# Patient Record
Sex: MALE | Race: WHITE | NOT HISPANIC OR LATINO | Employment: FULL TIME | URBAN - METROPOLITAN AREA
[De-identification: names, ages, dates, MRNs, and addresses within clinical notes are randomized per-mention and may not be internally consistent; named-entity substitution may affect disease eponyms.]

---

## 2019-10-02 ENCOUNTER — OFFICE VISIT (OUTPATIENT)
Dept: FAMILY MEDICINE | Facility: CLINIC | Age: 59
End: 2019-10-02
Attending: FAMILY MEDICINE
Payer: COMMERCIAL

## 2019-10-02 VITALS
HEART RATE: 65 BPM | SYSTOLIC BLOOD PRESSURE: 100 MMHG | HEIGHT: 70 IN | WEIGHT: 209 LBS | DIASTOLIC BLOOD PRESSURE: 70 MMHG | BODY MASS INDEX: 29.92 KG/M2 | OXYGEN SATURATION: 95 %

## 2019-10-02 DIAGNOSIS — N20.0 KIDNEY STONES: ICD-10-CM

## 2019-10-02 DIAGNOSIS — Z00.00 LABORATORY EXAM ORDERED AS PART OF ROUTINE GENERAL MEDICAL EXAMINATION: ICD-10-CM

## 2019-10-02 DIAGNOSIS — Z11.59 NEED FOR HEPATITIS C SCREENING TEST: ICD-10-CM

## 2019-10-02 DIAGNOSIS — K57.30 DIVERTICULOSIS OF LARGE INTESTINE WITHOUT HEMORRHAGE: ICD-10-CM

## 2019-10-02 DIAGNOSIS — E78.00 HYPERCHOLESTEROLEMIA: ICD-10-CM

## 2019-10-02 DIAGNOSIS — Z80.42 FAMILY HISTORY OF PROSTATE CANCER IN FATHER: ICD-10-CM

## 2019-10-02 DIAGNOSIS — Z00.00 ROUTINE GENERAL MEDICAL EXAMINATION AT A HEALTH CARE FACILITY: Primary | ICD-10-CM

## 2019-10-02 PROCEDURE — 99386 PR PREVENTIVE VISIT,NEW,40-64: ICD-10-PCS | Mod: S$GLB,,, | Performed by: FAMILY MEDICINE

## 2019-10-02 PROCEDURE — 99386 PREV VISIT NEW AGE 40-64: CPT | Mod: S$GLB,,, | Performed by: FAMILY MEDICINE

## 2019-10-02 PROCEDURE — 99999 PR PBB SHADOW E&M-NEW PATIENT-LVL III: CPT | Mod: PBBFAC,,, | Performed by: FAMILY MEDICINE

## 2019-10-02 PROCEDURE — 99999 PR PBB SHADOW E&M-NEW PATIENT-LVL III: ICD-10-PCS | Mod: PBBFAC,,, | Performed by: FAMILY MEDICINE

## 2019-10-02 NOTE — PROGRESS NOTES
Subjective:       Patient ID: Guillermo Gee is a 58 y.o. male.    Chief Complaint: Establish Care    HPI    The patient presents today for first visit with me. He is requesting a routine periodic health examination.    Patient Active Problem List   Diagnosis    Diverticulosis    Family history of prostate cancer in father    Kidney stones    Hypercholesterolemia       Past Surgical History:   Procedure Laterality Date    INGUINAL HERNIA REPAIR Left     SHOULDER ARTHROSCOPY W/ ROTATOR CUFF REPAIR Bilateral 2011, 2012    UMBILICAL HERNIA REPAIR           Current Outpatient Medications:     varicella-zoster gE-AS01B, PF, (SHINGRIX, PF,) 50 mcg/0.5 mL injection, Inject 0.5 mLs into the muscle once. for 1 dose, Disp: 0.5 mL, Rfl: 0    Review of patient's allergies indicates:   Allergen Reactions    Codeine Itching       Family History   Problem Relation Age of Onset    No Known Problems Mother     Prostate cancer Father     No Known Problems Sister     No Known Problems Brother     Colon cancer Paternal Uncle     No Known Problems Brother        Social History     Socioeconomic History    Marital status:      Spouse name: Not on file    Number of children: Not on file    Years of education: Not on file    Highest education level: Not on file   Occupational History    Occupation:      Comment: [employer]   Social Needs    Financial resource strain: Not hard at all    Food insecurity:     Worry: Never true     Inability: Never true    Transportation needs:     Medical: No     Non-medical: No   Tobacco Use    Smoking status: Current Some Day Smoker     Types: Cigarettes     Start date: 12/27/1981    Smokeless tobacco: Never Used    Tobacco comment: occ cig use: ~ 3/day   Substance and Sexual Activity    Alcohol use: Yes     Alcohol/week: 6.0 - 8.0 standard drinks     Types: 3 - 4 Glasses of wine, 3 - 4 Standard drinks or equivalent per week     Frequency: 2-3 times a week      Drinks per session: 1 or 2     Binge frequency: Less than monthly    Drug use: Never    Sexual activity: Yes     Partners: Female   Lifestyle    Physical activity:     Days per week: 0 days     Minutes per session: Not on file    Stress: Rather much   Relationships    Social connections:     Talks on phone: More than three times a week     Gets together: Once a week     Attends [employer] service: More than 4 times per year     Active member of club or organization: Yes     Attends meetings of clubs or organizations: More than 4 times per year     Relationship status:    Other Topics Concern    Not on file   Social History Narrative    He is not satisfied with weight.    Rates diet as poor.    He does not drink at least 1/2 gallon water daily.    He drinks 3-4 coffee/tea/caffeine-containing soft drinks daily.    Total sleep time at night is 6 hours.    He works 50-60 hours per week.    He does wear seat belts.    Hobbies include singing, fishing.       Patient Care Team:  Polo Osman Jr., MD as PCP - General (Family Medicine)  Francis Kaur MD as Consulting Physician (Gastroenterology)  Shine Garcia MD as Consulting Physician (Orthopedic Surgery)    Review of Systems   Constitutional: Negative for fatigue and unexpected weight change.   HENT: Negative for ear discharge, ear pain, hearing loss, tinnitus and voice change.    Eyes: Negative for pain.   Respiratory: Negative for cough and shortness of breath.    Cardiovascular: Negative for chest pain, palpitations and leg swelling.   Gastrointestinal: Negative for abdominal pain, blood in stool, constipation, diarrhea, nausea and vomiting.   Genitourinary: Negative for decreased urine volume, difficulty urinating, dysuria, enuresis, frequency, hematuria and urgency.   Musculoskeletal: Negative for arthralgias, back pain and myalgias.   Skin: Negative for rash.   Neurological: Positive for headaches (occ). Negative for dizziness, weakness and  "light-headedness.   Hematological: Does not bruise/bleed easily.   Psychiatric/Behavioral: Negative for dysphoric mood and sleep disturbance. The patient is not nervous/anxious.          Objective:      /70 (BP Location: Left arm, Patient Position: Sitting, BP Method: Large (Manual))   Pulse 65   Ht 5' 10" (1.778 m)   Wt 94.8 kg (209 lb)   SpO2 95%   BMI 29.99 kg/m²     Physical Exam   Constitutional: He is oriented to person, place, and time. He appears well-developed and well-nourished. He is cooperative.   HENT:   Head: Normocephalic and atraumatic.   Right Ear: External ear normal.   Left Ear: External ear normal.   Nose: Nose normal.   Mouth/Throat: Oropharynx is clear and moist and mucous membranes are normal. No oropharyngeal exudate.   Eyes: Conjunctivae are normal. No scleral icterus.   Neck: Neck supple. No JVD present. Carotid bruit is not present. No thyromegaly present.   Cardiovascular: Normal rate, regular rhythm, normal heart sounds and normal pulses. Exam reveals no gallop and no friction rub.   No murmur heard.  Pulmonary/Chest: Effort normal and breath sounds normal. He has no wheezes. He has no rhonchi. He has no rales.   Abdominal: Soft. Bowel sounds are normal. He exhibits no distension and no mass. There is no splenomegaly or hepatomegaly. There is no tenderness.   Musculoskeletal: Normal range of motion. He exhibits no edema or tenderness.   Lymphadenopathy:     He has no cervical adenopathy.     He has no axillary adenopathy.   Neurological: He is alert and oriented to person, place, and time. He has normal strength and normal reflexes. No cranial nerve deficit or sensory deficit. Coordination normal.   Skin: Skin is warm and dry.   Psychiatric: He has a normal mood and affect.   Vitals reviewed.        Assessment:       1. Routine general medical examination at a health care facility    2. Hypercholesterolemia    3. Diverticulosis of large intestine without hemorrhage    4. " "Family history of prostate cancer in father    5. Kidney stones    6. Need for hepatitis C screening test    7. Laboratory exam ordered as part of routine general medical examination          Plan:       Reviewed recent labs from 4/2019.  Labs (see Orders).    Discussed with patient the importance of lifestyle modifications, including well-balanced diet and moderate exercise regimen, in reducing risk for cardiovascular/cerebrovascular disease and diabetes.    Discussed routine examinations and screenings.   Declines flu vaccine and pneumococcal vaccine.   Agree to Shingrix.   Will obtain date of most recent TdaP.    Orders Placed This Encounter    Lipoprotein Analysis, by NMR    Comprehensive metabolic panel    TSH    PSA, Screening    CBC auto differential    Hepatitis C antibody    varicella-zoster gE-AS01B, PF, (SHINGRIX, PF,) 50 mcg/0.5 mL injection     We will call the patient with results & make further recommendations at that time.             "This note will not be shared with the patient."  "

## 2019-10-02 NOTE — PATIENT INSTRUCTIONS
Guillermo,     We are always striving for excellence. Should you receive a patient experience survey electronically or by mail, we would appreciate if you would take a few moments to give us your feedback. These surveys let us know our strengths as well as areas of opportunity for improvement to better serve you.    Thank you for your time,  Simi Gee LPN    Test results will be communicated to you via : [facility], Telephone or Letter.   If you have not received test results in one week, please contact the clinic at   [phone].

## 2019-10-04 ENCOUNTER — LAB VISIT (OUTPATIENT)
Dept: LAB | Facility: HOSPITAL | Age: 59
End: 2019-10-04
Attending: FAMILY MEDICINE
Payer: COMMERCIAL

## 2019-10-04 DIAGNOSIS — Z80.42 FAMILY HISTORY OF PROSTATE CANCER IN FATHER: ICD-10-CM

## 2019-10-04 DIAGNOSIS — Z11.59 NEED FOR HEPATITIS C SCREENING TEST: ICD-10-CM

## 2019-10-04 DIAGNOSIS — Z00.00 LABORATORY EXAM ORDERED AS PART OF ROUTINE GENERAL MEDICAL EXAMINATION: ICD-10-CM

## 2019-10-04 DIAGNOSIS — E78.00 HYPERCHOLESTEROLEMIA: ICD-10-CM

## 2019-10-04 LAB
ALBUMIN SERPL BCP-MCNC: 3.8 G/DL (ref 3.5–5.2)
ALP SERPL-CCNC: 49 U/L (ref 55–135)
ALT SERPL W/O P-5'-P-CCNC: 18 U/L (ref 10–44)
ANION GAP SERPL CALC-SCNC: 11 MMOL/L (ref 8–16)
AST SERPL-CCNC: 21 U/L (ref 10–40)
BASOPHILS # BLD AUTO: 0.02 K/UL (ref 0–0.2)
BASOPHILS NFR BLD: 0.3 % (ref 0–1.9)
BILIRUB SERPL-MCNC: 0.3 MG/DL (ref 0.1–1)
BUN SERPL-MCNC: 12 MG/DL (ref 6–20)
CALCIUM SERPL-MCNC: 9.1 MG/DL (ref 8.7–10.5)
CHLORIDE SERPL-SCNC: 108 MMOL/L (ref 95–110)
CO2 SERPL-SCNC: 22 MMOL/L (ref 23–29)
COMPLEXED PSA SERPL-MCNC: 0.95 NG/ML (ref 0–4)
CREAT SERPL-MCNC: 1 MG/DL (ref 0.5–1.4)
DIFFERENTIAL METHOD: NORMAL
EOSINOPHIL # BLD AUTO: 0.1 K/UL (ref 0–0.5)
EOSINOPHIL NFR BLD: 2.4 % (ref 0–8)
ERYTHROCYTE [DISTWIDTH] IN BLOOD BY AUTOMATED COUNT: 12.7 % (ref 11.5–14.5)
EST. GFR  (AFRICAN AMERICAN): >60 ML/MIN/1.73 M^2
EST. GFR  (NON AFRICAN AMERICAN): >60 ML/MIN/1.73 M^2
GLUCOSE SERPL-MCNC: 104 MG/DL (ref 70–110)
HCT VFR BLD AUTO: 46.3 % (ref 40–54)
HCV AB SERPL QL IA: NEGATIVE
HGB BLD-MCNC: 15.1 G/DL (ref 14–18)
IMM GRANULOCYTES # BLD AUTO: 0.01 K/UL (ref 0–0.04)
IMM GRANULOCYTES NFR BLD AUTO: 0.2 % (ref 0–0.5)
LYMPHOCYTES # BLD AUTO: 2.2 K/UL (ref 1–4.8)
LYMPHOCYTES NFR BLD: 38.9 % (ref 18–48)
MCH RBC QN AUTO: 29.5 PG (ref 27–31)
MCHC RBC AUTO-ENTMCNC: 32.6 G/DL (ref 32–36)
MCV RBC AUTO: 91 FL (ref 82–98)
MONOCYTES # BLD AUTO: 0.6 K/UL (ref 0.3–1)
MONOCYTES NFR BLD: 10.9 % (ref 4–15)
NEUTROPHILS # BLD AUTO: 2.7 K/UL (ref 1.8–7.7)
NEUTROPHILS NFR BLD: 47.3 % (ref 38–73)
NRBC BLD-RTO: 0 /100 WBC
PLATELET # BLD AUTO: 231 K/UL (ref 150–350)
PMV BLD AUTO: 10.6 FL (ref 9.2–12.9)
POTASSIUM SERPL-SCNC: 3.8 MMOL/L (ref 3.5–5.1)
PROT SERPL-MCNC: 6.8 G/DL (ref 6–8.4)
RBC # BLD AUTO: 5.11 M/UL (ref 4.6–6.2)
SODIUM SERPL-SCNC: 141 MMOL/L (ref 136–145)
TSH SERPL DL<=0.005 MIU/L-ACNC: 2.78 UIU/ML (ref 0.4–4)
WBC # BLD AUTO: 5.76 K/UL (ref 3.9–12.7)

## 2019-10-04 PROCEDURE — 83704 LIPOPROTEIN BLD QUAN PART: CPT

## 2019-10-04 PROCEDURE — 85025 COMPLETE CBC W/AUTO DIFF WBC: CPT

## 2019-10-04 PROCEDURE — 36415 COLL VENOUS BLD VENIPUNCTURE: CPT | Mod: PO

## 2019-10-04 PROCEDURE — 80053 COMPREHEN METABOLIC PANEL: CPT

## 2019-10-04 PROCEDURE — 84443 ASSAY THYROID STIM HORMONE: CPT

## 2019-10-04 PROCEDURE — 86803 HEPATITIS C AB TEST: CPT

## 2019-10-04 PROCEDURE — 84153 ASSAY OF PSA TOTAL: CPT

## 2019-10-07 LAB
CHOLEST SERPL-MCNC: 250 MG/DL
HDL SERPL QN: 8.5 NM
HDL SERPL-SCNC: 28.4 UMOL/L
HDLC SERPL-MCNC: 38 MG/DL (ref 40–59)
HLD.LARGE SERPL-SCNC: <2.8 UMOL/L
LDL SERPL QN: 20.4 NM
LDL SERPL-SCNC: 2345 NMOL/L
LDL SMALL SERPL-SCNC: >1085 NMOL/L
LDLC SERPL CALC-MCNC: 181 MG/DL
PATHOLOGY STUDY: ABNORMAL
TRIGL SERPL-MCNC: 153 MG/DL (ref 30–149)
VLDL LARGE SERPL-SCNC: 2.6 NMOL/L
VLDL SERPL QN: 41.2 NM

## 2019-10-08 ENCOUNTER — TELEPHONE (OUTPATIENT)
Dept: FAMILY MEDICINE | Facility: CLINIC | Age: 59
End: 2019-10-08

## 2019-10-08 DIAGNOSIS — Z91.89 FRAMINGHAM CARDIAC RISK 10-20% IN NEXT 10 YEARS: ICD-10-CM

## 2019-10-08 NOTE — TELEPHONE ENCOUNTER
Discussed lab results and Dr. Osman's recommendations with the patient. Patient states he will have to call back to schedule his follow up visit.       Patient asked that I email his lab results to his [employer] account.     Labs have been emailed.

## 2019-10-08 NOTE — TELEPHONE ENCOUNTER
----- Message from Aide Grider sent at 10/8/2019 10:35 AM CDT -----  Contact: IRWIN SPAIN [[id number]]  Name of Who is Calling: IRWIN SPAIN [[id number]]    What is the request in detail:Patient is requesting test results....Please contact to further discuss and advise      Can the clinic reply by [facility]: No   [facility]: [phone]     July 14, 2019      [address]  [address] [place] [address]        Dear ,    We are writing to inform you of your test results.    Blood sugars continue to be stable.  Celiac testing is negative.  Potassium remains slightly low and you can increase this with dietary intake.  This can be done with spinach, beans, yogurt, avocados, bananas and potatoes.  Kidney function is slightly worse than it was prior.  I would recommend increasing water intake and avoiding nonsteroidal anti-inflammatory medications including ibuprofen, Aleve, naproxen, high-dose aspirin and Advil.  Please continue with current treatment plan and work on quitting smoking.      Resulted Orders   Lipid Panel   Result Value Ref Range    Cholesterol 132 <200 mg/dL    Triglycerides 194 (H) <150 mg/dL      Comment:      Borderline high:  150-199 mg/dl  High:             200-499 mg/dl  Very high:       >499 mg/dl      HDL Cholesterol 42 23 - 92 mg/dL    LDL Cholesterol Calculated 51 <100 mg/dL      Comment:      Desirable:       <100 mg/dl    Non HDL Cholesterol 90 <130 mg/dL   Hemoglobin A1c   Result Value Ref Range    Hemoglobin A1C 7.0 (H) 4.0 - 6.0 %   TSH   Result Value Ref Range    Thyrotropin 2.69 0.34 - 5.60 IU/mL   Albumin Random Urine Quantitative with Creat Ratio   Result Value Ref Range    Creatinine Urine 161 mg/dL    Albumin Urine mg/L 28 mg/L    Albumin Urine mg/g Cr 17.45 0 - 25 mg/g Cr   Hepatitis C antibody   Result Value Ref Range    Hepatitis C Antibody Nonreactive NR^Nonreactive      Comment:      Assay performance characteristics have not been established for newborns,   infants, and children     IgA   Result Value Ref Range     70 - 380 mg/dL   Tissue transglutaminase Ab IgA and IgG   Result Value Ref Range    Tissue Transglutaminase Antibody IgA 1 <7 U/mL      Comment:      Negative  The tTG-IgA assay has limited utility for patients with decreased levels of   IgA. Screening for celiac disease should  include IgA testing to rule out   selective IgA deficiency and to guide selection and interpretation of   serological testing. tTG-IgG testing may be positive in celiac disease   patients with IgA deficiency.      Tissue Transglutaminase Pamella IgG <1 <7 U/mL      Comment:      Negative   Comprehensive Metabolic Panel   Result Value Ref Range    Sodium 140 134 - 144 mmol/L    Potassium 3.3 (L) 3.5 - 5.1 mmol/L    Chloride 103 98 - 107 mmol/L    Carbon Dioxide 28 21 - 31 mmol/L    Anion Gap 9 3 - 14 mmol/L    Glucose 101 70 - 105 mg/dL    Urea Nitrogen 21 7 - 25 mg/dL    Creatinine 1.15 0.60 - 1.20 mg/dL    GFR Estimate 47 (L) >60 mL/min/[1.73_m2]    GFR Estimate If Black 57 (L) >60 mL/min/[1.73_m2]    Calcium 9.6 8.6 - 10.3 mg/dL    Bilirubin Total 0.3 0.3 - 1.0 mg/dL    Albumin 4.0 3.5 - 5.7 g/dL    Protein Total 7.0 6.4 - 8.9 g/dL    Alkaline Phosphatase 47 34 - 104 U/L    ALT 13 7 - 52 U/L    AST 14 13 - 39 U/L       If you have any questions or concerns, please call the clinic at the number listed above.       Sincerely,        Ana Perdomo, DO

## 2019-10-08 NOTE — TELEPHONE ENCOUNTER
Please inform patient of the following:  Diabetes, prostate, hepatitis C and thyroid screenings are negative.  Liver and kidney function are normal. Blood count reveals no anemia.  Advanced lipoprotein analysis reveals very high number of LDL (bad) cholesterol particles, low HDL (good) cholesterol, and unfavorable LDL particle size.    Presently at moderate risk for heart disease, with chance of heart attack in next 10 years approximately 1 in 8.  Recommend patient return soon to discuss treatment options, including lifestyle changes and medication, as well as possible further evaluation.

## 2019-10-19 ENCOUNTER — PATIENT MESSAGE (OUTPATIENT)
Dept: FAMILY MEDICINE | Facility: CLINIC | Age: 59
End: 2019-10-19

## 2019-11-03 NOTE — PROGRESS NOTES
Subjective:       Patient ID: Guillermo Gee is a 58 y.o. male.    Chief Complaint: Hyperlipidemia (discuss test results)    HPI    Patient Active Problem List   Diagnosis    Diverticulosis    Family history of prostate cancer in father    Kidney stones    Hypercholesterolemia    [place] cardiac risk 10-20% in next 10 years     No current outpatient medications on file.    The following portions of the patient's history were reviewed and updated as appropriate: allergies, past family history, past medical history, past social history and past surgical history.    Review of Systems    Other than history of present illness, noncontributory.      Component Date Value Ref Range    Lipids, HDL Cholesterol 10/04/2019 38* 40 - 59 mg/dL    Lipids, Triglycerides 10/04/2019 153* 30 - 149 mg/dL    Lipids, Total Cholesterol 10/04/2019 250* <=199 mg/dL    Lipids, LDL Cholesterol 10/04/2019 181* <=129 mg/dL    LDL Particle Number by NMR 10/04/2019 2345* <=1135 nmol/L    HDL Particle Number 10/04/2019 28.4* >=33.0 umol/L    Small LDL Particle Number 10/04/2019 >1085* <=634 nmol/L    Large HDL Particle Number 10/04/2019 <2.8* >=4.2 umol/L    Large VLDL Particle Number 10/04/2019 2.6  <=2.7 nmol/L    VLDL Size 10/04/2019 41.2  <=46.7 nm    LDL Particle Size 10/04/2019 20.4* >=20.7 nm    HDL Size 10/04/2019 8.5* >=8.9 nm    EER LipoProfile by NMR 10/04/2019 See Note      Sodium 10/04/2019 141  136 - 145 mmol/L    Potassium 10/04/2019 3.8  3.5 - 5.1 mmol/L    Chloride 10/04/2019 108  95 - 110 mmol/L    CO2 10/04/2019 22* 23 - 29 mmol/L    Glucose 10/04/2019 104  70 - 110 mg/dL    BUN, Bld 10/04/2019 12  6 - 20 mg/dL    Creatinine 10/04/2019 1.0  0.5 - 1.4 mg/dL    Calcium 10/04/2019 9.1  8.7 - 10.5 mg/dL    Total Protein 10/04/2019 6.8  6.0 - 8.4 g/dL    Albumin 10/04/2019 3.8  3.5 - 5.2 g/dL    Total Bilirubin 10/04/2019 0.3  0.1 - 1.0 mg/dL    Alkaline Phosphatase 10/04/2019 49* 55 - 135 U/L    AST  "10/04/2019 21  10 - 40 U/L    ALT 10/04/2019 18  10 - 44 U/L    Anion Gap 10/04/2019 11  8 - 16 mmol/L    eGFR if African American 10/04/2019 >60.0  >60 mL/min/1.73 m^2    eGFR if non African American 10/04/2019 >60.0  >60 mL/min/1.73 m^2    TSH 10/04/2019 2.777  0.400 - 4.000 uIU/mL    PSA, SCREEN 10/04/2019 0.95  0.00 - 4.00 ng/mL    WBC 10/04/2019 5.76  3.90 - 12.70 K/uL    RBC 10/04/2019 5.11  4.60 - 6.20 M/uL    Hemoglobin 10/04/2019 15.1  14.0 - 18.0 g/dL    Hematocrit 10/04/2019 46.3  40.0 - 54.0 %    Mean Corpuscular Volume 10/04/2019 91  82 - 98 fL    Mean Corpuscular Hemoglobin 10/04/2019 29.5  27.0 - 31.0 pg    Mean Corpuscular Hgb Conc 10/04/2019 32.6  32.0 - 36.0 g/dL    RDW 10/04/2019 12.7  11.5 - 14.5 %    Platelets 10/04/2019 231  150 - 350 K/uL    MPV 10/04/2019 10.6  9.2 - 12.9 fL    Immature Granulocytes 10/04/2019 0.2  0.0 - 0.5 %    Gran # (ANC) 10/04/2019 2.7  1.8 - 7.7 K/uL    Immature Grans (Abs) 10/04/2019 0.01  0.00 - 0.04 K/uL    Lymph # 10/04/2019 2.2  1.0 - 4.8 K/uL    Mono # 10/04/2019 0.6  0.3 - 1.0 K/uL    Eos # 10/04/2019 0.1  0.0 - 0.5 K/uL    Baso # 10/04/2019 0.02  0.00 - 0.20 K/uL    nRBC 10/04/2019 0  0 /100 WBC    Gran% 10/04/2019 47.3  38.0 - 73.0 %    Lymph% 10/04/2019 38.9  18.0 - 48.0 %    Mono% 10/04/2019 10.9  4.0 - 15.0 %    Eosinophil% 10/04/2019 2.4  0.0 - 8.0 %    Basophil% 10/04/2019 0.3  0.0 - 1.9 %    Differential Method 10/04/2019 Automated     Hepatitis C Ab 10/04/2019 Negative  Negative       Objective:      /73 (BP Location: Left arm, Patient Position: Sitting, BP Method: Large (Automatic))   Pulse 61   Resp 16   Ht 5' 10" (1.778 m)   Wt 95.1 kg (209 lb 11.2 oz)   BMI 30.09 kg/m²     Physical Exam    The entirety of today's visit was devoted to counseling.  The visit lasted 20 minutes.      Assessment:       1. Hypercholesterolemia    2. [place] cardiac risk 10-20% in next 10 years        Plan:       Discuss " "patient's results, and his ASCVD risk.    I recommended:  1.  ASA 81mg daily.  2.  A moderate exercise regimen (such as a walking program).  3.  A low carb/high-protein diet (such as the Whole Life Challenge).    4.  A cholesterol lowering medication (such as atorvastatin).  5.  A coronary calcium score.    He agrees to all except the statin.  Repeat lipid profile in 3 months.        "This note will not be shared with the patient."  "

## 2019-11-04 ENCOUNTER — OFFICE VISIT (OUTPATIENT)
Dept: FAMILY MEDICINE | Facility: CLINIC | Age: 59
End: 2019-11-04
Attending: FAMILY MEDICINE
Payer: COMMERCIAL

## 2019-11-04 VITALS
SYSTOLIC BLOOD PRESSURE: 113 MMHG | HEART RATE: 61 BPM | BODY MASS INDEX: 30.02 KG/M2 | DIASTOLIC BLOOD PRESSURE: 73 MMHG | HEIGHT: 70 IN | WEIGHT: 209.69 LBS | RESPIRATION RATE: 16 BRPM

## 2019-11-04 DIAGNOSIS — Z91.89 AT RISK FOR CORONARY ARTERY DISEASE: ICD-10-CM

## 2019-11-04 DIAGNOSIS — E78.00 HYPERCHOLESTEROLEMIA: Primary | ICD-10-CM

## 2019-11-04 DIAGNOSIS — Z91.89 FRAMINGHAM CARDIAC RISK 10-20% IN NEXT 10 YEARS: ICD-10-CM

## 2019-11-04 PROCEDURE — 99999 PR PBB SHADOW E&M-EST. PATIENT-LVL III: CPT | Mod: PBBFAC,,, | Performed by: FAMILY MEDICINE

## 2019-11-04 PROCEDURE — 99213 PR OFFICE/OUTPT VISIT, EST, LEVL III, 20-29 MIN: ICD-10-PCS | Mod: S$GLB,,, | Performed by: FAMILY MEDICINE

## 2019-11-04 PROCEDURE — 3008F BODY MASS INDEX DOCD: CPT | Mod: CPTII,S$GLB,, | Performed by: FAMILY MEDICINE

## 2019-11-04 PROCEDURE — 3008F PR BODY MASS INDEX (BMI) DOCUMENTED: ICD-10-PCS | Mod: CPTII,S$GLB,, | Performed by: FAMILY MEDICINE

## 2019-11-04 PROCEDURE — 99999 PR PBB SHADOW E&M-EST. PATIENT-LVL III: ICD-10-PCS | Mod: PBBFAC,,, | Performed by: FAMILY MEDICINE

## 2019-11-04 PROCEDURE — 99213 OFFICE O/P EST LOW 20 MIN: CPT | Mod: S$GLB,,, | Performed by: FAMILY MEDICINE

## 2019-11-04 RX ORDER — ASPIRIN 81 MG/1
81 TABLET ORAL DAILY
Refills: 0 | Status: ON HOLD | COMMUNITY
Start: 2019-11-04 | End: 2020-01-10 | Stop reason: HOSPADM

## 2019-11-04 NOTE — PATIENT INSTRUCTIONS
Guillermo,     We are always striving for excellence. Should you receive a patient experience survey electronically or by mail, we would appreciate if you would take a few moments to give us your feedback. These surveys let us know our strengths as well as areas of opportunity for improvement to better serve you.    Thank you for your time,  Dayana Mock LPN    Your test results will be communicated to you via : [facility], Telephone or Letter.   If you have not received your test results in one week, please contact the clinic at [phone].

## 2019-11-12 ENCOUNTER — PATIENT MESSAGE (OUTPATIENT)
Dept: FAMILY MEDICINE | Facility: CLINIC | Age: 59
End: 2019-11-12

## 2019-11-14 ENCOUNTER — PATIENT MESSAGE (OUTPATIENT)
Dept: FAMILY MEDICINE | Facility: CLINIC | Age: 59
End: 2019-11-14

## 2019-11-21 ENCOUNTER — PATIENT MESSAGE (OUTPATIENT)
Dept: FAMILY MEDICINE | Facility: CLINIC | Age: 59
End: 2019-11-21

## 2019-11-21 ENCOUNTER — HOSPITAL ENCOUNTER (OUTPATIENT)
Dept: RADIOLOGY | Facility: HOSPITAL | Age: 59
Discharge: HOME | End: 2019-11-21
Attending: FAMILY MEDICINE
Payer: COMMERCIAL

## 2019-11-21 DIAGNOSIS — Z91.89 AT RISK FOR CORONARY ARTERY DISEASE: ICD-10-CM

## 2019-11-21 PROCEDURE — 75571 CT HRT W/O DYE W/CA TEST: CPT | Mod: 26,,, | Performed by: RADIOLOGY

## 2019-11-21 PROCEDURE — 75571 CT CALCIUM SCORING CARDIAC: ICD-10-PCS | Mod: 26,,, | Performed by: RADIOLOGY

## 2019-11-21 PROCEDURE — 75571 CT HRT W/O DYE W/CA TEST: CPT | Mod: TC

## 2019-12-03 ENCOUNTER — PATIENT MESSAGE (OUTPATIENT)
Dept: FAMILY MEDICINE | Facility: CLINIC | Age: 59
End: 2019-12-03

## 2019-12-03 DIAGNOSIS — I71.20 THORACIC AORTIC ANEURYSM WITHOUT RUPTURE: ICD-10-CM

## 2019-12-03 DIAGNOSIS — R89.4 SEROLOGIC ABNORMALITY: Primary | ICD-10-CM

## 2019-12-04 ENCOUNTER — PATIENT MESSAGE (OUTPATIENT)
Dept: FAMILY MEDICINE | Facility: CLINIC | Age: 59
End: 2019-12-04

## 2019-12-04 DIAGNOSIS — I71.20 THORACIC AORTIC ANEURYSM WITHOUT RUPTURE: Primary | ICD-10-CM

## 2019-12-05 ENCOUNTER — OFFICE VISIT (OUTPATIENT)
Dept: CARDIOTHORACIC SURGERY | Facility: CLINIC | Age: 59
End: 2019-12-05
Payer: COMMERCIAL

## 2019-12-05 ENCOUNTER — PATIENT OUTREACH (OUTPATIENT)
Dept: ADMINISTRATIVE | Facility: OTHER | Age: 59
End: 2019-12-05

## 2019-12-05 ENCOUNTER — HOSPITAL ENCOUNTER (OUTPATIENT)
Dept: CARDIOLOGY | Facility: CLINIC | Age: 59
Discharge: HOME | End: 2019-12-05
Attending: THORACIC SURGERY (CARDIOTHORACIC VASCULAR SURGERY)
Payer: COMMERCIAL

## 2019-12-05 VITALS
WEIGHT: 205 LBS | HEIGHT: 70 IN | BODY MASS INDEX: 29.35 KG/M2 | SYSTOLIC BLOOD PRESSURE: 124 MMHG | HEART RATE: 60 BPM | DIASTOLIC BLOOD PRESSURE: 80 MMHG

## 2019-12-05 VITALS
WEIGHT: 205.38 LBS | OXYGEN SATURATION: 98 % | SYSTOLIC BLOOD PRESSURE: 124 MMHG | HEART RATE: 71 BPM | HEIGHT: 70 IN | BODY MASS INDEX: 29.4 KG/M2 | DIASTOLIC BLOOD PRESSURE: 80 MMHG | TEMPERATURE: 98 F

## 2019-12-05 DIAGNOSIS — I71.21 ANEURYSM OF ASCENDING AORTA: Primary | ICD-10-CM

## 2019-12-05 DIAGNOSIS — I71.21 ANEURYSM OF ASCENDING AORTA: ICD-10-CM

## 2019-12-05 LAB
ASCENDING AORTA: 5.5 CM
AV INDEX (PROSTH): 0.46
AV MEAN GRADIENT: 5 MMHG
AV PEAK GRADIENT: 10 MMHG
AV VALVE AREA: 2.42 CM2
AV VELOCITY RATIO: 0.49
BSA FOR ECHO PROCEDURE: 2.14 M2
CV ECHO LV RWT: 0.31 CM
DOP CALC AO PEAK VEL: 1.61 M/S
DOP CALC AO VTI: 34.28 CM
DOP CALC LVOT AREA: 5.2 CM2
DOP CALC LVOT DIAMETER: 2.58 CM
DOP CALC LVOT PEAK VEL: 0.79 M/S
DOP CALC LVOT STROKE VOLUME: 82.87 CM3
DOP CALCLVOT PEAK VEL VTI: 15.86 CM
E WAVE DECELERATION TIME: 146.92 MSEC
E/A RATIO: 1.28
E/E' RATIO: 8.11 M/S
ECHO LV POSTERIOR WALL: 0.8 CM (ref 0.6–1.1)
FRACTIONAL SHORTENING: 29 % (ref 28–44)
INTERVENTRICULAR SEPTUM: 0.98 CM (ref 0.6–1.1)
LA MAJOR: 4.89 CM
LA MINOR: 4.88 CM
LA WIDTH: 4 CM
LEFT ATRIUM SIZE: 3.99 CM
LEFT ATRIUM VOLUME INDEX: 31.4 ML/M2
LEFT ATRIUM VOLUME: 66.27 CM3
LEFT INTERNAL DIMENSION IN SYSTOLE: 3.68 CM (ref 2.1–4)
LEFT VENTRICLE DIASTOLIC VOLUME INDEX: 61.31 ML/M2
LEFT VENTRICLE DIASTOLIC VOLUME: 129.33 ML
LEFT VENTRICLE MASS INDEX: 79 G/M2
LEFT VENTRICLE SYSTOLIC VOLUME INDEX: 27.2 ML/M2
LEFT VENTRICLE SYSTOLIC VOLUME: 57.37 ML
LEFT VENTRICULAR INTERNAL DIMENSION IN DIASTOLE: 5.2 CM (ref 3.5–6)
LEFT VENTRICULAR MASS: 166.55 G
LV LATERAL E/E' RATIO: 7.3 M/S
LV SEPTAL E/E' RATIO: 9.13 M/S
MV PEAK A VEL: 0.57 M/S
MV PEAK E VEL: 0.73 M/S
PISA TR MAX VEL: 2.23 M/S
PULM VEIN S/D RATIO: 1.42
PV PEAK D VEL: 0.43 M/S
PV PEAK S VEL: 0.61 M/S
RA MAJOR: 4.96 CM
RA PRESSURE: 3 MMHG
RA WIDTH: 3.22 CM
RIGHT VENTRICULAR END-DIASTOLIC DIMENSION: 3.33 CM
SINUS: 3.86 CM
STJ: 4.32 CM
TDI LATERAL: 0.1 M/S
TDI SEPTAL: 0.08 M/S
TDI: 0.09 M/S
TR MAX PG: 20 MMHG
TRICUSPID ANNULAR PLANE SYSTOLIC EXCURSION: 1.91 CM
TV REST PULMONARY ARTERY PRESSURE: 23 MMHG

## 2019-12-05 PROCEDURE — 99999 PR PBB SHADOW E&M-EST. PATIENT-LVL III: ICD-10-PCS | Mod: PBBFAC,,, | Performed by: THORACIC SURGERY (CARDIOTHORACIC VASCULAR SURGERY)

## 2019-12-05 PROCEDURE — 99999 PR PBB SHADOW E&M-EST. PATIENT-LVL III: CPT | Mod: PBBFAC,,, | Performed by: THORACIC SURGERY (CARDIOTHORACIC VASCULAR SURGERY)

## 2019-12-05 PROCEDURE — 93306 ECHO (CUPID ONLY): ICD-10-PCS | Mod: S$GLB,,, | Performed by: INTERNAL MEDICINE

## 2019-12-05 PROCEDURE — 99204 OFFICE O/P NEW MOD 45 MIN: CPT | Mod: S$GLB,,, | Performed by: THORACIC SURGERY (CARDIOTHORACIC VASCULAR SURGERY)

## 2019-12-05 PROCEDURE — 93306 TTE W/DOPPLER COMPLETE: CPT | Mod: S$GLB,,, | Performed by: INTERNAL MEDICINE

## 2019-12-05 PROCEDURE — 99204 PR OFFICE/OUTPT VISIT, NEW, LEVL IV, 45-59 MIN: ICD-10-PCS | Mod: S$GLB,,, | Performed by: THORACIC SURGERY (CARDIOTHORACIC VASCULAR SURGERY)

## 2019-12-05 NOTE — LETTER
December 5, 2019      Polo Osman Jr., MD  [address]  [address]  [facility] [address]           [facility] Cardi[facility] S[facility]  [address]  [facility] [address]  Phone: [phone]          Patient: Guillermo Gee   MR Number: [id number]   YOB: 1960   Date of Visit: 12/5/2019       Dear Dr. Polo Osman Jr.:    Thank you for referring Guillermo Gee to me for evaluation. Attached you will find relevant portions of my assessment and plan of care.    If you have questions, please do not hesitate to call me. I look forward to following Guillermo Gee along with you.    Sincerely,    Allen Baez MD    Enclosure  CC:  No Recipients    If you would like to receive this communication electronically, please contact [email or web address] or [phone] to request more information on [email or web address] Link access.    For providers and/or their staff who would like to refer a patient to [facility], please contact us through our one-stop-shop provider referral line, [facility], at [phone].    If you feel you have received this communication in error or would no longer like to receive these types of communications, please e-mail [email or web address]

## 2019-12-05 NOTE — PROGRESS NOTES
Subjective:      Patient ID: Guillermo eGe is a 58 y.o. male.    Chief Complaint: Consult      HPI:  Guillermo Gee is a 58 y.o. male who presents with HLD and TAA incidental finding on CT of chest for calcium scoring evaluation. Pt was referred for TAA surgical evaluation. Pt has no complaints.       Family and social history reviewed    Review of patient's allergies indicates:   Allergen Reactions    Codeine Itching     Past Medical History:   Diagnosis Date    Diverticulosis     Family history of prostate cancer in father     Kidney stones      Past Surgical History:   Procedure Laterality Date    INGUINAL HERNIA REPAIR Left     SHOULDER ARTHROSCOPY W/ ROTATOR CUFF REPAIR Bilateral 2011, 2012    UMBILICAL HERNIA REPAIR       Family History     Problem Relation (Age of Onset)    Colon cancer Paternal Uncle    No Known Problems Mother, Sister, Brother, Brother    Prostate cancer Father        Social History     Socioeconomic History    Marital status:      Spouse name: Not on file    Number of children: Not on file    Years of education: Not on file    Highest education level: Not on file   Occupational History    Occupation:      Comment: [employer]   Social [employer]    Financial resource strain: Not hard at all    Food insecurity:     Worry: Never true     Inability: Never true    Transportation needs:     Medical: No     Non-medical: No   Tobacco Use    Smoking status: Current Some Day Smoker     Types: Cigarettes     Start date: 12/27/1981    Smokeless tobacco: Never Used    Tobacco comment: occ cig use: ~ 3/day   Substance and Sexual Activity    Alcohol use: Yes     Alcohol/week: 6.0 - 8.0 standard drinks     Types: 3 - 4 Glasses of wine, 3 - 4 Standard drinks or equivalent per week     Frequency: 2-3 times a week     Drinks per session: 1 or 2     Binge frequency: Less than monthly    Drug use: Never    Sexual activity: Yes     Partners: Female   Lifestyle    Physical  activity:     Days per week: 0 days     Minutes per session: Not on file    Stress: Rather much   Relationships    Social connections:     Talks on phone: More than three times a week     Gets together: Once a week     Attends [employer] service: More than 4 times per year     Active member of club or organization: Yes     Attends meetings of clubs or organizations: More than 4 times per year     Relationship status:    Other Topics Concern    Not on file   Social History Narrative    He is not satisfied with weight.    Rates diet as poor.    He does not drink at least 1/2 gallon water daily.    He drinks 3-4 coffee/tea/caffeine-containing soft drinks daily.    Total sleep time at night is 6 hours.    He works 50-60 hours per week.    He does wear seat belts.    Hobbies include singing, fishing.       Current medications Reviewed    Review of Systems   Constitutional: Negative for activity change and fatigue.   Respiratory: Negative for shortness of breath.    Cardiovascular: Negative for chest pain, palpitations and leg swelling.   Gastrointestinal: Negative for abdominal pain, diarrhea and vomiting.   Endocrine: Negative for polydipsia, polyphagia and polyuria.   Genitourinary: Negative for dysuria.   Musculoskeletal: Negative for gait problem.   Skin: Negative for rash.   Allergic/Immunologic: Negative for immunocompromised state.   Neurological: Negative for dizziness, syncope and weakness.   Hematological: Does not bruise/bleed easily.   Psychiatric/Behavioral: Negative for behavioral problems.     Objective:   Physical Exam   Constitutional: He is oriented to person, place, and time. He appears well-developed and well-nourished.   HENT:   Head: Normocephalic and atraumatic.   Eyes: EOM are normal.   Neck: Normal range of motion.   Cardiovascular: Normal rate, regular rhythm and normal heart sounds.   Pulmonary/Chest: Effort normal and breath sounds normal.   Abdominal: Soft.   Musculoskeletal: Normal  range of motion.   Neurological: He is alert and oriented to person, place, and time.   Skin: Skin is warm, dry and intact. Capillary refill takes less than 2 seconds.   Psychiatric: He has a normal mood and affect.       Diagnostic Results:   CT chest:   There is fusiform dilatation of the ascending aorta that we measure at 5.4 cm    All diagnotics and labs reviewed.  Assessment:   1. TAA  Plan:   Pt will need ECHO and C to eval for further intervention     CTS Attending Note:    I have personally taken the history and examined this patient and agree with the KELLEY's note as stated above. Very pleasant 58-year-old gentleman who as part of a routine physical had a CT calcium score done.  This study demonstrated an ascending aortic aneurysm that is roughly 5.4-5.5 cm in diameter.  Based on the size of his aneurysm, I recommended surgical correction.  We discussed the rationale for recommending surgery over observation.  We discussed aortic complications, including rupture and dissection. We discussed the surgical approach, i.e. median sternotomy.  We discussed the need for preoperative echocardiography as well as coronary angiography.  He agreed that he would like to have this addressed.  We will make arrangements for him to be seen for a diagnostic angiogram.  He is going to get an echo today.  We did not schedule a date, but I suspect that we will address this for him shortly after the 1st of the year.

## 2019-12-06 ENCOUNTER — PATIENT MESSAGE (OUTPATIENT)
Dept: FAMILY MEDICINE | Facility: CLINIC | Age: 59
End: 2019-12-06

## 2019-12-06 ENCOUNTER — LAB VISIT (OUTPATIENT)
Dept: LAB | Facility: HOSPITAL | Age: 59
End: 2019-12-06
Attending: FAMILY MEDICINE
Payer: COMMERCIAL

## 2019-12-06 ENCOUNTER — PATIENT MESSAGE (OUTPATIENT)
Dept: CARDIOTHORACIC SURGERY | Facility: CLINIC | Age: 59
End: 2019-12-06

## 2019-12-06 DIAGNOSIS — R89.4 SEROLOGIC ABNORMALITY: ICD-10-CM

## 2019-12-06 PROCEDURE — 36415 COLL VENOUS BLD VENIPUNCTURE: CPT | Mod: PO

## 2019-12-06 PROCEDURE — 86592 SYPHILIS TEST NON-TREP QUAL: CPT

## 2019-12-07 LAB — RPR SER QL: NORMAL

## 2019-12-08 ENCOUNTER — PATIENT MESSAGE (OUTPATIENT)
Dept: FAMILY MEDICINE | Facility: CLINIC | Age: 59
End: 2019-12-08

## 2019-12-09 ENCOUNTER — PATIENT MESSAGE (OUTPATIENT)
Dept: FAMILY MEDICINE | Facility: CLINIC | Age: 59
End: 2019-12-09

## 2019-12-10 ENCOUNTER — TELEPHONE (OUTPATIENT)
Dept: PREADMISSION TESTING | Facility: HOSPITAL | Age: 59
End: 2019-12-10

## 2019-12-10 DIAGNOSIS — I71.21 ANEURYSM OF ASCENDING AORTA: Primary | ICD-10-CM

## 2019-12-10 NOTE — TELEPHONE ENCOUNTER
1/6 Appt. Enter per your request. Did not contact patient. This patient has no surgery tab this could lead to cancel appt.

## 2019-12-10 NOTE — TELEPHONE ENCOUNTER
----- Message from Josh Burnham RN sent at 12/10/2019  1:26 PM CST -----  Anesthesia Pre op Appt Request - [facility]     Ascending Aorta   1/6/2020  Mon    Requested Date:   01/02/2020      AM  or PM:  After 10 am    I will work around your appt to fill in the time with other pre-op appts.    Thanks........ Marin   [id number]

## 2019-12-11 ENCOUNTER — TELEPHONE (OUTPATIENT)
Dept: CARDIOTHORACIC SURGERY | Facility: CLINIC | Age: 59
End: 2019-12-11

## 2019-12-11 ENCOUNTER — PATIENT OUTREACH (OUTPATIENT)
Dept: ADMINISTRATIVE | Facility: OTHER | Age: 59
End: 2019-12-11

## 2019-12-11 ENCOUNTER — PATIENT MESSAGE (OUTPATIENT)
Dept: FAMILY MEDICINE | Facility: CLINIC | Age: 59
End: 2019-12-11

## 2019-12-11 ENCOUNTER — PATIENT MESSAGE (OUTPATIENT)
Dept: CARDIOTHORACIC SURGERY | Facility: CLINIC | Age: 59
End: 2019-12-11

## 2019-12-11 DIAGNOSIS — I71.20 THORACIC AORTIC ANEURYSM WITHOUT RUPTURE: Primary | ICD-10-CM

## 2019-12-11 NOTE — TELEPHONE ENCOUNTER
Spoke with pt in regards his request to reschedule his angiogram. Informed pt I sent message over to Dr. Day's staff , Mr Tito Gee stated he spoke with someone in Dr. Kyle's office already and will keep his 12/19 angiogram.

## 2019-12-12 ENCOUNTER — INITIAL CONSULT (OUTPATIENT)
Dept: CARDIOLOGY | Facility: CLINIC | Age: 59
End: 2019-12-12
Payer: COMMERCIAL

## 2019-12-12 ENCOUNTER — DOCUMENTATION ONLY (OUTPATIENT)
Dept: CARDIOLOGY | Facility: CLINIC | Age: 59
End: 2019-12-12

## 2019-12-12 ENCOUNTER — TELEPHONE (OUTPATIENT)
Dept: FAMILY MEDICINE | Facility: CLINIC | Age: 59
End: 2019-12-12

## 2019-12-12 ENCOUNTER — PATIENT MESSAGE (OUTPATIENT)
Dept: CARDIOTHORACIC SURGERY | Facility: CLINIC | Age: 59
End: 2019-12-12

## 2019-12-12 ENCOUNTER — LAB VISIT (OUTPATIENT)
Dept: LAB | Facility: HOSPITAL | Age: 59
End: 2019-12-12
Attending: INTERNAL MEDICINE
Payer: COMMERCIAL

## 2019-12-12 VITALS
DIASTOLIC BLOOD PRESSURE: 80 MMHG | OXYGEN SATURATION: 98 % | HEIGHT: 70 IN | HEART RATE: 71 BPM | BODY MASS INDEX: 29.8 KG/M2 | WEIGHT: 208.13 LBS | SYSTOLIC BLOOD PRESSURE: 110 MMHG

## 2019-12-12 DIAGNOSIS — I71.21 ANEURYSM OF ASCENDING AORTA: Primary | ICD-10-CM

## 2019-12-12 DIAGNOSIS — I71.21 ANEURYSM OF ASCENDING AORTA: ICD-10-CM

## 2019-12-12 DIAGNOSIS — E78.00 HYPERCHOLESTEROLEMIA: ICD-10-CM

## 2019-12-12 DIAGNOSIS — Q23.1 BICUSPID AORTIC VALVE: ICD-10-CM

## 2019-12-12 LAB
ANION GAP SERPL CALC-SCNC: 7 MMOL/L (ref 8–16)
BASOPHILS # BLD AUTO: 0.01 K/UL (ref 0–0.2)
BASOPHILS NFR BLD: 0.1 % (ref 0–1.9)
BUN SERPL-MCNC: 11 MG/DL (ref 6–20)
CALCIUM SERPL-MCNC: 9.5 MG/DL (ref 8.7–10.5)
CHLORIDE SERPL-SCNC: 103 MMOL/L (ref 95–110)
CO2 SERPL-SCNC: 30 MMOL/L (ref 23–29)
CREAT SERPL-MCNC: 1.1 MG/DL (ref 0.5–1.4)
DIFFERENTIAL METHOD: NORMAL
EOSINOPHIL # BLD AUTO: 0.1 K/UL (ref 0–0.5)
EOSINOPHIL NFR BLD: 1.3 % (ref 0–8)
ERYTHROCYTE [DISTWIDTH] IN BLOOD BY AUTOMATED COUNT: 13.1 % (ref 11.5–14.5)
EST. GFR  (AFRICAN AMERICAN): >60 ML/MIN/1.73 M^2
EST. GFR  (NON AFRICAN AMERICAN): >60 ML/MIN/1.73 M^2
GLUCOSE SERPL-MCNC: 104 MG/DL (ref 70–110)
HCT VFR BLD AUTO: 46.8 % (ref 40–54)
HGB BLD-MCNC: 15.2 G/DL (ref 14–18)
IMM GRANULOCYTES # BLD AUTO: 0.02 K/UL (ref 0–0.04)
IMM GRANULOCYTES NFR BLD AUTO: 0.3 % (ref 0–0.5)
LYMPHOCYTES # BLD AUTO: 2 K/UL (ref 1–4.8)
LYMPHOCYTES NFR BLD: 25.8 % (ref 18–48)
MCH RBC QN AUTO: 29.5 PG (ref 27–31)
MCHC RBC AUTO-ENTMCNC: 32.5 G/DL (ref 32–36)
MCV RBC AUTO: 91 FL (ref 82–98)
MONOCYTES # BLD AUTO: 0.9 K/UL (ref 0.3–1)
MONOCYTES NFR BLD: 11.4 % (ref 4–15)
NEUTROPHILS # BLD AUTO: 4.9 K/UL (ref 1.8–7.7)
NEUTROPHILS NFR BLD: 61.1 % (ref 38–73)
NRBC BLD-RTO: 0 /100 WBC
PLATELET # BLD AUTO: 217 K/UL (ref 150–350)
PMV BLD AUTO: 10 FL (ref 9.2–12.9)
POTASSIUM SERPL-SCNC: 4.6 MMOL/L (ref 3.5–5.1)
RBC # BLD AUTO: 5.15 M/UL (ref 4.6–6.2)
SODIUM SERPL-SCNC: 140 MMOL/L (ref 136–145)
WBC # BLD AUTO: 7.92 K/UL (ref 3.9–12.7)

## 2019-12-12 PROCEDURE — 99203 PR OFFICE/OUTPT VISIT, NEW, LEVL III, 30-44 MIN: ICD-10-PCS | Mod: S$GLB,,, | Performed by: INTERNAL MEDICINE

## 2019-12-12 PROCEDURE — 36415 COLL VENOUS BLD VENIPUNCTURE: CPT

## 2019-12-12 PROCEDURE — 80048 BASIC METABOLIC PNL TOTAL CA: CPT

## 2019-12-12 PROCEDURE — 3008F PR BODY MASS INDEX (BMI) DOCUMENTED: ICD-10-PCS | Mod: CPTII,S$GLB,, | Performed by: INTERNAL MEDICINE

## 2019-12-12 PROCEDURE — 99203 OFFICE O/P NEW LOW 30 MIN: CPT | Mod: S$GLB,,, | Performed by: INTERNAL MEDICINE

## 2019-12-12 PROCEDURE — 99999 PR PBB SHADOW E&M-EST. PATIENT-LVL III: CPT | Mod: PBBFAC,,, | Performed by: INTERNAL MEDICINE

## 2019-12-12 PROCEDURE — 85025 COMPLETE CBC W/AUTO DIFF WBC: CPT

## 2019-12-12 PROCEDURE — 3008F BODY MASS INDEX DOCD: CPT | Mod: CPTII,S$GLB,, | Performed by: INTERNAL MEDICINE

## 2019-12-12 PROCEDURE — 99999 PR PBB SHADOW E&M-EST. PATIENT-LVL III: ICD-10-PCS | Mod: PBBFAC,,, | Performed by: INTERNAL MEDICINE

## 2019-12-12 RX ORDER — AMOXICILLIN 500 MG
2 CAPSULE ORAL 2 TIMES DAILY
COMMUNITY

## 2019-12-12 RX ORDER — MULTIVIT WITH MINERALS/HERBS
1 TABLET ORAL DAILY
COMMUNITY

## 2019-12-12 NOTE — TELEPHONE ENCOUNTER
----- Message from Radha Jacinto sent at 12/12/2019 10:04 AM CST -----  Contact: Pt  Please call pt and explain to why he has to have a consult and labs today with Dr. Kyle. Pt is having stints placed next week.

## 2019-12-12 NOTE — LETTER
December 15, 2019      Allen Baez MD  [address]  [facility] [address]           [facility]  [address]  [facility] [address]  Phone: [phone]          Patient: Guillermo Gee   MR Number: [id number]   YOB: 1960   Date of Visit: 12/12/2019       Dear Dr. Allen Baez:    Thank you for referring Guillermo Gee to me for evaluation. Attached you will find relevant portions of my assessment and plan of care.    If you have questions, please do not hesitate to call me. I look forward to following Guillermo Gee along with you.    Sincerely,    John Kyle MD    Enclosure  CC:  No Recipients    If you would like to receive this communication electronically, please contact [email or web address] or [phone] to request more information on Open[employer] Link access.    For providers and/or their staff who would like to refer a patient to [facility], please contact us through our one-stop-shop provider referral line, [facility], at [phone].    If you feel you have received this communication in error or would no longer like to receive these types of communications, please e-mail [email or web address]

## 2019-12-12 NOTE — PROGRESS NOTES
OUTPATIENT CATHETERIZATION INSTRUCTIONS    You have been scheduled for a procedure in the catheterization lab on Thursday, December 19, 2019.     Please report to the Cardiology Waiting Area on the Third floor of the hospital and check in at 11 AM.   You will then be taken to the SSCU (Short Stay Cardiac Unit) and prepared for your procedure. Please be aware that this is not the time of your procedure but the time you are to arrive. The procedures are scheduled on an hourly basis; however, emergency cases take precedence over all other cases.       You may not have anything to eat or drink after midnight the night before your test. You may take your regular morning medications with water. If there are any medications that you should not take you will be instructed to hold them that morning. If you are diabetic and on Metformin (Glucophage) do not take it the day before, the day of, and for 2 days after your procedure.      The procedure will take 1-2 hours to perform. After the procedure, you will return to SSCU on the third floor of the hospital. You will need to lie still (or keep your arm still) for the next 4 to 6 hours to minimize bleeding from the puncture site. Your family may remain in the room with you during this time.       You may be able to be discharged home that same afternoon if there is someone to drive you home and there were no complications. If you have one of the balloon, stent, or device procedures you may spend the night in the hospital. Your doctor will determine, based on your progress, the date and time of your discharge. The results of your procedure will be discussed with you before you are discharged. Any further testing or procedures will be scheduled for you either before you leave or you will be called with these appointments.       If you should have any questions, concerns, or need to change the date of your procedure, please call  HARSH Abdi @ [phone]    Special  Instructions:  Drink plenty of water the day before and after your procedure.     THE ABOVE INSTRUCTIONS WERE GIVEN TO THE PATIENT VERBALLY AND THEY VERBALIZED UNDERSTANDING.  THEY DO NOT REQUIRE ANY SPECIAL NEEDS AND DO NOT HAVE ANY LEARNING BARRIERS.          Directions for Reporting to Cardiology Waiting Area in the Hospital  If you park in the Parking Garage:  Take elevators to the1st floor of the parking garage.  Continue past the gift shop, coffee shop, and piano.  Take a right and go to the gold elevators. (Elevator B)  Take the elevator to the 3rd floor.  Follow the arrow on the sign on the wall that says Cath Lab Registration/EP Lab Registration.  Follow the long hallway all the way around until you come to a big open area.  This is the registration area.  Check in at Reception Desk.    OR    If family is dropping you off:  Have them drop you off at the front of the Hospital under the green overhang.  Enter through the doors and take a right.  Take the E elevators to the 3rd floor Cardiology Waiting Area.  Check in at the Reception Desk in the waiting room.

## 2019-12-14 NOTE — PROGRESS NOTES
Cardiology Clinic Note  Reason for Visit: [facility]   Referring MD: Dr. Baez     HPI:     Guillermo Gee is a 58 y.o. M with TAA, HLD who presents for [facility] prior to TAA repair. TAA was discovered incidentally as part of prevention screening for CAD with Cardiac CT, which noted a ascending aortic aneurysm of 5.4 cm. Further TTE also noted a bileaflet aortic valve.     He reports his brothers have been diagnosed with bicuspid aortic valve incidentally when murmur was heard aspart of sports physical exam, but the patient reports that he has not had a murmur in the past. He walks up to 3 miles a day and denies any chest pain or shortness of breath with exertion. He also denies palpitations, orthopnea, leg swelling or PND symptoms.     ROS:    Constitution: Negative for fever or chills. Negative for weight loss or gain.   HENT: Negative for sore throat or headaches. Negative for rhinorrhea.  Eyes: Negative for blurred or double vision.   Cardiovascular: See above  Pulmonary: Negative for SOB. Negative for cough.   Gastrointestinal: Negative for abdominal pain. Negative for nausea/ vomiting. Negative for diarrhea.   : Negative for dysuria.   Neurological: Negative for focal weakness or sensory changes.  PMH:     Past Medical History:   Diagnosis Date    Diverticulosis     Family history of prostate cancer in father     Kidney stones      Past Surgical History:   Procedure Laterality Date    INGUINAL HERNIA REPAIR Left     SHOULDER ARTHROSCOPY W/ ROTATOR CUFF REPAIR Bilateral 2011, 2012    UMBILICAL HERNIA REPAIR       Allergies:     Review of patient's allergies indicates:   Allergen Reactions    Codeine Itching     Medications:     Current Outpatient Medications on File Prior to Visit   Medication Sig Dispense Refill    aspirin (ECOTRIN) 81 MG EC tablet Take 1 tablet (81 mg total) by mouth once daily.  0    b complex vitamins tablet Take 1 tablet by mouth once daily.      BIOTIN ORAL Take 1 tablet by mouth once  "daily.      omega-3 fatty acids/fish oil (FISH OIL-OMEGA-3 FATTY ACIDS) 300-1,000 mg capsule Take 2 capsules by mouth once daily.      biotin/calcium carbonate (BIOTIN 100+10 ORAL) Take by mouth.       No current facility-administered medications on file prior to visit.      Social History:     Social History     Tobacco Use    Smoking status: Current Some Day Smoker     Types: Cigarettes     Start date: 12/27/1981    Smokeless tobacco: Never Used    Tobacco comment: occ cig use: ~ 3/day   Substance Use Topics    Alcohol use: Yes     Alcohol/week: 6.0 - 8.0 standard drinks     Types: 3 - 4 Glasses of wine, 3 - 4 Standard drinks or equivalent per week     Frequency: 2-3 times a week     Drinks per session: 1 or 2     Binge frequency: Less than monthly     Family History:     Family History   Problem Relation Age of Onset    No Known Problems Mother     Prostate cancer Father     No Known Problems Sister     No Known Problems Brother     Colon cancer Paternal Uncle     No Known Problems Brother      Physical Exam:     Vitals:    12/12/19 1505 12/12/19 1506   BP: 112/81 110/80   BP Location: Left arm Right arm   Patient Position: Sitting Sitting   BP Method: Large (Automatic) Large (Automatic)   Pulse: 69 71   SpO2:  98%   Weight:  94.4 kg (208 lb 1.8 oz)   Height:  5' 10" (1.778 m)         Constitutional: No acute distress, conversant  HEENT: Sclera anicteric, extraocular motions intact, Oropharynx clear  Neck: No JVD, no carotid bruits  Cardiovascular: regular rate and rhythm, no murmur, rubs or gallops, normal S1/S2 (loud P2)   Pulmonary: Clear to auscultation bilaterally  Abdominal: Abdomen soft, nontender, nondistended, positive bowel sounds  Extremities: No lower extremity edema,   Pulses: 2+ BL Radial, 2+ BL carotid, 2+ BL DP, 2+ BL PT  Skin: No ecchymosis, erythema, or ulcers  Psych: Alert and oriented x 3, appropriate affect  Neuro: CNII-XII intact, no focal deficits    Labs:     Lab Results "   Component Value Date     12/12/2019    K 4.6 12/12/2019     12/12/2019    CO2 30 (H) 12/12/2019    BUN 11 12/12/2019    CREATININE 1.1 12/12/2019    ANIONGAP 7 (L) 12/12/2019     Lab Results   Component Value Date    AST 21 10/04/2019    ALT 18 10/04/2019    ALKPHOS 49 (L) 10/04/2019    BILITOT 0.3 10/04/2019    ALBUMIN 3.8 10/04/2019     Lab Results   Component Value Date    CALCIUM 9.5 12/12/2019     No results found for: BNP, BNPTRIAGEBLO Lab Results   Component Value Date    WBC 7.92 12/12/2019    HGB 15.2 12/12/2019    HCT 46.8 12/12/2019     12/12/2019    GRAN 4.9 12/12/2019    GRAN 61.1 12/12/2019     No results found for: PTT, INR  No results found for: CHOL, HDL, LDLCALC, TRIG  No results found for: HGBA1C  Lab Results   Component Value Date    TSH 2.777 10/04/2019          Imaging:   TTE 12/5/19:   · Normal left ventricular systolic function. The estimated ejection fraction is 55%  · Normal LV diastolic function.  · No wall motion abnormalities.  · Normal right ventricular systolic function.  · The aortic valve is bileaflet ( see text)  · Mild tricuspid regurgitation.  · The estimated PA systolic pressure is 23 mm Hg  · Normal central venous pressure (3 mm Hg).  · The sinuses of Valsalva is mildly dilated.  · The aortic root is mildly dilated.  · The ascending aorta is severely dilated ( aneurysmal 5.5cm)       Assessment:     1. Aneurysm of ascending aorta     2. Hypercholesterolemia     3. Bicuspid aortic valve       Plan:     Guillermo Gee is a 58 y.o. M with TAA, HLD who was referred here by Dr. Baez for C prior to TAA repair.    1. Aneurysm of ascending aorta   - scheduled for surgical repair by Dr. Baez in Jan 2019  - [facility] pre-op for evaluation of CAD  -   Anesthesia/Surgery risks, benefits and alternative options discussed and understood by patient/family.   - TTE 12/5/19: EF 55%, bileaflet aortic valve, no AS or AI  - LHC +/- PCI, patient is a ROMANA candidate  -  Anti-platelet Therapy: plavix 75mg / ASA 81mg   - Access: Right radial   - Catheters: 6Fr sheath, Yuan   - Creatinine/CrCl: 1.0 on 10/4/19  - Allergies: Codeine  - Pre-Hydration: 0.9 NS 3 cc/kg/hr x 1 hour  - Pre-Op Med: 50mg Benadryl   - All patient's questions were answered.  -The risks, benefits and alternatives of the procedure were explained to the patient's family and surrogate decision maker.   -The risks of coronary angiography include but are not limited to: bleeding, infection, heart rhythm abnormalities, allergic reactions, kidney injury and potential need for dialysis, stroke and death.   - Should stenting be indicated, the patient has agreed to dual anti-platelet therapy for 1-consecutive year with a drug-eluting stent and a minimum of 1-month with the use of a bare metal stent  - Additionally, pt is aware that non-compliance is likely to result in stent clotting with heart attack, heart failure, and/or death  -The risks of moderate sedation include hypotension, respiratory depression, arrhythmias, bronchospasm, and death.   - Informed consent was obtained and the  patient is agreeable to proceed with the procedure.    2. Bicuspid aortic valve  - incidental finding on TTE. No murmur. No symptoms or limitations with exertion at this time.     3. Hyperlipidemia  - per last lipids, ASCVD 8.4%. Coronary calcium score: 247 suggestive of moderate atherosclerosis. Pt will benefit from statin therapy and  has encouraged this in the past, but patient reports he has made lifestyle change to diet and exercise and would lik to try that first.   - if CAD present, will start statin.     Patient seen and plan of care discussed with Dr. Kyle    Signed:  Lelia Amos MD  Cardiology Fellow PGY 4  Beeper:  [phone]    I have personally taken the history and examined this patient and agree with the resident's note as stated above.  All of the patient's questions were answered.

## 2019-12-14 NOTE — H&P (VIEW-ONLY)
Cardiology Clinic Note  Reason for Visit: [facility]   Referring MD: Dr. Baez     HPI:     Guillermo Gee is a 58 y.o. M with TAA, HLD who presents for [facility] prior to TAA repair. TAA was discovered incidentally as part of prevention screening for CAD with Cardiac CT, which noted a ascending aortic aneurysm of 5.4 cm. Further TTE also noted a bileaflet aortic valve.     He reports his brothers have been diagnosed with bicuspid aortic valve incidentally when murmur was heard aspart of sports physical exam, but the patient reports that he has not had a murmur in the past. He walks up to 3 miles a day and denies any chest pain or shortness of breath with exertion. He also denies palpitations, orthopnea, leg swelling or PND symptoms.     ROS:    Constitution: Negative for fever or chills. Negative for weight loss or gain.   HENT: Negative for sore throat or headaches. Negative for rhinorrhea.  Eyes: Negative for blurred or double vision.   Cardiovascular: See above  Pulmonary: Negative for SOB. Negative for cough.   Gastrointestinal: Negative for abdominal pain. Negative for nausea/ vomiting. Negative for diarrhea.   : Negative for dysuria.   Neurological: Negative for focal weakness or sensory changes.  PMH:     Past Medical History:   Diagnosis Date    Diverticulosis     Family history of prostate cancer in father     Kidney stones      Past Surgical History:   Procedure Laterality Date    INGUINAL HERNIA REPAIR Left     SHOULDER ARTHROSCOPY W/ ROTATOR CUFF REPAIR Bilateral 2011, 2012    UMBILICAL HERNIA REPAIR       Allergies:     Review of patient's allergies indicates:   Allergen Reactions    Codeine Itching     Medications:     Current Outpatient Medications on File Prior to Visit   Medication Sig Dispense Refill    aspirin (ECOTRIN) 81 MG EC tablet Take 1 tablet (81 mg total) by mouth once daily.  0    b complex vitamins tablet Take 1 tablet by mouth once daily.      BIOTIN ORAL Take 1 tablet by mouth once  "daily.      omega-3 fatty acids/fish oil (FISH OIL-OMEGA-3 FATTY ACIDS) 300-1,000 mg capsule Take 2 capsules by mouth once daily.      biotin/calcium carbonate (BIOTIN 100+10 ORAL) Take by mouth.       No current facility-administered medications on file prior to visit.      Social History:     Social History     Tobacco Use    Smoking status: Current Some Day Smoker     Types: Cigarettes     Start date: 12/27/1981    Smokeless tobacco: Never Used    Tobacco comment: occ cig use: ~ 3/day   Substance Use Topics    Alcohol use: Yes     Alcohol/week: 6.0 - 8.0 standard drinks     Types: 3 - 4 Glasses of wine, 3 - 4 Standard drinks or equivalent per week     Frequency: 2-3 times a week     Drinks per session: 1 or 2     Binge frequency: Less than monthly     Family History:     Family History   Problem Relation Age of Onset    No Known Problems Mother     Prostate cancer Father     No Known Problems Sister     No Known Problems Brother     Colon cancer Paternal Uncle     No Known Problems Brother      Physical Exam:     Vitals:    12/12/19 1505 12/12/19 1506   BP: 112/81 110/80   BP Location: Left arm Right arm   Patient Position: Sitting Sitting   BP Method: Large (Automatic) Large (Automatic)   Pulse: 69 71   SpO2:  98%   Weight:  94.4 kg (208 lb 1.8 oz)   Height:  5' 10" (1.778 m)         Constitutional: No acute distress, conversant  HEENT: Sclera anicteric, extraocular motions intact, Oropharynx clear  Neck: No JVD, no carotid bruits  Cardiovascular: regular rate and rhythm, no murmur, rubs or gallops, normal S1/S2 (loud P2)   Pulmonary: Clear to auscultation bilaterally  Abdominal: Abdomen soft, nontender, nondistended, positive bowel sounds  Extremities: No lower extremity edema,   Pulses: 2+ BL Radial, 2+ BL carotid, 2+ BL DP, 2+ BL PT  Skin: No ecchymosis, erythema, or ulcers  Psych: Alert and oriented x 3, appropriate affect  Neuro: CNII-XII intact, no focal deficits    Labs:     Lab Results "   Component Value Date     12/12/2019    K 4.6 12/12/2019     12/12/2019    CO2 30 (H) 12/12/2019    BUN 11 12/12/2019    CREATININE 1.1 12/12/2019    ANIONGAP 7 (L) 12/12/2019     Lab Results   Component Value Date    AST 21 10/04/2019    ALT 18 10/04/2019    ALKPHOS 49 (L) 10/04/2019    BILITOT 0.3 10/04/2019    ALBUMIN 3.8 10/04/2019     Lab Results   Component Value Date    CALCIUM 9.5 12/12/2019     No results found for: BNP, BNPTRIAGEBLO Lab Results   Component Value Date    WBC 7.92 12/12/2019    HGB 15.2 12/12/2019    HCT 46.8 12/12/2019     12/12/2019    GRAN 4.9 12/12/2019    GRAN 61.1 12/12/2019     No results found for: PTT, INR  No results found for: CHOL, HDL, LDLCALC, TRIG  No results found for: HGBA1C  Lab Results   Component Value Date    TSH 2.777 10/04/2019          Imaging:   TTE 12/5/19:   · Normal left ventricular systolic function. The estimated ejection fraction is 55%  · Normal LV diastolic function.  · No wall motion abnormalities.  · Normal right ventricular systolic function.  · The aortic valve is bileaflet ( see text)  · Mild tricuspid regurgitation.  · The estimated PA systolic pressure is 23 mm Hg  · Normal central venous pressure (3 mm Hg).  · The sinuses of Valsalva is mildly dilated.  · The aortic root is mildly dilated.  · The ascending aorta is severely dilated ( aneurysmal 5.5cm)       Assessment:     1. Aneurysm of ascending aorta     2. Hypercholesterolemia     3. Bicuspid aortic valve       Plan:     Guillermo Gee is a 58 y.o. M with TAA, HLD who was referred here by Dr. Baez for C prior to TAA repair.    1. Aneurysm of ascending aorta   - scheduled for surgical repair by Dr. Baez in Jan 2019  - [facility] pre-op for evaluation of CAD  -   Anesthesia/Surgery risks, benefits and alternative options discussed and understood by patient/family.   - TTE 12/5/19: EF 55%, bileaflet aortic valve, no AS or AI  - LHC +/- PCI, patient is a ROMANA candidate  -  Anti-platelet Therapy: plavix 75mg / ASA 81mg   - Access: Right radial   - Catheters: 6Fr sheath, Yuan   - Creatinine/CrCl: 1.0 on 10/4/19  - Allergies: Codeine  - Pre-Hydration: 0.9 NS 3 cc/kg/hr x 1 hour  - Pre-Op Med: 50mg Benadryl   - All patient's questions were answered.  -The risks, benefits and alternatives of the procedure were explained to the patient's family and surrogate decision maker.   -The risks of coronary angiography include but are not limited to: bleeding, infection, heart rhythm abnormalities, allergic reactions, kidney injury and potential need for dialysis, stroke and death.   - Should stenting be indicated, the patient has agreed to dual anti-platelet therapy for 1-consecutive year with a drug-eluting stent and a minimum of 1-month with the use of a bare metal stent  - Additionally, pt is aware that non-compliance is likely to result in stent clotting with heart attack, heart failure, and/or death  -The risks of moderate sedation include hypotension, respiratory depression, arrhythmias, bronchospasm, and death.   - Informed consent was obtained and the  patient is agreeable to proceed with the procedure.    2. Bicuspid aortic valve  - incidental finding on TTE. No murmur. No symptoms or limitations with exertion at this time.     3. Hyperlipidemia  - per last lipids, ASCVD 8.4%. Coronary calcium score: 247 suggestive of moderate atherosclerosis. Pt will benefit from statin therapy and  has encouraged this in the past, but patient reports he has made lifestyle change to diet and exercise and would lik to try that first.   - if CAD present, will start statin.     Patient seen and plan of care discussed with Dr. Kyle    Signed:  Lelia Amos MD  Cardiology Fellow PGY 4  Beeper:  [phone]    I have personally taken the history and examined this patient and agree with the resident's note as stated above.  All of the patient's questions were answered.

## 2019-12-17 ENCOUNTER — PATIENT MESSAGE (OUTPATIENT)
Dept: CARDIOTHORACIC SURGERY | Facility: CLINIC | Age: 59
End: 2019-12-17

## 2019-12-18 ENCOUNTER — PATIENT MESSAGE (OUTPATIENT)
Dept: MEDSURG UNIT | Facility: HOSPITAL | Age: 59
End: 2019-12-18

## 2019-12-18 ENCOUNTER — PATIENT MESSAGE (OUTPATIENT)
Dept: CARDIOTHORACIC SURGERY | Facility: CLINIC | Age: 59
End: 2019-12-18

## 2019-12-18 ENCOUNTER — PATIENT MESSAGE (OUTPATIENT)
Dept: FAMILY MEDICINE | Facility: CLINIC | Age: 59
End: 2019-12-18

## 2019-12-19 ENCOUNTER — HOSPITAL ENCOUNTER (OUTPATIENT)
Facility: HOSPITAL | Age: 59
Discharge: HOME | End: 2019-12-19
Attending: INTERNAL MEDICINE | Admitting: INTERNAL MEDICINE
Payer: COMMERCIAL

## 2019-12-19 VITALS
HEART RATE: 67 BPM | WEIGHT: 198 LBS | RESPIRATION RATE: 18 BRPM | SYSTOLIC BLOOD PRESSURE: 121 MMHG | HEIGHT: 70 IN | DIASTOLIC BLOOD PRESSURE: 81 MMHG | BODY MASS INDEX: 28.35 KG/M2 | TEMPERATURE: 97 F | OXYGEN SATURATION: 98 %

## 2019-12-19 DIAGNOSIS — Q23.1 BICUSPID AORTIC VALVE: ICD-10-CM

## 2019-12-19 DIAGNOSIS — I71.20 THORACIC AORTIC ANEURYSM WITHOUT RUPTURE: Primary | ICD-10-CM

## 2019-12-19 DIAGNOSIS — E78.00 HYPERCHOLESTEROLEMIA: ICD-10-CM

## 2019-12-19 DIAGNOSIS — I25.10 CORONARY ARTERY DISEASE INVOLVING NATIVE CORONARY ARTERY OF NATIVE HEART WITHOUT ANGINA PECTORIS: ICD-10-CM

## 2019-12-19 PROBLEM — Q23.81 BICUSPID AORTIC VALVE: Status: ACTIVE | Noted: 2019-12-19

## 2019-12-19 LAB
ABO + RH BLD: NORMAL
ALBUMIN SERPL BCP-MCNC: 4.1 G/DL (ref 3.5–5.2)
ALP SERPL-CCNC: 56 U/L (ref 55–135)
ALT SERPL W/O P-5'-P-CCNC: 15 U/L (ref 10–44)
ANION GAP SERPL CALC-SCNC: 7 MMOL/L (ref 8–16)
AST SERPL-CCNC: 21 U/L (ref 10–40)
BASOPHILS # BLD AUTO: 0.03 K/UL (ref 0–0.2)
BASOPHILS NFR BLD: 0.6 % (ref 0–1.9)
BILIRUB SERPL-MCNC: 0.5 MG/DL (ref 0.1–1)
BLD GP AB SCN CELLS X3 SERPL QL: NORMAL
BUN SERPL-MCNC: 13 MG/DL (ref 6–20)
CALCIUM SERPL-MCNC: 9.1 MG/DL (ref 8.7–10.5)
CHLORIDE SERPL-SCNC: 104 MMOL/L (ref 95–110)
CO2 SERPL-SCNC: 27 MMOL/L (ref 23–29)
CREAT SERPL-MCNC: 1 MG/DL (ref 0.5–1.4)
DIFFERENTIAL METHOD: NORMAL
EOSINOPHIL # BLD AUTO: 0.1 K/UL (ref 0–0.5)
EOSINOPHIL NFR BLD: 1.8 % (ref 0–8)
ERYTHROCYTE [DISTWIDTH] IN BLOOD BY AUTOMATED COUNT: 12.7 % (ref 11.5–14.5)
EST. GFR  (AFRICAN AMERICAN): >60 ML/MIN/1.73 M^2
EST. GFR  (NON AFRICAN AMERICAN): >60 ML/MIN/1.73 M^2
GLUCOSE SERPL-MCNC: 98 MG/DL (ref 70–110)
HCT VFR BLD AUTO: 44.9 % (ref 40–54)
HGB BLD-MCNC: 15.5 G/DL (ref 14–18)
IMM GRANULOCYTES # BLD AUTO: 0.01 K/UL (ref 0–0.04)
IMM GRANULOCYTES NFR BLD AUTO: 0.2 % (ref 0–0.5)
LYMPHOCYTES # BLD AUTO: 1.8 K/UL (ref 1–4.8)
LYMPHOCYTES NFR BLD: 35.5 % (ref 18–48)
MCH RBC QN AUTO: 30.3 PG (ref 27–31)
MCHC RBC AUTO-ENTMCNC: 34.5 G/DL (ref 32–36)
MCV RBC AUTO: 88 FL (ref 82–98)
MONOCYTES # BLD AUTO: 0.6 K/UL (ref 0.3–1)
MONOCYTES NFR BLD: 11.6 % (ref 4–15)
NEUTROPHILS # BLD AUTO: 2.5 K/UL (ref 1.8–7.7)
NEUTROPHILS NFR BLD: 50.3 % (ref 38–73)
NRBC BLD-RTO: 0 /100 WBC
PLATELET # BLD AUTO: 240 K/UL (ref 150–350)
PMV BLD AUTO: 9.9 FL (ref 9.2–12.9)
POTASSIUM SERPL-SCNC: 4 MMOL/L (ref 3.5–5.1)
PROT SERPL-MCNC: 7.4 G/DL (ref 6–8.4)
RBC # BLD AUTO: 5.11 M/UL (ref 4.6–6.2)
SODIUM SERPL-SCNC: 138 MMOL/L (ref 136–145)
WBC # BLD AUTO: 5.01 K/UL (ref 3.9–12.7)

## 2019-12-19 PROCEDURE — 85025 COMPLETE CBC W/AUTO DIFF WBC: CPT

## 2019-12-19 PROCEDURE — 99024 POSTOP FOLLOW-UP VISIT: CPT | Mod: ,,, | Performed by: PHYSICIAN ASSISTANT

## 2019-12-19 PROCEDURE — 86901 BLOOD TYPING SEROLOGIC RH(D): CPT

## 2019-12-19 PROCEDURE — 63600175 PHARM REV CODE 636 W HCPCS: Performed by: INTERNAL MEDICINE

## 2019-12-19 PROCEDURE — 93454 PR CATH PLACE/CORONARY ANGIO, IMG SUPER/INTERP: ICD-10-PCS | Mod: 26,,, | Performed by: INTERNAL MEDICINE

## 2019-12-19 PROCEDURE — 25000003 PHARM REV CODE 250: Performed by: INTERNAL MEDICINE

## 2019-12-19 PROCEDURE — 93454 CORONARY ARTERY ANGIO S&I: CPT | Performed by: INTERNAL MEDICINE

## 2019-12-19 PROCEDURE — 80053 COMPREHEN METABOLIC PANEL: CPT

## 2019-12-19 PROCEDURE — 93454 CORONARY ARTERY ANGIO S&I: CPT | Mod: 26,,, | Performed by: INTERNAL MEDICINE

## 2019-12-19 PROCEDURE — 25000003 PHARM REV CODE 250: Performed by: STUDENT IN AN ORGANIZED HEALTH CARE EDUCATION/TRAINING PROGRAM

## 2019-12-19 PROCEDURE — C1887 CATHETER, GUIDING: HCPCS | Performed by: INTERNAL MEDICINE

## 2019-12-19 PROCEDURE — 99153 MOD SED SAME PHYS/QHP EA: CPT | Performed by: INTERNAL MEDICINE

## 2019-12-19 PROCEDURE — 25500020 PHARM REV CODE 255: Performed by: INTERNAL MEDICINE

## 2019-12-19 PROCEDURE — C1769 GUIDE WIRE: HCPCS | Performed by: INTERNAL MEDICINE

## 2019-12-19 PROCEDURE — 99024 PR POST-OP FOLLOW-UP VISIT: ICD-10-PCS | Mod: ,,, | Performed by: PHYSICIAN ASSISTANT

## 2019-12-19 PROCEDURE — 99152 MOD SED SAME PHYS/QHP 5/>YRS: CPT | Performed by: INTERNAL MEDICINE

## 2019-12-19 PROCEDURE — 63600175 PHARM REV CODE 636 W HCPCS: Performed by: STUDENT IN AN ORGANIZED HEALTH CARE EDUCATION/TRAINING PROGRAM

## 2019-12-19 PROCEDURE — C1894 INTRO/SHEATH, NON-LASER: HCPCS | Performed by: INTERNAL MEDICINE

## 2019-12-19 RX ORDER — VERAPAMIL HYDROCHLORIDE 2.5 MG/ML
INJECTION, SOLUTION INTRAVENOUS
Status: DISCONTINUED | OUTPATIENT
Start: 2019-12-19 | End: 2019-12-19 | Stop reason: HOSPADM

## 2019-12-19 RX ORDER — HEPARIN SODIUM 1000 [USP'U]/ML
INJECTION, SOLUTION INTRAVENOUS; SUBCUTANEOUS
Status: DISCONTINUED | OUTPATIENT
Start: 2019-12-19 | End: 2019-12-19 | Stop reason: HOSPADM

## 2019-12-19 RX ORDER — NITROGLYCERIN 5 MG/ML
INJECTION, SOLUTION INTRAVENOUS
Status: DISCONTINUED | OUTPATIENT
Start: 2019-12-19 | End: 2019-12-19 | Stop reason: HOSPADM

## 2019-12-19 RX ORDER — SODIUM CHLORIDE 9 MG/ML
INJECTION, SOLUTION INTRAVENOUS CONTINUOUS
Status: ACTIVE | OUTPATIENT
Start: 2019-12-19 | End: 2019-12-19

## 2019-12-19 RX ORDER — FENTANYL CITRATE 50 UG/ML
INJECTION, SOLUTION INTRAMUSCULAR; INTRAVENOUS
Status: DISCONTINUED | OUTPATIENT
Start: 2019-12-19 | End: 2019-12-19 | Stop reason: HOSPADM

## 2019-12-19 RX ORDER — MIDAZOLAM HYDROCHLORIDE 1 MG/ML
INJECTION, SOLUTION INTRAMUSCULAR; INTRAVENOUS
Status: DISCONTINUED | OUTPATIENT
Start: 2019-12-19 | End: 2019-12-19 | Stop reason: HOSPADM

## 2019-12-19 RX ORDER — DIPHENHYDRAMINE HCL 50 MG
50 CAPSULE ORAL ONCE
Status: COMPLETED | OUTPATIENT
Start: 2019-12-19 | End: 2019-12-19

## 2019-12-19 RX ORDER — SODIUM CHLORIDE 9 MG/ML
20 INJECTION, SOLUTION INTRAVENOUS CONTINUOUS
Status: DISCONTINUED | OUTPATIENT
Start: 2019-12-19 | End: 2019-12-19 | Stop reason: HOSPADM

## 2019-12-19 RX ADMIN — DIPHENHYDRAMINE HYDROCHLORIDE 50 MG: 50 CAPSULE ORAL at 11:12

## 2019-12-19 RX ADMIN — SODIUM CHLORIDE: 0.9 INJECTION, SOLUTION INTRAVENOUS at 11:12

## 2019-12-19 NOTE — INTERVAL H&P NOTE
The patient has been examined and the H&P has been reviewed:     I concur with the findings and no changes have occurred since H&P was written. Mr. Ortiz is a 49 y/o M with TAA, HLD who presents for LHC prior to TAA repair (5.4 cm). He has since also noted to have a bicuspid aortic valve on TTE. He was seen in Interventional cardiology clinic for LHC and risks, benefits and alternatives discussed with patient and consents signed. He denies any new symptoms since then. His last meal was ~ 2000 yesterday and he reports taking all his meds yesterday.     - TTE 12/5/19: EF 55%   - LHC  - Anti-platelet Therapy: plavix 75mg / ASA 81mg   - Access: Right radial   - Catheters: 6Fr sheath, Yuan   - Creatinine/CrCl: 1.1 on 12/12/19. Cr today: pending  - Allergies: codeine (itching)  - Pre-Hydration: 0.9 NS 3 cc/kg/hr  - Pre-Op Med: 50mg Benadryl   - All patient's questions were answered.  -The risks, benefits and alternatives of the procedure were explained to the patient's family and surrogate decision maker.   -The risks of coronary angiography include but are not limited to: bleeding, infection, heart rhythm abnormalities, allergic reactions, kidney injury and potential need for dialysis, stroke and death.   -The risks of moderate sedation include hypotension, respiratory depression, arrhythmias, bronchospasm, and death.   - Informed consent was obtained and the  patient is agreeable to proceed with the procedure.  - Anesthesia/Surgery risks, benefits and alternative options discussed and understood by patient/family.          Active Hospital Problems    Diagnosis  POA    Thoracic aortic aneurysm without rupture [I71.2]  Yes      Resolved Hospital Problems   No resolved problems to display.

## 2019-12-19 NOTE — PROGRESS NOTES
R radial site monitored for 1 hour. No bleeding or hematoma noted. Guaze and transparent film applied to R wrist. Instructions discussed with patient and daughter. Will monitor.

## 2019-12-19 NOTE — DISCHARGE SUMMARY
Discharge Summary  Interventional Cardiology      Admit Date: 12/19/2019    Discharge Date:  12/19/2019    Attending Physician: John Kyle MD    Discharge Physician: Marcela Loera PA-C    Principal Diagnoses: Thoracic aortic aneurysm without rupture    Hospital Problems:   Active Hospital Problems    Diagnosis  POA    *Thoracic aortic aneurysm without rupture [I71.2]  Yes    Bicuspid aortic valve [Q23.1]  Not Applicable    CAD (coronary artery disease) [I25.10]  Yes    Hypercholesterolemia [E78.00]  Yes      Resolved Hospital Problems   No resolved problems to display.        Indication for Admission: CATHETERIZATION, HEART, LEFT (Left)    Discharged Condition: Good    Hospital Course:   Patient presented for outpatient coronary angiogram which went without complication. Coronary angiogram was nonobstructive. He had 40% proximal RCA stenosis. See full cath report in Epic for details. Hemostasis of patient's R Radial access site was achieved with VascBand. Patient was monitored per post-cath protocol, and his R radial access site was c/d/i with no hematoma. Patient was able to ambulate without difficulty. He was feeling well and anticipating discharge home today. He was given appropriate home care instructions and told to follow up with Dr. Baez as scheduled.    Outpatient Plan:  1. Nonobstructive coronary artery disease  - Continue Aspirin 81 mg daily   - Statin therapy was strongly recommended, but patient prefers to try lifestyle changes including diet and exercise prior to starting a statin.   - Follow heart healthy diet and maintain regular aerobic exercise  - Follow up with Dr. Baez as scheduled.     2. Ascending aortic aneurysm  - scheduled for surgical repair by Dr. Baez in Jan 2019    3. Bicuspid aortic valve  - incidental finding on TTE. No murmur. No symptoms or limitations with exertion at this time    4. Hyperlipidemia  - LDL goal is < 70.   - Patient refuses statin therapy at  this time and wants to try lifestyle changes.  - Follow up with Dr. Osman for further cholesterol management.     Diet: Cardiac diet    Activity: Ad shane, wound care instructions provided    Disposition: Home or Self Care    Discharge Medications:      Medication List      CONTINUE taking these medications    aspirin 81 MG EC tablet  Commonly known as:  ECOTRIN  Take 1 tablet (81 mg total) by mouth once daily.     b complex vitamins tablet     BIOTIN 100+10 ORAL     BIOTIN ORAL     fish oil-omega-3 fatty acids 300-1,000 mg capsule          Follow Up:  Follow-up Information     Allen Baez MD.    Specialties:  Cardiothoracic Surgery, Transplant  Why:  for scheduled pre-op testing   Contact information:  [address]  [facility] [id number] [phone]

## 2019-12-19 NOTE — PROGRESS NOTES
Vasc band removed per protocol. Patient tolerated well. Gauze/tegaderm dressing placed. Will monitor.

## 2019-12-19 NOTE — Clinical Note
Angiography performed of the right coronary arteries in multiple views. Angiography performed via hand injection with .

## 2019-12-19 NOTE — PLAN OF CARE
Pt arrived to unit without distress or discomfort.  Vss. Oriented pt to rm and unit.  Pre op orders and assessment initiated. Pt remains npo.  Tp in no acute distress and verbalizes no complaints.  Will continue to monitor.  Call bell within reach.

## 2019-12-19 NOTE — PLAN OF CARE
Report received from HARSH Carcamo. Patient s/p [facility]. R radial vasc band intact. No bleeding or hematoma noted. IVF infusing. Call light in reach. Will monitor.

## 2019-12-20 NOTE — DISCHARGE INSTRUCTIONS
You underwent cardiac catheterization today which revealed mild coronary artery disease. It is recommended that you take Aspirin 81 mg daily and start statin therapy. Recommend continuing lifestyle modification (aerobic exercise and heart healthy diet) and then rechecking cholesterol at next cardiology or primary care appointment. Please follow up with Dr. Baez as scheduled. Read and follow the instructions below:    Discharge Instructions and Care of Your Wrist After a Cardiac Catheterization Procedure Performed via the Radial Artery    Wrist Care:    The dressing (band-aid) on the puncture site may be removed after 24 hours and left open to air.  If there is minor oozing, you may apply another Band-aid and remove after 12 hours. Keep the site clean and dry.   You may shower on the day following your procedure.  Do not take a tub bath or submerge the puncture site in water for 5 days following the procedure    If bleeding should occur following discharge:   Sit down and apply firm pressure to the puncture site with your fingers for 10 minutes    If the bleeding stops, continue to sit quietly, keeping your wrist straight for 2 hours. Notify your physician as soon as possible    If bleeding does not stop after 10 minutes or if there is a large amount of bleeding or spurting, call 911 immediately.  Do not drive yourself to the hospital.     Wrist site healing:   The healing wrist site should be soft and dry. A bruise may be present. You should expect mild tingling in your hand and tenderness at the puncture site for up to 3 days. Please contact your doctor or the Cardiac Cath Lab doctor if any of the following signs appear:  · Redness, heat, or pus at the puncture site.  · Change in color or temperature of the hand or arm.  · A lump at the puncture site that enlarges or is larger than marble size.  · Chills or fever greater than 101.0 F    Activities:  While the wrist is healing, bleeding or swelling can occur  as a result of stress or strain to the wrist. Carefully follow these guidelines:  · On the day of discharge, limit your activities. Do not subject your affected hand/arm to any forceful movements (i.e. supporting weight when rising from a chair or bed).  · Do not drive for 24 hours.  · For the next week:  · Do not lift more than 5 lbs. for the next week.   Do not operate a lawnmower, motorcycle, chainsaw, or all-terrain vehicle    Avoid excessive (extension/flexion) wrist movement (i.e. supporting weight when rising from a chair or bed, push-ups, lifting garage doors, etc.)   Do not engage in vigorous exercise (i.e. Tennis, Golf, Bowling) using the affected arm    Medications:  · Take all other medicines as directed by your doctor. Do not take any extra aspirin or anti-inflammatory medicines such as Motrin, Aleve or others. They can increase your risk of bleeding. Many over-the-counter drugs contain aspirin. If you are unsure about what the drug contains, check with your pharmacist or doctor before taking it. Tylenol may be used for minor pain or fever. Do not exceed 4,000 mg of acetaminophen (Tylenol) in 24 hours.     Go to the nearest Emergency Room if you have:  · Chest discomfort or pain  · Excessive bruising, blood in urine/stool or black tarry stools.

## 2019-12-26 ENCOUNTER — PATIENT MESSAGE (OUTPATIENT)
Dept: CARDIOTHORACIC SURGERY | Facility: CLINIC | Age: 59
End: 2019-12-26

## 2020-01-02 ENCOUNTER — HOSPITAL ENCOUNTER (OUTPATIENT)
Dept: CARDIOLOGY | Facility: CLINIC | Age: 60
Discharge: HOME | End: 2020-01-02
Payer: COMMERCIAL

## 2020-01-02 ENCOUNTER — HOSPITAL ENCOUNTER (OUTPATIENT)
Dept: PULMONOLOGY | Facility: CLINIC | Age: 60
Discharge: HOME | End: 2020-01-02
Payer: COMMERCIAL

## 2020-01-02 ENCOUNTER — HOSPITAL ENCOUNTER (OUTPATIENT)
Dept: PREADMISSION TESTING | Facility: HOSPITAL | Age: 60
Discharge: HOME | End: 2020-01-02
Attending: ANESTHESIOLOGY
Payer: COMMERCIAL

## 2020-01-02 ENCOUNTER — HOSPITAL ENCOUNTER (OUTPATIENT)
Dept: RADIOLOGY | Facility: HOSPITAL | Age: 60
Discharge: HOME | End: 2020-01-02
Attending: THORACIC SURGERY (CARDIOTHORACIC VASCULAR SURGERY)
Payer: COMMERCIAL

## 2020-01-02 ENCOUNTER — OFFICE VISIT (OUTPATIENT)
Dept: CARDIOTHORACIC SURGERY | Facility: CLINIC | Age: 60
End: 2020-01-02
Payer: COMMERCIAL

## 2020-01-02 ENCOUNTER — DOCUMENTATION ONLY (OUTPATIENT)
Dept: CARDIOTHORACIC SURGERY | Facility: CLINIC | Age: 60
End: 2020-01-02

## 2020-01-02 ENCOUNTER — HOSPITAL ENCOUNTER (OUTPATIENT)
Dept: VASCULAR SURGERY | Facility: CLINIC | Age: 60
Discharge: HOME | End: 2020-01-02
Attending: THORACIC SURGERY (CARDIOTHORACIC VASCULAR SURGERY)
Payer: COMMERCIAL

## 2020-01-02 VITALS
SYSTOLIC BLOOD PRESSURE: 116 MMHG | HEIGHT: 70 IN | DIASTOLIC BLOOD PRESSURE: 84 MMHG | OXYGEN SATURATION: 98 % | BODY MASS INDEX: 28.35 KG/M2 | WEIGHT: 198 LBS | HEART RATE: 70 BPM

## 2020-01-02 VITALS
SYSTOLIC BLOOD PRESSURE: 123 MMHG | RESPIRATION RATE: 18 BRPM | TEMPERATURE: 99 F | HEART RATE: 61 BPM | OXYGEN SATURATION: 96 % | WEIGHT: 198 LBS | DIASTOLIC BLOOD PRESSURE: 87 MMHG | HEIGHT: 70 IN | BODY MASS INDEX: 28.35 KG/M2

## 2020-01-02 DIAGNOSIS — I71.20 THORACIC AORTIC ANEURYSM WITHOUT RUPTURE: ICD-10-CM

## 2020-01-02 DIAGNOSIS — I71.21 ANEURYSM OF ASCENDING AORTA: Primary | ICD-10-CM

## 2020-01-02 DIAGNOSIS — I71.21 ASCENDING AORTIC ANEURYSM: ICD-10-CM

## 2020-01-02 DIAGNOSIS — Z01.818 PRE-OP EXAM: ICD-10-CM

## 2020-01-02 LAB
DLCO ADJ PRE: 30.64 ML/(MIN*MMHG) (ref 21.69–35.55)
DLCO SINGLE BREATH LLN: 21.69
DLCO SINGLE BREATH PRE REF: 109.7 %
DLCO SINGLE BREATH REF: 28.62
DLCOC SBVA LLN: 2.92
DLCOC SBVA PRE REF: 125.1 %
DLCOC SBVA REF: 4.15
DLCOC SINGLE BREATH LLN: 21.69
DLCOC SINGLE BREATH PRE REF: 107.1 %
DLCOC SINGLE BREATH REF: 28.62
DLCOCSBVAULN: 5.37
DLCOCSINGLEBREATHULN: 35.55
DLCOSINGLEBREATHULN: 35.55
DLCOVA LLN: 2.92
DLCOVA PRE REF: 128.1 %
DLCOVA PRE: 5.31 ML/(MIN*MMHG*L) (ref 2.92–5.37)
DLCOVA REF: 4.15
DLCOVAULN: 5.37
DLVAADJ PRE: 5.19 ML/(MIN*MMHG*L) (ref 2.92–5.37)
FEF 25 75 LLN: 1.52
FEF 25 75 PRE REF: 124.4 %
FEF 25 75 REF: 3.03
FEV05 LLN: 1.61
FEV05 REF: 2.74
FEV1 FVC LLN: 66
FEV1 FVC PRE REF: 105 %
FEV1 FVC REF: 78
FEV1 LLN: 2.69
FEV1 PRE REF: 96.2 %
FEV1 REF: 3.53
FVC LLN: 3.48
FVC PRE REF: 91.4 %
FVC REF: 4.54
IVC PRE: 4.17 L (ref 3.48–5.6)
IVC SINGLE BREATH LLN: 3.48
IVC SINGLE BREATH PRE REF: 91.8 %
IVC SINGLE BREATH REF: 4.54
IVCSINGLEBREATHULN: 5.6
MVV LLN: 114
MVV PRE REF: 120.8 %
MVV REF: 134
PEF LLN: 6.87
PEF PRE REF: 139.9 %
PEF REF: 9.12
PHYSICIAN COMMENT: ABNORMAL
PRE DLCO: 31.39 ML/(MIN*MMHG) (ref 21.69–35.55)
PRE FEF 25 75: 3.77 L/S (ref 1.52–4.54)
PRE FET 100: 7.69 SEC
PRE FEV05 REF: 105.9 %
PRE FEV1 FVC: 81.86 % (ref 66.22–89.78)
PRE FEV1: 3.4 L (ref 2.69–4.38)
PRE FEV5: 2.9 L (ref 1.61–3.88)
PRE FVC: 4.15 L (ref 3.48–5.6)
PRE MVV: 162 L/MIN (ref 114–154.24)
PRE PEF: 12.76 L/S (ref 6.87–11.36)
VA PRE: 5.91 L (ref 6.75–6.75)
VA SINGLE BREATH LLN: 6.75
VA SINGLE BREATH PRE REF: 87.5 %
VA SINGLE BREATH REF: 6.75
VASINGLEBREATHULN: 6.75

## 2020-01-02 PROCEDURE — 71046 X-RAY EXAM CHEST 2 VIEWS: CPT | Mod: TC,FY

## 2020-01-02 PROCEDURE — 71046 XR CHEST PA AND LATERAL: ICD-10-PCS | Mod: 26,,, | Performed by: RADIOLOGY

## 2020-01-02 PROCEDURE — 93005 EKG 12-LEAD: ICD-10-PCS | Mod: S$GLB,,, | Performed by: THORACIC SURGERY (CARDIOTHORACIC VASCULAR SURGERY)

## 2020-01-02 PROCEDURE — 93005 ELECTROCARDIOGRAM TRACING: CPT | Mod: S$GLB,,, | Performed by: THORACIC SURGERY (CARDIOTHORACIC VASCULAR SURGERY)

## 2020-01-02 PROCEDURE — 93010 ELECTROCARDIOGRAM REPORT: CPT | Mod: S$GLB,,, | Performed by: INTERNAL MEDICINE

## 2020-01-02 PROCEDURE — 99999 PR PBB SHADOW E&M-EST. PATIENT-LVL III: CPT | Mod: PBBFAC,,, | Performed by: THORACIC SURGERY (CARDIOTHORACIC VASCULAR SURGERY)

## 2020-01-02 PROCEDURE — 93880 EXTRACRANIAL BILAT STUDY: CPT | Mod: S$GLB,,, | Performed by: SURGERY

## 2020-01-02 PROCEDURE — 71046 X-RAY EXAM CHEST 2 VIEWS: CPT | Mod: 26,,, | Performed by: RADIOLOGY

## 2020-01-02 PROCEDURE — 99499 UNLISTED E&M SERVICE: CPT | Mod: S$GLB,,, | Performed by: THORACIC SURGERY (CARDIOTHORACIC VASCULAR SURGERY)

## 2020-01-02 PROCEDURE — 93010 EKG 12-LEAD: ICD-10-PCS | Mod: S$GLB,,, | Performed by: INTERNAL MEDICINE

## 2020-01-02 PROCEDURE — 94729 DIFFUSING CAPACITY: CPT | Mod: S$GLB,,, | Performed by: INTERNAL MEDICINE

## 2020-01-02 PROCEDURE — 93880 PR DUPLEX SCAN EXTRACRANIAL,BILAT: ICD-10-PCS | Mod: S$GLB,,, | Performed by: SURGERY

## 2020-01-02 PROCEDURE — 99499 NO LOS: ICD-10-PCS | Mod: S$GLB,,, | Performed by: THORACIC SURGERY (CARDIOTHORACIC VASCULAR SURGERY)

## 2020-01-02 PROCEDURE — 94010 BREATHING CAPACITY TEST: ICD-10-PCS | Mod: S$GLB,,, | Performed by: INTERNAL MEDICINE

## 2020-01-02 PROCEDURE — 94729 PR C02/MEMBANE DIFFUSE CAPACITY: ICD-10-PCS | Mod: S$GLB,,, | Performed by: INTERNAL MEDICINE

## 2020-01-02 PROCEDURE — 94010 BREATHING CAPACITY TEST: CPT | Mod: S$GLB,,, | Performed by: INTERNAL MEDICINE

## 2020-01-02 PROCEDURE — 99999 PR PBB SHADOW E&M-EST. PATIENT-LVL III: ICD-10-PCS | Mod: PBBFAC,,, | Performed by: THORACIC SURGERY (CARDIOTHORACIC VASCULAR SURGERY)

## 2020-01-02 RX ORDER — PANTOPRAZOLE SODIUM 40 MG/1
40 TABLET, DELAYED RELEASE ORAL
Status: CANCELLED | OUTPATIENT
Start: 2020-01-03

## 2020-01-02 RX ORDER — ASPIRIN 300 MG/1
300 SUPPOSITORY RECTAL ONCE AS NEEDED
Status: CANCELLED | OUTPATIENT
Start: 2020-01-02 | End: 2031-05-31

## 2020-01-02 RX ORDER — SODIUM CHLORIDE 0.9 % (FLUSH) 0.9 %
10 SYRINGE (ML) INJECTION
Status: CANCELLED | OUTPATIENT
Start: 2020-01-02

## 2020-01-02 RX ORDER — ASPIRIN 325 MG
325 TABLET ORAL ONCE
Status: CANCELLED | OUTPATIENT
Start: 2020-01-02 | End: 2020-01-02

## 2020-01-02 RX ORDER — SODIUM CHLORIDE 9 MG/ML
INJECTION, SOLUTION INTRAVENOUS CONTINUOUS
Status: CANCELLED | OUTPATIENT
Start: 2020-01-02

## 2020-01-02 RX ORDER — BISACODYL 10 MG
10 SUPPOSITORY, RECTAL RECTAL EVERY 12 HOURS PRN
Status: CANCELLED | OUTPATIENT
Start: 2020-01-02

## 2020-01-02 RX ORDER — MUPIROCIN 20 MG/G
1 OINTMENT TOPICAL 2 TIMES DAILY
Status: CANCELLED | OUTPATIENT
Start: 2020-01-02 | End: 2020-01-07

## 2020-01-02 RX ORDER — DEXTROSE MONOHYDRATE, SODIUM CHLORIDE, AND POTASSIUM CHLORIDE 50; 1.49; 4.5 G/1000ML; G/1000ML; G/1000ML
INJECTION, SOLUTION INTRAVENOUS CONTINUOUS
Status: CANCELLED | OUTPATIENT
Start: 2020-01-02

## 2020-01-02 RX ORDER — POTASSIUM CHLORIDE 750 MG/1
20 TABLET, EXTENDED RELEASE ORAL EVERY 12 HOURS
Status: CANCELLED | OUTPATIENT
Start: 2020-01-02

## 2020-01-02 RX ORDER — METOPROLOL TARTRATE 25 MG/1
25 TABLET ORAL
Status: CANCELLED | OUTPATIENT
Start: 2020-01-02

## 2020-01-02 RX ORDER — ASPIRIN 325 MG
325 TABLET ORAL DAILY
Status: CANCELLED | OUTPATIENT
Start: 2020-01-02

## 2020-01-02 RX ORDER — FENTANYL CITRATE 50 UG/ML
50 INJECTION, SOLUTION INTRAMUSCULAR; INTRAVENOUS
Status: CANCELLED | OUTPATIENT
Start: 2020-01-07

## 2020-01-02 RX ORDER — IPRATROPIUM BROMIDE AND ALBUTEROL SULFATE 2.5; .5 MG/3ML; MG/3ML
3 SOLUTION RESPIRATORY (INHALATION) EVERY 4 HOURS PRN
Status: CANCELLED | OUTPATIENT
Start: 2020-01-02 | End: 2020-01-03

## 2020-01-02 RX ORDER — FENTANYL CITRATE 50 UG/ML
25 INJECTION, SOLUTION INTRAMUSCULAR; INTRAVENOUS
Status: CANCELLED | OUTPATIENT
Start: 2020-01-02

## 2020-01-02 RX ORDER — OXYCODONE HYDROCHLORIDE 5 MG/1
5 TABLET ORAL EVERY 4 HOURS PRN
Status: CANCELLED | OUTPATIENT
Start: 2020-01-02

## 2020-01-02 RX ORDER — POTASSIUM CHLORIDE 29.8 MG/ML
40 INJECTION INTRAVENOUS
Status: CANCELLED | OUTPATIENT
Start: 2020-01-02

## 2020-01-02 RX ORDER — ATORVASTATIN CALCIUM 10 MG/1
40 TABLET, FILM COATED ORAL NIGHTLY
Status: CANCELLED | OUTPATIENT
Start: 2020-01-02

## 2020-01-02 RX ORDER — ASPIRIN 325 MG
325 TABLET, DELAYED RELEASE (ENTERIC COATED) ORAL DAILY
Status: CANCELLED | OUTPATIENT
Start: 2020-01-02

## 2020-01-02 RX ORDER — ONDANSETRON 2 MG/ML
4 INJECTION INTRAMUSCULAR; INTRAVENOUS EVERY 12 HOURS PRN
Status: CANCELLED | OUTPATIENT
Start: 2020-01-02

## 2020-01-02 RX ORDER — POTASSIUM CHLORIDE 14.9 MG/ML
60 INJECTION INTRAVENOUS
Status: CANCELLED | OUTPATIENT
Start: 2020-01-02

## 2020-01-02 RX ORDER — FENTANYL CITRATE 50 UG/ML
25 INJECTION, SOLUTION INTRAMUSCULAR; INTRAVENOUS
Status: CANCELLED | OUTPATIENT
Start: 2020-01-02 | End: 2020-01-06

## 2020-01-02 RX ORDER — DOCUSATE SODIUM 100 MG/1
100 CAPSULE, LIQUID FILLED ORAL 2 TIMES DAILY
Status: CANCELLED | OUTPATIENT
Start: 2020-01-02

## 2020-01-02 RX ORDER — IPRATROPIUM BROMIDE AND ALBUTEROL SULFATE 2.5; .5 MG/3ML; MG/3ML
3 SOLUTION RESPIRATORY (INHALATION) EVERY 4 HOURS
Status: CANCELLED | OUTPATIENT
Start: 2020-01-02 | End: 2020-01-03

## 2020-01-02 RX ORDER — OXYCODONE HYDROCHLORIDE 5 MG/1
10 TABLET ORAL EVERY 4 HOURS PRN
Status: CANCELLED | OUTPATIENT
Start: 2020-01-02

## 2020-01-02 RX ORDER — LIDOCAINE HYDROCHLORIDE 10 MG/ML
1 INJECTION, SOLUTION EPIDURAL; INFILTRATION; INTRACAUDAL; PERINEURAL
Status: CANCELLED | OUTPATIENT
Start: 2020-01-02

## 2020-01-02 RX ORDER — POTASSIUM CHLORIDE 14.9 MG/ML
20 INJECTION INTRAVENOUS
Status: CANCELLED | OUTPATIENT
Start: 2020-01-02

## 2020-01-02 RX ORDER — PANTOPRAZOLE SODIUM 40 MG/10ML
40 INJECTION, POWDER, LYOPHILIZED, FOR SOLUTION INTRAVENOUS DAILY
Status: CANCELLED | OUTPATIENT
Start: 2020-01-02

## 2020-01-02 RX ORDER — ACETAMINOPHEN 325 MG/1
650 TABLET ORAL EVERY 4 HOURS PRN
Status: CANCELLED | OUTPATIENT
Start: 2020-01-02

## 2020-01-02 RX ORDER — METOCLOPRAMIDE HYDROCHLORIDE 5 MG/ML
5 INJECTION INTRAMUSCULAR; INTRAVENOUS EVERY 6 HOURS PRN
Status: CANCELLED | OUTPATIENT
Start: 2020-01-02

## 2020-01-02 RX ORDER — MUPIROCIN 20 MG/G
1 OINTMENT TOPICAL
Status: CANCELLED | OUTPATIENT
Start: 2020-01-02

## 2020-01-02 RX ORDER — POLYETHYLENE GLYCOL 3350 17 G/17G
17 POWDER, FOR SOLUTION ORAL DAILY
Status: CANCELLED | OUTPATIENT
Start: 2020-01-02

## 2020-01-02 NOTE — ANESTHESIA PAT ROS NOTE
01/02/2020  Guillermo Gee is a 59 y.o., male.      Pre-op Assessment         Review of Systems  Anesthesia Hx:  No problems with previous Anesthesia Hx of Anesthetic complications Pt is an -requests smallest ETT as possible, and to remove it as soon as possible. Denies Family Hx of Anesthesia complications.   Denies Personal Hx of Anesthesia complications.   Social:  Social Alcohol Use  Tobacco Use:  of cigarette, < 1/2 a pack per day   Hematology/Oncology:  Hematology Normal   Oncology Normal     EENT/Dental:EENT/Dental Normal   Cardiovascular:    Denies Angina. hyperlipidemia  Functional Capacity good / => 4 METS  Coronary Artery Disease:  Valvular Heart Disease: (bicuspid aortic valve)    Thoracic Aortic Aneurysm    Pulmonary:   Denies Shortness of breath.  Denies Recent URI.    Renal/:   renal calculi    Hepatic/GI:  Bowel Conditions:  Inflammatory Bowel Disease (diverticulosis)    Musculoskeletal:  Musculoskeletal Normal    Neurological:  Neurology Normal    Endocrine:  Endocrine Normal    Psych:  Psychiatric Normal           Physical Exam  General:  Well nourished    Airway/Jaw/Neck:  Airway Findings: Mouth Opening: Normal Tongue: Normal  Jaw/Neck Findings:  Neck ROM: Normal ROM      Dental:  Dental Findings: In tact   Chest/Lungs:  Chest/Lungs Findings: Clear to auscultation     Heart/Vascular:  Heart Findings: Rate: Normal  Rhythm: Regular Rhythm        Mental Status:  Mental Status Findings:  Cooperative, Alert and Oriented         1/3/20 lab results noted.

## 2020-01-02 NOTE — PROGRESS NOTES
Subjective:      Patient ID: Guillermo Gee is a 58 y.o. male.     Chief Complaint: Consult        HPI:  Guillermo Gee is a 58 y.o. male who presents with HLD and TAA incidental finding on CT of chest for calcium scoring evaluation. Pt was referred for TAA surgical evaluation. Pt has no complaints. Pt here today for pre op.         Family and social history reviewed          Review of patient's allergies indicates:   Allergen Reactions    Codeine Itching           Past Medical History:   Diagnosis Date    Diverticulosis      Family history of prostate cancer in father      Kidney stones              Past Surgical History:   Procedure Laterality Date    INGUINAL HERNIA REPAIR Left      SHOULDER ARTHROSCOPY W/ ROTATOR CUFF REPAIR Bilateral 2011, 2012    UMBILICAL HERNIA REPAIR               Family History      Problem Relation (Age of Onset)     Colon cancer Paternal Uncle     No Known Problems Mother, Sister, Brother, Brother     Prostate cancer Father          Social History               Socioeconomic History    Marital status:        Spouse name: Not on file    Number of children: Not on file    Years of education: Not on file    Highest education level: Not on file   Occupational History    Occupation:        Comment: [employer]   Social [employer]    Financial resource strain: Not hard at all    Food insecurity:       Worry: Never true       Inability: Never true    Transportation needs:       Medical: No       Non-medical: No   Tobacco Use    Smoking status: Current Some Day Smoker       Types: Cigarettes       Start date: 12/27/1981    Smokeless tobacco: Never Used    Tobacco comment: occ cig use: ~ 3/day   Substance and Sexual Activity    Alcohol use: Yes       Alcohol/week: 6.0 - 8.0 standard drinks       Types: 3 - 4 Glasses of wine, 3 - 4 Standard drinks or equivalent per week       Frequency: 2-3 times a week       Drinks per session: 1 or 2       Binge frequency: Less  than monthly    Drug use: Never    Sexual activity: Yes       Partners: Female   Lifestyle    Physical activity:       Days per week: 0 days       Minutes per session: Not on file    Stress: Rather much   Relationships    Social connections:       Talks on phone: More than three times a week       Gets together: Once a week       Attends Anabaptist service: More than 4 times per year       Active member of club or organization: Yes       Attends meetings of clubs or organizations: More than 4 times per year       Relationship status:    Other Topics Concern    Not on file   Social History Narrative     He is not satisfied with weight.     Rates diet as poor.     He does not drink at least 1/2 gallon water daily.     He drinks 3-4 coffee/tea/caffeine-containing soft drinks daily.     Total sleep time at night is 6 hours.     He works 50-60 hours per week.     He does wear seat belts.     Hobbies include singing, fishing.            Current medications Reviewed     Review of Systems   Constitutional: Negative for activity change and fatigue.   Respiratory: Negative for shortness of breath.    Cardiovascular: Negative for chest pain, palpitations and leg swelling.   Gastrointestinal: Negative for abdominal pain, diarrhea and vomiting.   Endocrine: Negative for polydipsia, polyphagia and polyuria.   Genitourinary: Negative for dysuria.   Musculoskeletal: Negative for gait problem.   Skin: Negative for rash.   Allergic/Immunologic: Negative for immunocompromised state.   Neurological: Negative for dizziness, syncope and weakness.   Hematological: Does not bruise/bleed easily.   Psychiatric/Behavioral: Negative for behavioral problems.      Objective:   Physical Exam   Constitutional: He is oriented to person, place, and time. He appears well-developed and well-nourished.   HENT:   Head: Normocephalic and atraumatic.   Eyes: EOM are normal.   Neck: Normal range of motion.   Cardiovascular: Normal rate,  regular rhythm and normal heart sounds.   Pulmonary/Chest: Effort normal and breath sounds normal.   Abdominal: Soft.   Musculoskeletal: Normal range of motion.   Neurological: He is alert and oriented to person, place, and time.   Skin: Skin is warm, dry and intact. Capillary refill takes less than 2 seconds.   Psychiatric: He has a normal mood and affect.         Diagnostic Results:   CT chest:   There is fusiform dilatation of the ascending aorta that we measure at 5.4 cm     All diagnotics and labs reviewed.  Assessment:   1. TAA  Plan:         Ascending aortic replacement 1/6/20    CTS Attending Note:    I have personally taken the history and examined this patient and agree with the KELLEY's note as stated above.  Pleasant 59-year-old gentleman with ascending aortic aneurysm.  I discussed his situation with him in detail.  We discussed the echo findings of a bicuspid aortic valve, and that this likely explains the etiology of his aneurysm.  We reviewed the fact that his bicuspid valve appears to be working normally.  We discussed replacing the valve, including the options for replacement of tissue valve versus mechanical prosthesis.  Given the normal function of his valve, I recommended that we leave it alone.  He agreed with this.  We also discussed the possibility that intraoperatively, either on MARIANNE or visually, that a problem with the valve would be discovered.  If that is the case, he desires a tissue prosthesis.  We discussed the risks and benefits of the proposed procedure, including but not limited to death, stroke, respiratory complications, renal complications, arrythmia, and infection. His questions have been answered, and he wishes to proceed.

## 2020-01-02 NOTE — H&P (VIEW-ONLY)
Subjective:      Patient ID: Guillermo Gee is a 58 y.o. male.     Chief Complaint: Consult        HPI:  Guillermo Gee is a 58 y.o. male who presents with HLD and TAA incidental finding on CT of chest for calcium scoring evaluation. Pt was referred for TAA surgical evaluation. Pt has no complaints. Pt here today for pre op.         Family and social history reviewed          Review of patient's allergies indicates:   Allergen Reactions    Codeine Itching           Past Medical History:   Diagnosis Date    Diverticulosis      Family history of prostate cancer in father      Kidney stones              Past Surgical History:   Procedure Laterality Date    INGUINAL HERNIA REPAIR Left      SHOULDER ARTHROSCOPY W/ ROTATOR CUFF REPAIR Bilateral 2011, 2012    UMBILICAL HERNIA REPAIR               Family History      Problem Relation (Age of Onset)     Colon cancer Paternal Uncle     No Known Problems Mother, Sister, Brother, Brother     Prostate cancer Father          Social History               Socioeconomic History    Marital status:        Spouse name: Not on file    Number of children: Not on file    Years of education: Not on file    Highest education level: Not on file   Occupational History    Occupation:        Comment: [employer]   Social [employer]    Financial resource strain: Not hard at all    Food insecurity:       Worry: Never true       Inability: Never true    Transportation needs:       Medical: No       Non-medical: No   Tobacco Use    Smoking status: Current Some Day Smoker       Types: Cigarettes       Start date: 12/27/1981    Smokeless tobacco: Never Used    Tobacco comment: occ cig use: ~ 3/day   Substance and Sexual Activity    Alcohol use: Yes       Alcohol/week: 6.0 - 8.0 standard drinks       Types: 3 - 4 Glasses of wine, 3 - 4 Standard drinks or equivalent per week       Frequency: 2-3 times a week       Drinks per session: 1 or 2       Binge frequency: Less  than monthly    Drug use: Never    Sexual activity: Yes       Partners: Female   Lifestyle    Physical activity:       Days per week: 0 days       Minutes per session: Not on file    Stress: Rather much   Relationships    Social connections:       Talks on phone: More than three times a week       Gets together: Once a week       Attends Gnosticist service: More than 4 times per year       Active member of club or organization: Yes       Attends meetings of clubs or organizations: More than 4 times per year       Relationship status:    Other Topics Concern    Not on file   Social History Narrative     He is not satisfied with weight.     Rates diet as poor.     He does not drink at least 1/2 gallon water daily.     He drinks 3-4 coffee/tea/caffeine-containing soft drinks daily.     Total sleep time at night is 6 hours.     He works 50-60 hours per week.     He does wear seat belts.     Hobbies include singing, fishing.            Current medications Reviewed     Review of Systems   Constitutional: Negative for activity change and fatigue.   Respiratory: Negative for shortness of breath.    Cardiovascular: Negative for chest pain, palpitations and leg swelling.   Gastrointestinal: Negative for abdominal pain, diarrhea and vomiting.   Endocrine: Negative for polydipsia, polyphagia and polyuria.   Genitourinary: Negative for dysuria.   Musculoskeletal: Negative for gait problem.   Skin: Negative for rash.   Allergic/Immunologic: Negative for immunocompromised state.   Neurological: Negative for dizziness, syncope and weakness.   Hematological: Does not bruise/bleed easily.   Psychiatric/Behavioral: Negative for behavioral problems.      Objective:   Physical Exam   Constitutional: He is oriented to person, place, and time. He appears well-developed and well-nourished.   HENT:   Head: Normocephalic and atraumatic.   Eyes: EOM are normal.   Neck: Normal range of motion.   Cardiovascular: Normal rate,  regular rhythm and normal heart sounds.   Pulmonary/Chest: Effort normal and breath sounds normal.   Abdominal: Soft.   Musculoskeletal: Normal range of motion.   Neurological: He is alert and oriented to person, place, and time.   Skin: Skin is warm, dry and intact. Capillary refill takes less than 2 seconds.   Psychiatric: He has a normal mood and affect.         Diagnostic Results:   CT chest:   There is fusiform dilatation of the ascending aorta that we measure at 5.4 cm     All diagnotics and labs reviewed.  Assessment:   1. TAA  Plan:         Ascending aortic replacement 1/6/20    CTS Attending Note:    I have personally taken the history and examined this patient and agree with the KELLEY's note as stated above.  Pleasant 59-year-old gentleman with ascending aortic aneurysm.  I discussed his situation with him in detail.  We discussed the echo findings of a bicuspid aortic valve, and that this likely explains the etiology of his aneurysm.  We reviewed the fact that his bicuspid valve appears to be working normally.  We discussed replacing the valve, including the options for replacement of tissue valve versus mechanical prosthesis.  Given the normal function of his valve, I recommended that we leave it alone.  He agreed with this.  We also discussed the possibility that intraoperatively, either on MARIANNE or visually, that a problem with the valve would be discovered.  If that is the case, he desires a tissue prosthesis.  We discussed the risks and benefits of the proposed procedure, including but not limited to death, stroke, respiratory complications, renal complications, arrythmia, and infection. His questions have been answered, and he wishes to proceed.

## 2020-01-02 NOTE — DISCHARGE INSTRUCTIONS
Your surgery has been scheduled for:__________________________________________    You should report to:  ____[facility], located on the [place] side of the first floor of the           [facility] ([phone])  ____[facility], located on the [facility] side of the            Second floor of the [facility] ([phone])  ____[facility] ([facility]) Located at [address]  Please Note   - Tell your doctor if you take Aspirin, products containing Aspirin, herbal medications  or blood thinners, such as Coumadin, Ticlid, or Plavix.  (Consult your provider regarding holding or stopping before surgery).  - Arrange for someone to drive you home following surgery.  You will not be allowed to leave the surgical facility alone or drive yourself home following sedation and anesthesia.  Before Surgery  - Stop taking all herbal medications 14days prior to surgery  - No Motrin/Advil (Ibuprofen) 7 days before surgery  - No Aleve (Naproxen) 7 days before surgery  - Stop Taking Aspirin, products containing Aspirin _____days before surgery  - Stop taking blood thinners_______days before surgery  - No Goody's/BC  Powder 7 days before surgery  - Refrain from drinking alcoholic beverages for 24hours before and after surgery  - Stop or limit smoking _________days before surgery  - You may take Tylenol for pain  Night before Surgery   Stop ALL solid food, gum, candy (including vitamins) 8 hours before arrival time.  (Please note: If your surgeon gives you different eating and drinking instructions, please follow surgeon's directions.)   Stop all CLOUDY liquids: coffee with creamer, formula, tube feeds, cloudy juices, non-human milk and breast milk with additives, 6 hours prior to arrival time.   Stop plain breast milk 4 hours prior to arrival time.   The patient should be ENCOURAGED to drink carbohydrate-rich clear liquids (sports  drinks, clear juices) until 2 hours prior to arrival time.   CLEAR liquids include only water, black coffee NO creamer, clear oral rehydration drinks, clear sports drinks or clear fruit juices (no orange juice, no pulpy juices, no apple cider). Advise patients if they can read newsprint through the liquid, it qualifies as clear liquid.    IF IN DOUBT, drink water instead.   - Take a shower or bath (shower is recommended).  Bathe with Hibiclens soap or an antibacterial soap from the neck down.  If not supplied by your surgeon, hibiclens soap will need to be purchased over the counter in pharmacy.  Rinse soap off thoroughly.  - Shampoo your hair with your regular shampoo  The Day of Surgery  · NOTHING TO  DRINK 2 hours before arrival time. If you are told to take medication on the morning of surgery, it may be taken with a sip of water.   - Take another bath or shower with hibiclens or any antibacterial soap, to reduce the chance of infection.  - Take heart and blood pressure medications with a small sip of water, as advised by the perioperative team.  - Do not take fluid pills  - You may brush your teeth and rinse your mouth, but do not swallow any additional water.   - Do not apply perfumes, powder, body lotions or deodorant on the day of surgery.  - Nail polish should be removed.  - Do not wear makeup or moisturizer  - Wear comfortable clothes, such as a button front shirt and loose fitting pants.  - Leave all jewelry, including body piercings, and valuables at home.    - Bring any devices you will neeed after surgery such as crutches or canes.  - If you have sleep apnea, please bring your CPAP machine  In the event that your physical condition changes including the onset of a cold or respiratory illness, or if you have to delay or cancel your surgery, please notify your surgeon.  Anesthesia: General Anesthesia     You are watched continuously during your procedure by your anesthesia provider.     Youre due to  have surgery. During surgery, youll be given medicine called anesthesia or anesthetic. This will keep you comfortable and pain-free. Your anesthesia provider will use general anesthesia.  What is general anesthesia?  General anesthesia puts you into a state like deep sleep. It goes into the bloodstream (IV anesthetics), into the lungs (gas anesthetics), or both. You feel nothing during the procedure. You will not remember it. During the procedure, the anesthesia provider monitors you continuously. He or she checks your heart rate and rhythm, blood pressure, breathing, and blood oxygen.  · IV anesthetics. IV anesthetics are given through an IV line in your arm. Theyre often given first. This is so you are asleep before a gas anesthetic is started. Some kinds of IV anesthetics relieve pain. Others relax you. Your doctor will decide which kind is best in your case.  · Gas anesthetics. Gas anesthetics are breathed into the lungs. They are often used to keep you asleep. They can be given through a facemask or a tube placed in your larynx or trachea (breathing tube).  ? If you have a facemask, your anesthesia provider will most likely place it over your nose and mouth while youre still awake. Youll breathe oxygen through the mask as your IV anesthetic is started. Gas anesthetic may be added through the mask.  ? If you have a tube in the larynx or trachea, it will be inserted into your throat after youre asleep.  Anesthesia tools and medicines  You will likely have:  · IV anesthetics. These are put into an IV line into your bloodstream.  · Gas anesthetics. You breathe these anesthetics into your lungs, where they pass into your bloodstream.  · Pulse oximeter. This is a small clip that is attached to the end of your finger. This measures your blood oxygen level.  · Electrocardiography leads (electrodes). These are small sticky pads that are placed on your chest. They record your heart rate and rhythm.  · Blood pressure  cuff. This reads your blood pressure.  Risks and possible complications  General anesthesia has some risks. These include:  · Breathing problems  · Nausea and vomiting  · Sore throat or hoarseness (usually temporary)  · Allergic reaction to the anesthetic  · Irregular heartbeat (rare)  · Cardiac arrest (rare)   Anesthesia safety  · Follow all instructions you are given for how long not to eat or drink before your procedure.  · Be sure your doctor knows what medicines and drugs you take. This includes over-the-counter medicines, herbs, supplements, alcohol or other drugs. You will be asked when those were last taken.  · Have an adult family member or friend drive you home after the procedure.  · For the first 24 hours after your surgery:  ? Do not drive or use heavy equipment.  ? Do not make important decisions or sign legal documents. If important decisions or signing legal documents is necessary during the first 24 hours after surgery, have a trusted family member or spouse act on your behalf.  ? Avoid alcohol.  ? Have a responsible adult stay with you. He or she can watch for problems and help keep you safe.  Date Last Reviewed: 12/1/2016 © 2000-2017 [employer]. [address] [place] [address]. All rights reserved. This information is not intended as a substitute for professional medical care. Always follow your healthcare professional's instructions

## 2020-01-02 NOTE — PROGRESS NOTES
"PREPARING FOR SURGERY  Your surgery has been scheduled for:   Day: _Monday_____ Date:  _0-1-70______  Arrival Time: 5A  Start Time: 7A  You should report to the second floor surgery center, located on the [facility] side of the second floor of the [facility]. The phone number is [phone].     PLEASE NOTE  · If you are allergic to any medications, please inform your doctor or the nurse responsible for your care.  · Tell the doctor if you take aspirin, products containing aspirin, herbal medications or blood thinners, such as Coumadin, Pradaxa, or Plavix.  · Notify your doctor if you are diabetic and provide information about the medications you take.  · Arrange for someone to drive you home following surgery. You will not be allowed to leave the surgical facility alone or drive yourself home following sedation and anesthesia.  · If you have not already done so, please bring a list of your medications with you the day of your surgery.     BEFORE SURGERY  Stop taking all herbal medications 14 days prior to surgery  Stop taking aspirin, products containing aspirin 0 days before surgery  Stop taking blood thinners 5 days before surgery  Refrain from drinking alcohol beverages for 24 hours before and after surgery  Stop or limit smoking 0 days before surgery  Other: ________________________________________________________     THE NIGHT BEFORE SURGERY  Eat a light supper on the night before your surgery, no greasy or fatty foods.  DO NOT EAT OR DRINK ANYTHING AFTER MIDNIGHT, INCLUDING GUM, HARD CANDY, MINTS, OR CHEWING TOBACCO  Take a complete shower. Wash your body from the neck down with Hibiclens (chlorhexidine gluconate) soap. Hibiclens soap may be purchased over the counter at the pharmacy. Keep the soap away from your eyes, ears, and mouth. After washing with Hibiclens, rinse thoroughly. You may also use any soap labeled "antibacterial". Shampoo your hair with your regular shampoo.     THE DAY OF " SURGERY  Take another shower with Hibiclens or any antibacterial soap, to reduce the change of infection.  Medications to take the morning of surgery:_N/A__ with a small sip of water. Do not take diuretics or fluid pills.  Diabetic medication instructions will be given by the preop center. They will call you before your surgery.  You may brush your teeth and rinse your mouth, but do not shallow any water.  Do not apply perfume, powder, body lotions or deodorant on the day of surgery.  Do not wear any makeup, including mascara and false eyelashes.  Nail polish should be removed.  Wear comfortable clothes, such as button front shirt and loose-fitting pants.  Leave all jewelry, including body piercings and valuables at home.  Hairpins and clasps must be removed before you enter the operating room.  You may wear glasses, dentures and hearing aids before and after surgery. They may need to be removed before going into the operating room. Contact lenses worn before surgery must be removed before entering the operating room. Please bring a case for your hearing aids, glasses and/or contacts.  Bring any devices you will need after surgery such as crutches or canes.  If you have sleep apnea, please bring your CPAP machine.  If you have an implantable device, such as a pacemaker or AICD, please bring the device information card, if you have one.     If you have any questions or concerns, please don't hesitate to call.     Eliz Maravilla RN  RN Clinician  Thoracic and Cardiovascular Surgery  [facility] [place], [place] [id number] [phone] fax

## 2020-01-03 ENCOUNTER — ANESTHESIA EVENT (OUTPATIENT)
Dept: SURGERY | Facility: HOSPITAL | Age: 60
DRG: 220 | End: 2020-01-03
Payer: COMMERCIAL

## 2020-01-05 NOTE — ANESTHESIA PREPROCEDURE EVALUATION
[facility]  Anesthesia Pre-Operative Evaluation         Patient Name: Guillermo Gee  YOB: 1960  MRN: [id number]    SUBJECTIVE:     Pre-operative evaluation for Procedure(s) (LRB):  REPLACEMENT, AORTA, ASCENDING (N/A)  Replacement-valve-aortic (N/A)     01/05/2020    Guillermo Gee is a 59 y.o. male w/ a significant PMHx of HLD and CAD. Work-up for coronary calcium score with Cardiac CT incidentally noted an ascending aortic aneurysm of 5.4 cm. Decision made to surgically repair given size.    Patient now presents for the above procedure(s).      LDA: None documented.    Prev airway: None documented.    Drips: None documented.    Patient Active Problem List   Diagnosis    Diverticulosis    Family history of prostate cancer in father    Kidney stones    Hypercholesterolemia    [place] cardiac risk 10-20% in next 10 years    Aneurysm of ascending aorta    Thoracic aortic aneurysm without rupture    Bicuspid aortic valve    CAD (coronary artery disease)    Pre-op exam       Review of patient's allergies indicates:   Allergen Reactions    Codeine Itching       Current Outpatient Medications:  No current facility-administered medications for this encounter.     Current Outpatient Medications:     aspirin (ECOTRIN) 81 MG EC tablet, Take 1 tablet (81 mg total) by mouth once daily., Disp: , Rfl: 0    b complex vitamins tablet, Take 1 tablet by mouth once daily., Disp: , Rfl:     biotin/calcium carbonate (BIOTIN 100+10 ORAL), Take by mouth., Disp: , Rfl:     omega-3 fatty acids/fish oil (FISH OIL-OMEGA-3 FATTY ACIDS) 300-1,000 mg capsule, Take 2 capsules by mouth once daily., Disp: , Rfl:     Past Surgical History:   Procedure Laterality Date    INGUINAL HERNIA REPAIR Left     LEFT HEART CATHETERIZATION Left 12/19/2019    Procedure: CATHETERIZATION, HEART, LEFT;  Surgeon: John Kyle MD;  Location[facility] CATH LAB;  Service: Cardiology;  Laterality: Left;     SHOULDER ARTHROSCOPY W/ ROTATOR CUFF REPAIR Bilateral 2011, 2012    UMBILICAL HERNIA REPAIR         Social History     Socioeconomic History    Marital status: Single     Spouse name: Not on file    Number of children: Not on file    Years of education: Not on file    Highest education level: Not on file   Occupational History    Occupation:      Comment: [employer]   Social Needs    Financial resource strain: Not hard at all    Food insecurity:     Worry: Never true     Inability: Never true    Transportation needs:     Medical: No     Non-medical: No   Tobacco Use    Smoking status: Current Some Day Smoker     Types: Cigarettes     Start date: 12/27/1981    Smokeless tobacco: Never Used    Tobacco comment: occ cig use: ~ 3/day   Substance and Sexual Activity    Alcohol use: Yes     Alcohol/week: 6.0 - 8.0 standard drinks     Types: 3 - 4 Glasses of wine, 3 - 4 Standard drinks or equivalent per week     Frequency: 2-3 times a week     Drinks per session: 1 or 2     Binge frequency: Less than monthly    Drug use: Never    Sexual activity: Yes     Partners: Female   Lifestyle    Physical activity:     Days per week: 0 days     Minutes per session: Not on file    Stress: Rather much   Relationships    Social connections:     Talks on phone: More than three times a week     Gets together: Once a week     Attends [employer] service: More than 4 times per year     Active member of club or organization: Yes     Attends meetings of clubs or organizations: More than 4 times per year     Relationship status:    Other Topics Concern    Not on file   Social History Narrative    He is not satisfied with weight.    Rates diet as poor.    He does not drink at least 1/2 gallon water daily.    He drinks 3-4 coffee/tea/caffeine-containing soft drinks daily.    Total sleep time at night is 6 hours.    He works 50-60 hours per week.    He does wear seat belts.    Hobbies include singing,  quentin.       OBJECTIVE:     Vital Signs Range (Last 24H):         Significant Labs:  Lab Results   Component Value Date    WBC 5.32 01/02/2020    HGB 15.8 01/02/2020    HCT 47.8 01/02/2020     01/02/2020    ALT 16 01/02/2020    AST 23 01/02/2020     01/02/2020    K 3.8 01/02/2020     01/02/2020    CREATININE 1.2 01/02/2020    BUN 17 01/02/2020    CO2 27 01/02/2020    TSH 2.777 10/04/2019    PSA 0.95 10/04/2019    INR 1.0 01/02/2020    HGBA1C 5.7 (H) 01/02/2020       Diagnostic Studies: No relevant studies.    EKG:   Results for orders placed or performed during the hospital encounter of 01/02/20   EKG 12-lead    Collection Time: 01/02/20  2:35 PM    Narrative    Test Reason : I71.2,    Vent. Rate : 059 BPM     Atrial Rate : 059 BPM     P-R Int : 168 ms          QRS Dur : 096 ms      QT Int : 400 ms       P-R-T Axes : 050 -27 018 degrees     QTc Int : 396 ms    Sinus bradycardia  Otherwise normal ECG  No previous ECGs available  Confirmed by Danette Silvestre MD ([id number]) on 1/2/2020 4:07:13 PM    Referred By: AMANDA BAILEY           Confirmed By:Danette Silvestre MD       2D ECHO:  TTE:  Results for orders placed or performed during the hospital encounter of 12/05/19   Echo Color Flow Doppler? Yes   Result Value Ref Range    BSA 2.14 m2    TDI SEPTAL 0.08 m/s    LV LATERAL E/E' RATIO 7.30 m/s    LV SEPTAL E/E' RATIO 9.13 m/s    LA WIDTH 4.00 cm    TDI LATERAL 0.10 m/s    LVIDD 5.20 3.5 - 6.0 cm    IVS 0.98 0.6 - 1.1 cm    PW 0.80 0.6 - 1.1 cm    LVIDS 3.68 2.1 - 4.0 cm    FS 29 28 - 44 %    LA volume 66.27 cm3    Sinus 3.86 cm    STJ 4.32 cm    Ascending aorta 5.50 cm    LV mass 166.55 g    LA size 3.99 cm    RVDD 3.33 cm    TAPSE 1.91 cm    Left Ventricle Relative Wall Thickness 0.31 cm    AV mean gradient 5 mmHg    AV valve area 2.42 cm2    AV Velocity Ratio 0.49     AV index (prosthetic) 0.46     E/A ratio 1.28     Mean e' 0.09 m/s    E wave decelartion time 146.92 msec    Pulm vein S/D ratio 1.42      LVOT diameter 2.58 cm    LVOT area 5.2 cm2    LVOT peak tal 0.79 m/s    LVOT peak VTI 15.86 cm    Ao peak tal 1.61 m/s    Ao VTI 34.28 cm    LVOT stroke volume 82.87 cm3    AV peak gradient 10 mmHg    E/E' ratio 8.11 m/s    MV Peak E Tal 0.73 m/s    TR Max Tal 2.23 m/s    MV Peak A Tal 0.57 m/s    PV Peak S Tal 0.61 m/s    PV Peak D Tal 0.43 m/s    LV Systolic Volume 57.37 mL    LV Systolic Volume Index 27.2 mL/m2    LV Diastolic Volume 129.33 mL    LV Diastolic Volume Index 61.31 mL/m2    LA Volume Index 31.4 mL/m2    LV Mass Index 79 g/m2    RA Major Axis 4.96 cm    Left Atrium Minor Axis 4.88 cm    Left Atrium Major Axis 4.89 cm    Triscuspid Valve Regurgitation Peak Gradient 20 mmHg    RA Width 3.22 cm    Right Atrial Pressure (from IVC) 3 mmHg    TV rest pulmonary artery pressure 23 mmHg    Narrative    · Normal left ventricular systolic function. The estimated ejection   fraction is 55%  · Normal LV diastolic function.  · No wall motion abnormalities.  · Normal right ventricular systolic function.  · The aortic valve is bileaflet ( see text)  · Mild tricuspid regurgitation.  · The estimated PA systolic pressure is 23 mm Hg  · Normal central venous pressure (3 mm Hg).  · The sinuses of Valsalva is mildly dilated.  · The aortic root is mildly dilated.  · The ascending aorta is severely dilated ( aneurysmal 5.5cm)          [facility] (12/19/2019):  · 40% proximal RCA stenosis    ASSESSMENT/PLAN:         Anesthesia Evaluation         Review of Systems  Anesthesia Hx:  No problems with previous Anesthesia Hx of Anesthetic complications Pt is an -requests smallest ETT as possible, and to remove it as soon as possible. Denies Family Hx of Anesthesia complications.  Denies Personal Hx of Anesthesia complications.   Social:  Social Alcohol Use  Tobacco Use:  of cigarette, < 1/2 a pack per day   Hematology/Oncology:  Hematology Normal   Oncology Normal     EENT/Dental:EENT/Dental Normal   Cardiovascular:    Denies  Angina. hyperlipidemia  Functional Capacity good / => 4 METS  Coronary Artery Disease:  Valvular Heart Disease: (bicuspid aortic valve)    Thoracic Aortic Aneurysm    Pulmonary:   Denies Shortness of breath.  Denies Recent URI.    Renal/:   renal calculi    Hepatic/GI:  Bowel Conditions:  Inflammatory Bowel Disease (diverticulosis)    Musculoskeletal:  Musculoskeletal Normal    Neurological:  Neurology Normal    Endocrine:  Endocrine Normal    Psych:  Psychiatric Normal           Physical Exam  General:  Well nourished    Airway/Jaw/Neck:  Airway Findings: Mouth Opening: Normal Tongue: Normal  Jaw/Neck Findings:  Neck ROM: Normal ROM      Dental:  Dental Findings: In tact   Chest/Lungs:  Chest/Lungs Findings: Clear to auscultation     Heart/Vascular:  Heart Findings: Rate: Normal  Rhythm: Regular Rhythm        Mental Status:  Mental Status Findings:  Cooperative, Alert and Oriented         1/3/20 lab results noted.      Anesthesia Plan  Type of Anesthesia, risks & benefits discussed:  Anesthesia Type:  general  Patient's Preference:   Intra-op Monitoring Plan: arterial line, central line and standard ASA monitors  Intra-op Monitoring Plan Comments:   Post Op Pain Control Plan: per primary service following discharge from PACU, IV/PO Opioids PRN and multimodal analgesia  Post Op Pain Control Plan Comments:   Induction:   IV  Beta Blocker:  Patient is not currently on a Beta-Blocker (No further documentation required).       Informed Consent:    ASA Score: 4     Day of Surgery Review of History & Physical:    H&P update referred to the provider.         Ready For Surgery From Anesthesia Perspective.

## 2020-01-06 ENCOUNTER — HOSPITAL ENCOUNTER (INPATIENT)
Facility: HOSPITAL | Age: 60
LOS: 4 days | Discharge: HOME | DRG: 220 | End: 2020-01-10
Attending: THORACIC SURGERY (CARDIOTHORACIC VASCULAR SURGERY) | Admitting: THORACIC SURGERY (CARDIOTHORACIC VASCULAR SURGERY)
Payer: COMMERCIAL

## 2020-01-06 ENCOUNTER — ANESTHESIA (OUTPATIENT)
Dept: SURGERY | Facility: HOSPITAL | Age: 60
DRG: 220 | End: 2020-01-06
Payer: COMMERCIAL

## 2020-01-06 DIAGNOSIS — I71.21 ANEURYSM OF ASCENDING AORTA: ICD-10-CM

## 2020-01-06 DIAGNOSIS — I71.21 ASCENDING AORTIC ANEURYSM: ICD-10-CM

## 2020-01-06 DIAGNOSIS — Z95.828 S/P ASCENDING AORTIC REPLACEMENT: Primary | ICD-10-CM

## 2020-01-06 PROBLEM — R73.03 PRE-DIABETES: Status: ACTIVE | Noted: 2020-01-06

## 2020-01-06 LAB
ALLENS TEST: ABNORMAL
ALLENS TEST: ABNORMAL
ALLENS TEST: NORMAL
ANION GAP SERPL CALC-SCNC: 5 MMOL/L (ref 8–16)
APTT BLDCRRT: 35.6 SEC (ref 21–32)
BASOPHILS # BLD AUTO: 0.02 K/UL (ref 0–0.2)
BASOPHILS NFR BLD: 0.1 % (ref 0–1.9)
BUN SERPL-MCNC: 9 MG/DL (ref 6–20)
CALCIUM SERPL-MCNC: 7.7 MG/DL (ref 8.7–10.5)
CHLORIDE SERPL-SCNC: 109 MMOL/L (ref 95–110)
CO2 SERPL-SCNC: 24 MMOL/L (ref 23–29)
CREAT SERPL-MCNC: 0.9 MG/DL (ref 0.5–1.4)
DIFFERENTIAL METHOD: ABNORMAL
EOSINOPHIL # BLD AUTO: 0 K/UL (ref 0–0.5)
EOSINOPHIL NFR BLD: 0.1 % (ref 0–8)
ERYTHROCYTE [DISTWIDTH] IN BLOOD BY AUTOMATED COUNT: 13 % (ref 11.5–14.5)
EST. GFR  (AFRICAN AMERICAN): >60 ML/MIN/1.73 M^2
EST. GFR  (NON AFRICAN AMERICAN): >60 ML/MIN/1.73 M^2
FIO2: 1
FIO2: 60
GLUCOSE SERPL-MCNC: 117 MG/DL (ref 70–110)
GLUCOSE SERPL-MCNC: 141 MG/DL (ref 70–110)
GLUCOSE SERPL-MCNC: 148 MG/DL (ref 70–110)
GLUCOSE SERPL-MCNC: 159 MG/DL (ref 70–110)
GLUCOSE SERPL-MCNC: 169 MG/DL (ref 70–110)
HCO3 UR-SCNC: 23.5 MMOL/L (ref 24–28)
HCO3 UR-SCNC: 23.6 MMOL/L (ref 24–28)
HCO3 UR-SCNC: 24.3 MMOL/L (ref 24–28)
HCO3 UR-SCNC: 24.8 MMOL/L (ref 24–28)
HCO3 UR-SCNC: 25.2 MMOL/L (ref 24–28)
HCO3 UR-SCNC: 25.2 MMOL/L (ref 24–28)
HCO3 UR-SCNC: 25.8 MMOL/L (ref 24–28)
HCO3 UR-SCNC: 26 MMOL/L (ref 24–28)
HCO3 UR-SCNC: 27.2 MMOL/L (ref 24–28)
HCO3 UR-SCNC: 30.2 MMOL/L (ref 24–28)
HCT VFR BLD AUTO: 37.4 % (ref 40–54)
HCT VFR BLD CALC: 30 %PCV (ref 36–54)
HCT VFR BLD CALC: 32 %PCV (ref 36–54)
HCT VFR BLD CALC: 32 %PCV (ref 36–54)
HCT VFR BLD CALC: 34 %PCV (ref 36–54)
HCT VFR BLD CALC: 36 %PCV (ref 36–54)
HGB BLD-MCNC: 12.3 G/DL (ref 14–18)
IMM GRANULOCYTES # BLD AUTO: 0.1 K/UL (ref 0–0.04)
IMM GRANULOCYTES NFR BLD AUTO: 0.7 % (ref 0–0.5)
INR PPP: 1.2 (ref 0.8–1.2)
LDH SERPL L TO P-CCNC: 0.77 MMOL/L (ref 0.36–1.25)
LDH SERPL L TO P-CCNC: 0.95 MMOL/L (ref 0.5–2.2)
LDH SERPL L TO P-CCNC: 0.97 MMOL/L (ref 0.36–1.25)
LYMPHOCYTES # BLD AUTO: 1.3 K/UL (ref 1–4.8)
LYMPHOCYTES NFR BLD: 9 % (ref 18–48)
MAGNESIUM SERPL-MCNC: 2.2 MG/DL (ref 1.6–2.6)
MAGNESIUM SERPL-MCNC: 2.3 MG/DL (ref 1.6–2.6)
MCH RBC QN AUTO: 29.8 PG (ref 27–31)
MCHC RBC AUTO-ENTMCNC: 32.9 G/DL (ref 32–36)
MCV RBC AUTO: 91 FL (ref 82–98)
MONOCYTES # BLD AUTO: 1 K/UL (ref 0.3–1)
MONOCYTES NFR BLD: 6.6 % (ref 4–15)
NEUTROPHILS # BLD AUTO: 12 K/UL (ref 1.8–7.7)
NEUTROPHILS NFR BLD: 83.5 % (ref 38–73)
NRBC BLD-RTO: 0 /100 WBC
PCO2 BLDA: 38.2 MMHG (ref 35–45)
PCO2 BLDA: 39 MMHG (ref 35–45)
PCO2 BLDA: 40.6 MMHG (ref 35–45)
PCO2 BLDA: 41.8 MMHG (ref 35–45)
PCO2 BLDA: 43.4 MMHG (ref 35–45)
PCO2 BLDA: 43.5 MMHG (ref 35–45)
PCO2 BLDA: 44.1 MMHG (ref 35–45)
PCO2 BLDA: 45.2 MMHG (ref 35–45)
PCO2 BLDA: 46.2 MMHG (ref 35–45)
PCO2 BLDA: 47.4 MMHG (ref 35–45)
PH SMN: 7.33 [PH] (ref 7.35–7.45)
PH SMN: 7.36 [PH] (ref 7.35–7.45)
PH SMN: 7.37 [PH] (ref 7.35–7.45)
PH SMN: 7.37 [PH] (ref 7.35–7.45)
PH SMN: 7.38 [PH] (ref 7.35–7.45)
PH SMN: 7.39 [PH] (ref 7.35–7.45)
PH SMN: 7.39 [PH] (ref 7.35–7.45)
PH SMN: 7.4 [PH] (ref 7.35–7.45)
PH SMN: 7.42 [PH] (ref 7.35–7.45)
PH SMN: 7.43 [PH] (ref 7.35–7.45)
PHOSPHATE SERPL-MCNC: 3.3 MG/DL (ref 2.7–4.5)
PHOSPHATE SERPL-MCNC: 3.4 MG/DL (ref 2.7–4.5)
PLATELET # BLD AUTO: 147 K/UL (ref 150–350)
PMV BLD AUTO: 9.9 FL (ref 9.2–12.9)
PO2 BLDA: 140 MMHG (ref 80–100)
PO2 BLDA: 141 MMHG (ref 80–100)
PO2 BLDA: 153 MMHG (ref 80–100)
PO2 BLDA: 161 MMHG (ref 80–100)
PO2 BLDA: 251 MMHG (ref 80–100)
PO2 BLDA: 267 MMHG (ref 80–100)
PO2 BLDA: 43 MMHG (ref 40–60)
PO2 BLDA: 587 MMHG (ref 80–100)
PO2 BLDA: 83 MMHG (ref 80–100)
PO2 BLDA: 93 MMHG (ref 80–100)
POC BE: -1 MMOL/L
POC BE: -2 MMOL/L
POC BE: 0 MMOL/L
POC BE: 1 MMOL/L
POC BE: 2 MMOL/L
POC BE: 6 MMOL/L
POC IONIZED CALCIUM: 0.93 MMOL/L (ref 1.06–1.42)
POC IONIZED CALCIUM: 0.97 MMOL/L (ref 1.06–1.42)
POC IONIZED CALCIUM: 1.03 MMOL/L (ref 1.06–1.42)
POC IONIZED CALCIUM: 1.09 MMOL/L (ref 1.06–1.42)
POC IONIZED CALCIUM: 1.14 MMOL/L (ref 1.06–1.42)
POC IONIZED CALCIUM: 1.16 MMOL/L (ref 1.06–1.42)
POC IONIZED CALCIUM: 1.18 MMOL/L (ref 1.06–1.42)
POC PCO2 TEMP: 46.2 MMHG
POC PH TEMP: 7.42
POC PO2 TEMP: 587 MMHG
POC SATURATED O2: 100 % (ref 95–100)
POC SATURATED O2: 77 % (ref 95–100)
POC SATURATED O2: 96 % (ref 95–100)
POC SATURATED O2: 97 % (ref 95–100)
POC SATURATED O2: 99 % (ref 95–100)
POC TCO2: 25 MMOL/L (ref 23–27)
POC TCO2: 25 MMOL/L (ref 23–27)
POC TCO2: 26 MMOL/L (ref 23–27)
POC TCO2: 27 MMOL/L (ref 23–27)
POC TCO2: 27 MMOL/L (ref 23–27)
POC TCO2: 27 MMOL/L (ref 24–29)
POC TCO2: 29 MMOL/L (ref 23–27)
POC TCO2: 32 MMOL/L (ref 23–27)
POC TEMPERATURE: ABNORMAL
POCT GLUCOSE: 107 MG/DL (ref 70–110)
POCT GLUCOSE: 111 MG/DL (ref 70–110)
POCT GLUCOSE: 114 MG/DL (ref 70–110)
POCT GLUCOSE: 117 MG/DL (ref 70–110)
POCT GLUCOSE: 131 MG/DL (ref 70–110)
POTASSIUM BLD-SCNC: 3.5 MMOL/L (ref 3.5–5.1)
POTASSIUM BLD-SCNC: 4.1 MMOL/L (ref 3.5–5.1)
POTASSIUM BLD-SCNC: 4.1 MMOL/L (ref 3.5–5.1)
POTASSIUM BLD-SCNC: 4.4 MMOL/L (ref 3.5–5.1)
POTASSIUM BLD-SCNC: 4.5 MMOL/L (ref 3.5–5.1)
POTASSIUM BLD-SCNC: 4.9 MMOL/L (ref 3.5–5.1)
POTASSIUM BLD-SCNC: 5.7 MMOL/L (ref 3.5–5.1)
POTASSIUM SERPL-SCNC: 4.2 MMOL/L (ref 3.5–5.1)
POTASSIUM SERPL-SCNC: 4.6 MMOL/L (ref 3.5–5.1)
PROTHROMBIN TIME: 11.9 SEC (ref 9–12.5)
RBC # BLD AUTO: 4.13 M/UL (ref 4.6–6.2)
SAMPLE: ABNORMAL
SAMPLE: NORMAL
SITE: ABNORMAL
SITE: ABNORMAL
SITE: NORMAL
SODIUM BLD-SCNC: 136 MMOL/L (ref 136–145)
SODIUM BLD-SCNC: 137 MMOL/L (ref 136–145)
SODIUM BLD-SCNC: 138 MMOL/L (ref 136–145)
SODIUM BLD-SCNC: 138 MMOL/L (ref 136–145)
SODIUM BLD-SCNC: 139 MMOL/L (ref 136–145)
SODIUM BLD-SCNC: 139 MMOL/L (ref 136–145)
SODIUM BLD-SCNC: 140 MMOL/L (ref 136–145)
SODIUM SERPL-SCNC: 138 MMOL/L (ref 136–145)
WBC # BLD AUTO: 14.4 K/UL (ref 3.9–12.7)

## 2020-01-06 PROCEDURE — 85025 COMPLETE CBC W/AUTO DIFF WBC: CPT

## 2020-01-06 PROCEDURE — 94640 AIRWAY INHALATION TREATMENT: CPT

## 2020-01-06 PROCEDURE — 25000242 PHARM REV CODE 250 ALT 637 W/ HCPCS: Performed by: NURSE PRACTITIONER

## 2020-01-06 PROCEDURE — 93312 ECHO TRANSESOPHAGEAL: CPT | Mod: 26,59,, | Performed by: ANESTHESIOLOGY

## 2020-01-06 PROCEDURE — 82330 ASSAY OF CALCIUM: CPT

## 2020-01-06 PROCEDURE — 25000003 PHARM REV CODE 250: Performed by: NURSE PRACTITIONER

## 2020-01-06 PROCEDURE — 84132 ASSAY OF SERUM POTASSIUM: CPT

## 2020-01-06 PROCEDURE — 88305 TISSUE EXAM BY PATHOLOGIST: CPT | Mod: 26,,, | Performed by: PATHOLOGY

## 2020-01-06 PROCEDURE — 63600175 PHARM REV CODE 636 W HCPCS: Performed by: NURSE PRACTITIONER

## 2020-01-06 PROCEDURE — 63600175 PHARM REV CODE 636 W HCPCS: Performed by: STUDENT IN AN ORGANIZED HEALTH CARE EDUCATION/TRAINING PROGRAM

## 2020-01-06 PROCEDURE — C1768 GRAFT, VASCULAR: HCPCS | Performed by: THORACIC SURGERY (CARDIOTHORACIC VASCULAR SURGERY)

## 2020-01-06 PROCEDURE — 33859 AS-AORT GRF F/DS OTH/THN DSJ: CPT | Mod: ,,, | Performed by: THORACIC SURGERY (CARDIOTHORACIC VASCULAR SURGERY)

## 2020-01-06 PROCEDURE — 83735 ASSAY OF MAGNESIUM: CPT | Mod: 91

## 2020-01-06 PROCEDURE — 37000009 HC ANESTHESIA EA ADD 15 MINS: Performed by: THORACIC SURGERY (CARDIOTHORACIC VASCULAR SURGERY)

## 2020-01-06 PROCEDURE — 99222 PR INITIAL HOSPITAL CARE,LEVL II: ICD-10-PCS | Mod: ,,, | Performed by: NURSE PRACTITIONER

## 2020-01-06 PROCEDURE — 99291 CRITICAL CARE FIRST HOUR: CPT | Mod: ,,, | Performed by: SURGERY

## 2020-01-06 PROCEDURE — 36000713 HC OR TIME LEV V EA ADD 15 MIN: Performed by: THORACIC SURGERY (CARDIOTHORACIC VASCULAR SURGERY)

## 2020-01-06 PROCEDURE — 25000003 PHARM REV CODE 250: Performed by: SURGERY

## 2020-01-06 PROCEDURE — 88305 TISSUE EXAM BY PATHOLOGIST: CPT | Performed by: PATHOLOGY

## 2020-01-06 PROCEDURE — 76937 US GUIDE VASCULAR ACCESS: CPT | Mod: 26,,, | Performed by: ANESTHESIOLOGY

## 2020-01-06 PROCEDURE — 63600175 PHARM REV CODE 636 W HCPCS: Performed by: ANESTHESIOLOGY

## 2020-01-06 PROCEDURE — 85014 HEMATOCRIT: CPT

## 2020-01-06 PROCEDURE — C1729 CATH, DRAINAGE: HCPCS | Performed by: THORACIC SURGERY (CARDIOTHORACIC VASCULAR SURGERY)

## 2020-01-06 PROCEDURE — 27200953 HC CARDIOPLEGIA SYSTEM

## 2020-01-06 PROCEDURE — 93010 ELECTROCARDIOGRAM REPORT: CPT | Mod: ,,, | Performed by: INTERNAL MEDICINE

## 2020-01-06 PROCEDURE — 25000003 PHARM REV CODE 250: Performed by: STUDENT IN AN ORGANIZED HEALTH CARE EDUCATION/TRAINING PROGRAM

## 2020-01-06 PROCEDURE — 27000191 HC C-V MONITORING

## 2020-01-06 PROCEDURE — 36620 INSERTION CATHETER ARTERY: CPT | Mod: 59,,, | Performed by: ANESTHESIOLOGY

## 2020-01-06 PROCEDURE — 84295 ASSAY OF SERUM SODIUM: CPT

## 2020-01-06 PROCEDURE — 82803 BLOOD GASES ANY COMBINATION: CPT

## 2020-01-06 PROCEDURE — 36592 COLLECT BLOOD FROM PICC: CPT

## 2020-01-06 PROCEDURE — 27000175 HC ADULT BYPASS PUMP

## 2020-01-06 PROCEDURE — 84100 ASSAY OF PHOSPHORUS: CPT

## 2020-01-06 PROCEDURE — 20000000 HC ICU ROOM

## 2020-01-06 PROCEDURE — 80048 BASIC METABOLIC PNL TOTAL CA: CPT

## 2020-01-06 PROCEDURE — 93005 ELECTROCARDIOGRAM TRACING: CPT

## 2020-01-06 PROCEDURE — 85520 HEPARIN ASSAY: CPT

## 2020-01-06 PROCEDURE — 27000221 HC OXYGEN, UP TO 24 HOURS

## 2020-01-06 PROCEDURE — 76937 PR  US GUIDE, VASCULAR ACCESS: ICD-10-PCS | Mod: 26,,, | Performed by: ANESTHESIOLOGY

## 2020-01-06 PROCEDURE — 94799 UNLISTED PULMONARY SVC/PX: CPT

## 2020-01-06 PROCEDURE — 33859 PR GRAFT, ASC AORTA, W/CPB, W/VALVE SUSP, OTHER AORTIC DISEASE: ICD-10-PCS | Mod: ,,, | Performed by: THORACIC SURGERY (CARDIOTHORACIC VASCULAR SURGERY)

## 2020-01-06 PROCEDURE — 36000712 HC OR TIME LEV V 1ST 15 MIN: Performed by: THORACIC SURGERY (CARDIOTHORACIC VASCULAR SURGERY)

## 2020-01-06 PROCEDURE — 36620 ARTERIAL: ICD-10-PCS | Mod: 59,,, | Performed by: ANESTHESIOLOGY

## 2020-01-06 PROCEDURE — 63600175 PHARM REV CODE 636 W HCPCS: Mod: JG | Performed by: STUDENT IN AN ORGANIZED HEALTH CARE EDUCATION/TRAINING PROGRAM

## 2020-01-06 PROCEDURE — 37799 UNLISTED PX VASCULAR SURGERY: CPT

## 2020-01-06 PROCEDURE — 37000008 HC ANESTHESIA 1ST 15 MINUTES: Performed by: THORACIC SURGERY (CARDIOTHORACIC VASCULAR SURGERY)

## 2020-01-06 PROCEDURE — 25000003 PHARM REV CODE 250: Performed by: ANESTHESIOLOGY

## 2020-01-06 PROCEDURE — 99222 1ST HOSP IP/OBS MODERATE 55: CPT | Mod: ,,, | Performed by: NURSE PRACTITIONER

## 2020-01-06 PROCEDURE — 27201423 OPTIME MED/SURG SUP & DEVICES STERILE SUPPLY: Performed by: THORACIC SURGERY (CARDIOTHORACIC VASCULAR SURGERY)

## 2020-01-06 PROCEDURE — 36556 PR INSERT NON-TUNNEL CV CATH 5+ YRS OLD: ICD-10-PCS | Mod: 59,,, | Performed by: ANESTHESIOLOGY

## 2020-01-06 PROCEDURE — D9220A PRA ANESTHESIA: Mod: ,,, | Performed by: ANESTHESIOLOGY

## 2020-01-06 PROCEDURE — 27201037 HC PRESSURE MONITORING SET UP

## 2020-01-06 PROCEDURE — D9220A PRA ANESTHESIA: ICD-10-PCS | Mod: ,,, | Performed by: ANESTHESIOLOGY

## 2020-01-06 PROCEDURE — C9113 INJ PANTOPRAZOLE SODIUM, VIA: HCPCS | Performed by: NURSE PRACTITIONER

## 2020-01-06 PROCEDURE — 85730 THROMBOPLASTIN TIME PARTIAL: CPT

## 2020-01-06 PROCEDURE — 99291 PR CRITICAL CARE, E/M 30-74 MINUTES: ICD-10-PCS | Mod: ,,, | Performed by: SURGERY

## 2020-01-06 PROCEDURE — 99900035 HC TECH TIME PER 15 MIN (STAT)

## 2020-01-06 PROCEDURE — 36556 INSERT NON-TUNNEL CV CATH: CPT | Mod: 59,,, | Performed by: ANESTHESIOLOGY

## 2020-01-06 PROCEDURE — 25000003 PHARM REV CODE 250: Performed by: THORACIC SURGERY (CARDIOTHORACIC VASCULAR SURGERY)

## 2020-01-06 PROCEDURE — 94761 N-INVAS EAR/PLS OXIMETRY MLT: CPT

## 2020-01-06 PROCEDURE — 85610 PROTHROMBIN TIME: CPT

## 2020-01-06 PROCEDURE — L8670 VASCULAR GRAFT, SYNTHETIC: HCPCS | Performed by: THORACIC SURGERY (CARDIOTHORACIC VASCULAR SURGERY)

## 2020-01-06 PROCEDURE — 88305 TISSUE EXAM BY PATHOLOGIST: ICD-10-PCS | Mod: 26,,, | Performed by: PATHOLOGY

## 2020-01-06 PROCEDURE — 93010 EKG 12-LEAD: ICD-10-PCS | Mod: ,,, | Performed by: INTERNAL MEDICINE

## 2020-01-06 PROCEDURE — 27100088 HC CELL SAVER

## 2020-01-06 PROCEDURE — P9045 ALBUMIN (HUMAN), 5%, 250 ML: HCPCS | Mod: JG | Performed by: STUDENT IN AN ORGANIZED HEALTH CARE EDUCATION/TRAINING PROGRAM

## 2020-01-06 PROCEDURE — 93312 PR ECHO HEART,TRANSESOPHAGEAL: ICD-10-PCS | Mod: 26,59,, | Performed by: ANESTHESIOLOGY

## 2020-01-06 DEVICE — GRAFT 28 X 30 GELWEAVE WOVEN: Type: IMPLANTABLE DEVICE | Site: CHEST | Status: FUNCTIONAL

## 2020-01-06 DEVICE — FELT TEFLON 1INX6IN: Type: IMPLANTABLE DEVICE | Site: CHEST | Status: FUNCTIONAL

## 2020-01-06 RX ORDER — ONDANSETRON 2 MG/ML
4 INJECTION INTRAMUSCULAR; INTRAVENOUS EVERY 12 HOURS PRN
Status: DISCONTINUED | OUTPATIENT
Start: 2020-01-06 | End: 2020-01-10

## 2020-01-06 RX ORDER — OXYCODONE HYDROCHLORIDE 5 MG/1
10 TABLET ORAL EVERY 4 HOURS PRN
Status: DISCONTINUED | OUTPATIENT
Start: 2020-01-06 | End: 2020-01-10

## 2020-01-06 RX ORDER — POTASSIUM CHLORIDE 14.9 MG/ML
20 INJECTION INTRAVENOUS
Status: DISCONTINUED | OUTPATIENT
Start: 2020-01-06 | End: 2020-01-08

## 2020-01-06 RX ORDER — POTASSIUM CHLORIDE 29.8 MG/ML
40 INJECTION INTRAVENOUS
Status: DISCONTINUED | OUTPATIENT
Start: 2020-01-06 | End: 2020-01-08

## 2020-01-06 RX ORDER — LIDOCAINE HYDROCHLORIDE 10 MG/ML
1 INJECTION, SOLUTION EPIDURAL; INFILTRATION; INTRACAUDAL; PERINEURAL
Status: COMPLETED | OUTPATIENT
Start: 2020-01-06 | End: 2020-01-06

## 2020-01-06 RX ORDER — DOCUSATE SODIUM 100 MG/1
100 CAPSULE, LIQUID FILLED ORAL 2 TIMES DAILY
Status: DISCONTINUED | OUTPATIENT
Start: 2020-01-06 | End: 2020-01-10 | Stop reason: HOSPADM

## 2020-01-06 RX ORDER — ASPIRIN 300 MG/1
300 SUPPOSITORY RECTAL ONCE AS NEEDED
Status: DISCONTINUED | OUTPATIENT
Start: 2020-01-06 | End: 2020-01-10

## 2020-01-06 RX ORDER — MUPIROCIN 20 MG/G
1 OINTMENT TOPICAL 2 TIMES DAILY
Status: DISCONTINUED | OUTPATIENT
Start: 2020-01-06 | End: 2020-01-10 | Stop reason: HOSPADM

## 2020-01-06 RX ORDER — NICARDIPINE HYDROCHLORIDE 0.2 MG/ML
2.5 INJECTION INTRAVENOUS CONTINUOUS
Status: DISCONTINUED | OUTPATIENT
Start: 2020-01-06 | End: 2020-01-08

## 2020-01-06 RX ORDER — MAGNESIUM SULFATE HEPTAHYDRATE 40 MG/ML
4 INJECTION, SOLUTION INTRAVENOUS
Status: DISCONTINUED | OUTPATIENT
Start: 2020-01-06 | End: 2020-01-08

## 2020-01-06 RX ORDER — ALBUMIN HUMAN 50 G/1000ML
SOLUTION INTRAVENOUS
Status: DISPENSED
Start: 2020-01-06 | End: 2020-01-07

## 2020-01-06 RX ORDER — KETAMINE HCL IN 0.9 % NACL 50 MG/5 ML
SYRINGE (ML) INTRAVENOUS
Status: DISCONTINUED | OUTPATIENT
Start: 2020-01-06 | End: 2020-01-06

## 2020-01-06 RX ORDER — ALBUMIN HUMAN 50 G/1000ML
12.5 SOLUTION INTRAVENOUS ONCE
Status: COMPLETED | OUTPATIENT
Start: 2020-01-07 | End: 2020-01-06

## 2020-01-06 RX ORDER — TRANEXAMIC ACID 100 MG/ML
INJECTION, SOLUTION INTRAVENOUS CONTINUOUS PRN
Status: DISCONTINUED | OUTPATIENT
Start: 2020-01-06 | End: 2020-01-06

## 2020-01-06 RX ORDER — ALBUMIN HUMAN 50 G/1000ML
12.5 SOLUTION INTRAVENOUS ONCE
Status: COMPLETED | OUTPATIENT
Start: 2020-01-06 | End: 2020-01-06

## 2020-01-06 RX ORDER — POLYETHYLENE GLYCOL 3350 17 G/17G
17 POWDER, FOR SOLUTION ORAL DAILY
Status: DISCONTINUED | OUTPATIENT
Start: 2020-01-06 | End: 2020-01-10 | Stop reason: HOSPADM

## 2020-01-06 RX ORDER — DEXMEDETOMIDINE HYDROCHLORIDE 100 UG/ML
INJECTION, SOLUTION INTRAVENOUS
Status: DISCONTINUED | OUTPATIENT
Start: 2020-01-06 | End: 2020-01-06

## 2020-01-06 RX ORDER — LIDOCAINE HYDROCHLORIDE 20 MG/ML
INJECTION, SOLUTION EPIDURAL; INFILTRATION; INTRACAUDAL; PERINEURAL
Status: DISCONTINUED | OUTPATIENT
Start: 2020-01-06 | End: 2020-01-06

## 2020-01-06 RX ORDER — FENTANYL CITRATE 50 UG/ML
25 INJECTION, SOLUTION INTRAMUSCULAR; INTRAVENOUS
Status: DISCONTINUED | OUTPATIENT
Start: 2020-01-06 | End: 2020-01-07

## 2020-01-06 RX ORDER — ACETAMINOPHEN 325 MG/1
650 TABLET ORAL EVERY 4 HOURS PRN
Status: DISCONTINUED | OUTPATIENT
Start: 2020-01-06 | End: 2020-01-09

## 2020-01-06 RX ORDER — PROPOFOL 10 MG/ML
VIAL (ML) INTRAVENOUS
Status: DISCONTINUED | OUTPATIENT
Start: 2020-01-06 | End: 2020-01-06

## 2020-01-06 RX ORDER — CEFAZOLIN SODIUM 1 G/3ML
2 INJECTION, POWDER, FOR SOLUTION INTRAMUSCULAR; INTRAVENOUS
Status: COMPLETED | OUTPATIENT
Start: 2020-01-06 | End: 2020-01-06

## 2020-01-06 RX ORDER — IPRATROPIUM BROMIDE AND ALBUTEROL SULFATE 2.5; .5 MG/3ML; MG/3ML
3 SOLUTION RESPIRATORY (INHALATION) EVERY 4 HOURS
Status: COMPLETED | OUTPATIENT
Start: 2020-01-06 | End: 2020-01-07

## 2020-01-06 RX ORDER — NOREPINEPHRINE BITARTRATE 1 MG/ML
INJECTION, SOLUTION INTRAVENOUS
Status: DISCONTINUED | OUTPATIENT
Start: 2020-01-06 | End: 2020-01-06

## 2020-01-06 RX ORDER — GLUCAGON 1 MG
1 KIT INJECTION
Status: DISCONTINUED | OUTPATIENT
Start: 2020-01-06 | End: 2020-01-08

## 2020-01-06 RX ORDER — IPRATROPIUM BROMIDE AND ALBUTEROL SULFATE 2.5; .5 MG/3ML; MG/3ML
3 SOLUTION RESPIRATORY (INHALATION) EVERY 4 HOURS PRN
Status: ACTIVE | OUTPATIENT
Start: 2020-01-06 | End: 2020-01-07

## 2020-01-06 RX ORDER — SODIUM CHLORIDE 9 MG/ML
INJECTION, SOLUTION INTRAVENOUS CONTINUOUS PRN
Status: DISCONTINUED | OUTPATIENT
Start: 2020-01-06 | End: 2020-01-06

## 2020-01-06 RX ORDER — HEPARIN SODIUM 1000 [USP'U]/ML
INJECTION, SOLUTION INTRAVENOUS; SUBCUTANEOUS
Status: DISCONTINUED | OUTPATIENT
Start: 2020-01-06 | End: 2020-01-06

## 2020-01-06 RX ORDER — EPINEPHRINE 1 MG/ML
INJECTION, SOLUTION INTRACARDIAC; INTRAMUSCULAR; INTRAVENOUS; SUBCUTANEOUS
Status: DISCONTINUED
Start: 2020-01-06 | End: 2020-01-06 | Stop reason: WASHOUT

## 2020-01-06 RX ORDER — PROTAMINE SULFATE 10 MG/ML
INJECTION, SOLUTION INTRAVENOUS
Status: DISCONTINUED | OUTPATIENT
Start: 2020-01-06 | End: 2020-01-06

## 2020-01-06 RX ORDER — ACETAMINOPHEN 10 MG/ML
INJECTION, SOLUTION INTRAVENOUS
Status: DISCONTINUED | OUTPATIENT
Start: 2020-01-06 | End: 2020-01-06

## 2020-01-06 RX ORDER — POTASSIUM CHLORIDE 14.9 MG/ML
60 INJECTION INTRAVENOUS
Status: DISCONTINUED | OUTPATIENT
Start: 2020-01-06 | End: 2020-01-08

## 2020-01-06 RX ORDER — SODIUM CHLORIDE 0.9 % (FLUSH) 0.9 %
10 SYRINGE (ML) INJECTION
Status: DISCONTINUED | OUTPATIENT
Start: 2020-01-06 | End: 2020-01-10

## 2020-01-06 RX ORDER — ROCURONIUM BROMIDE 10 MG/ML
INJECTION, SOLUTION INTRAVENOUS
Status: DISCONTINUED | OUTPATIENT
Start: 2020-01-06 | End: 2020-01-06

## 2020-01-06 RX ORDER — PHENYLEPHRINE HYDROCHLORIDE 10 MG/ML
INJECTION INTRAVENOUS
Status: DISCONTINUED | OUTPATIENT
Start: 2020-01-06 | End: 2020-01-06

## 2020-01-06 RX ORDER — CEFAZOLIN SODIUM 1 G/3ML
2 INJECTION, POWDER, FOR SOLUTION INTRAMUSCULAR; INTRAVENOUS
Status: COMPLETED | OUTPATIENT
Start: 2020-01-06 | End: 2020-01-07

## 2020-01-06 RX ORDER — IBUPROFEN 200 MG
16 TABLET ORAL
Status: DISCONTINUED | OUTPATIENT
Start: 2020-01-06 | End: 2020-01-08

## 2020-01-06 RX ORDER — ONDANSETRON 2 MG/ML
INJECTION INTRAMUSCULAR; INTRAVENOUS
Status: DISCONTINUED | OUTPATIENT
Start: 2020-01-06 | End: 2020-01-06

## 2020-01-06 RX ORDER — SODIUM CHLORIDE 9 MG/ML
INJECTION, SOLUTION INTRAVENOUS CONTINUOUS
Status: DISCONTINUED | OUTPATIENT
Start: 2020-01-06 | End: 2020-01-06

## 2020-01-06 RX ORDER — MAGNESIUM SULFATE HEPTAHYDRATE 40 MG/ML
2 INJECTION, SOLUTION INTRAVENOUS
Status: DISCONTINUED | OUTPATIENT
Start: 2020-01-06 | End: 2020-01-08

## 2020-01-06 RX ORDER — OXYCODONE HYDROCHLORIDE 5 MG/1
5 TABLET ORAL EVERY 4 HOURS PRN
Status: DISCONTINUED | OUTPATIENT
Start: 2020-01-06 | End: 2020-01-10 | Stop reason: HOSPADM

## 2020-01-06 RX ORDER — METOCLOPRAMIDE HYDROCHLORIDE 5 MG/ML
5 INJECTION INTRAMUSCULAR; INTRAVENOUS EVERY 6 HOURS PRN
Status: DISCONTINUED | OUTPATIENT
Start: 2020-01-06 | End: 2020-01-10

## 2020-01-06 RX ORDER — BISACODYL 10 MG
10 SUPPOSITORY, RECTAL RECTAL EVERY 12 HOURS PRN
Status: DISCONTINUED | OUTPATIENT
Start: 2020-01-06 | End: 2020-01-10

## 2020-01-06 RX ORDER — MIDAZOLAM HYDROCHLORIDE 1 MG/ML
INJECTION, SOLUTION INTRAMUSCULAR; INTRAVENOUS
Status: DISCONTINUED | OUTPATIENT
Start: 2020-01-06 | End: 2020-01-06

## 2020-01-06 RX ORDER — MUPIROCIN 20 MG/G
1 OINTMENT TOPICAL
Status: COMPLETED | OUTPATIENT
Start: 2020-01-06 | End: 2020-01-06

## 2020-01-06 RX ORDER — TRANEXAMIC ACID 100 MG/ML
INJECTION, SOLUTION INTRAVENOUS
Status: DISCONTINUED | OUTPATIENT
Start: 2020-01-06 | End: 2020-01-06

## 2020-01-06 RX ORDER — ASPIRIN 325 MG
325 TABLET ORAL ONCE
Status: DISCONTINUED | OUTPATIENT
Start: 2020-01-06 | End: 2020-01-08

## 2020-01-06 RX ORDER — METOPROLOL TARTRATE 25 MG/1
25 TABLET, FILM COATED ORAL
Status: COMPLETED | OUTPATIENT
Start: 2020-01-06 | End: 2020-01-06

## 2020-01-06 RX ORDER — FENTANYL CITRATE 50 UG/ML
INJECTION, SOLUTION INTRAMUSCULAR; INTRAVENOUS
Status: DISCONTINUED | OUTPATIENT
Start: 2020-01-06 | End: 2020-01-06

## 2020-01-06 RX ORDER — FENTANYL CITRATE 50 UG/ML
50 INJECTION, SOLUTION INTRAMUSCULAR; INTRAVENOUS
Status: DISCONTINUED | OUTPATIENT
Start: 2020-01-11 | End: 2020-01-07

## 2020-01-06 RX ORDER — PANTOPRAZOLE SODIUM 40 MG/10ML
40 INJECTION, POWDER, LYOPHILIZED, FOR SOLUTION INTRAVENOUS DAILY
Status: DISCONTINUED | OUTPATIENT
Start: 2020-01-06 | End: 2020-01-07

## 2020-01-06 RX ORDER — BACITRACIN 50000 [IU]/1
INJECTION, POWDER, FOR SOLUTION INTRAMUSCULAR
Status: DISCONTINUED | OUTPATIENT
Start: 2020-01-06 | End: 2020-01-06 | Stop reason: HOSPADM

## 2020-01-06 RX ORDER — ASPIRIN 325 MG
325 TABLET ORAL DAILY
Status: DISCONTINUED | OUTPATIENT
Start: 2020-01-06 | End: 2020-01-10 | Stop reason: HOSPADM

## 2020-01-06 RX ORDER — FENTANYL CITRATE 50 UG/ML
25 INJECTION, SOLUTION INTRAMUSCULAR; INTRAVENOUS
Status: DISCONTINUED | OUTPATIENT
Start: 2020-01-06 | End: 2020-01-06

## 2020-01-06 RX ORDER — IBUPROFEN 200 MG
24 TABLET ORAL
Status: DISCONTINUED | OUTPATIENT
Start: 2020-01-06 | End: 2020-01-08

## 2020-01-06 RX ORDER — DEXTROSE MONOHYDRATE, SODIUM CHLORIDE, AND POTASSIUM CHLORIDE 50; 1.49; 4.5 G/1000ML; G/1000ML; G/1000ML
INJECTION, SOLUTION INTRAVENOUS CONTINUOUS
Status: DISCONTINUED | OUTPATIENT
Start: 2020-01-06 | End: 2020-01-08

## 2020-01-06 RX ORDER — INSULIN ASPART 100 [IU]/ML
1-10 INJECTION, SOLUTION INTRAVENOUS; SUBCUTANEOUS
Status: DISCONTINUED | OUTPATIENT
Start: 2020-01-06 | End: 2020-01-08

## 2020-01-06 RX ADMIN — PROTAMINE SULFATE 250 MG: 10 INJECTION, SOLUTION INTRAVENOUS at 11:01

## 2020-01-06 RX ADMIN — NICARDIPINE HYDROCHLORIDE 2.5 MG/HR: 0.2 INJECTION, SOLUTION INTRAVENOUS at 01:01

## 2020-01-06 RX ADMIN — SODIUM CHLORIDE, SODIUM GLUCONATE, SODIUM ACETATE, POTASSIUM CHLORIDE, MAGNESIUM CHLORIDE, SODIUM PHOSPHATE, DIBASIC, AND POTASSIUM PHOSPHATE: .53; .5; .37; .037; .03; .012; .00082 INJECTION, SOLUTION INTRAVENOUS at 07:01

## 2020-01-06 RX ADMIN — PHENYLEPHRINE HYDROCHLORIDE 100 MCG: 10 INJECTION INTRAVENOUS at 07:01

## 2020-01-06 RX ADMIN — OXYCODONE HYDROCHLORIDE 5 MG: 5 TABLET ORAL at 04:01

## 2020-01-06 RX ADMIN — Medication 50 MG: at 08:01

## 2020-01-06 RX ADMIN — SODIUM CHLORIDE: 0.9 INJECTION, SOLUTION INTRAVENOUS at 07:01

## 2020-01-06 RX ADMIN — NICARDIPINE HYDROCHLORIDE 1 MG/HR: 0.2 INJECTION, SOLUTION INTRAVENOUS at 08:01

## 2020-01-06 RX ADMIN — HEPARIN SODIUM 10000 UNITS: 1000 INJECTION, SOLUTION INTRAVENOUS; SUBCUTANEOUS at 09:01

## 2020-01-06 RX ADMIN — ALBUMIN (HUMAN) 12.5 G: 12.5 SOLUTION INTRAVENOUS at 11:01

## 2020-01-06 RX ADMIN — PANTOPRAZOLE SODIUM 40 MG: 40 INJECTION, POWDER, FOR SOLUTION INTRAVENOUS at 02:01

## 2020-01-06 RX ADMIN — DOCUSATE SODIUM 100 MG: 100 CAPSULE, LIQUID FILLED ORAL at 02:01

## 2020-01-06 RX ADMIN — EPINEPHRINE 0.1 MCG/KG/MIN: 1 INJECTION INTRAMUSCULAR; INTRAVENOUS; SUBCUTANEOUS at 10:01

## 2020-01-06 RX ADMIN — NOREPINEPHRINE BITARTRATE 8 MCG: 1 INJECTION, SOLUTION, CONCENTRATE INTRAVENOUS at 09:01

## 2020-01-06 RX ADMIN — CALCIUM CHLORIDE 1000 MG: 100 INJECTION, SOLUTION INTRAVENOUS at 11:01

## 2020-01-06 RX ADMIN — DEXMEDETOMIDINE HYDROCHLORIDE 5 MCG: 100 INJECTION, SOLUTION, CONCENTRATE INTRAVENOUS at 09:01

## 2020-01-06 RX ADMIN — MUPIROCIN 1 G: 20 OINTMENT TOPICAL at 08:01

## 2020-01-06 RX ADMIN — NOREPINEPHRINE BITARTRATE 0.02 MCG/KG/MIN: 1 INJECTION, SOLUTION, CONCENTRATE INTRAVENOUS at 08:01

## 2020-01-06 RX ADMIN — DEXMEDETOMIDINE HYDROCHLORIDE 5 MCG: 100 INJECTION, SOLUTION, CONCENTRATE INTRAVENOUS at 08:01

## 2020-01-06 RX ADMIN — IPRATROPIUM BROMIDE AND ALBUTEROL SULFATE 3 ML: .5; 3 SOLUTION RESPIRATORY (INHALATION) at 04:01

## 2020-01-06 RX ADMIN — CEFAZOLIN 2 G: 1 INJECTION, POWDER, FOR SOLUTION INTRAMUSCULAR; INTRAVENOUS at 08:01

## 2020-01-06 RX ADMIN — HEPARIN SODIUM 27000 UNITS: 1000 INJECTION, SOLUTION INTRAVENOUS; SUBCUTANEOUS at 09:01

## 2020-01-06 RX ADMIN — PHENYLEPHRINE HYDROCHLORIDE 200 MCG: 10 INJECTION INTRAVENOUS at 09:01

## 2020-01-06 RX ADMIN — IPRATROPIUM BROMIDE AND ALBUTEROL SULFATE 3 ML: .5; 3 SOLUTION RESPIRATORY (INHALATION) at 08:01

## 2020-01-06 RX ADMIN — LIDOCAINE HYDROCHLORIDE 100 MG: 20 INJECTION, SOLUTION EPIDURAL; INFILTRATION; INTRACAUDAL; PERINEURAL at 07:01

## 2020-01-06 RX ADMIN — FENTANYL CITRATE 100 MCG: 50 INJECTION, SOLUTION INTRAMUSCULAR; INTRAVENOUS at 07:01

## 2020-01-06 RX ADMIN — TRANEXAMIC ACID 1 MG: 100 INJECTION, SOLUTION INTRAVENOUS at 09:01

## 2020-01-06 RX ADMIN — CEFAZOLIN 2 G: 1 INJECTION, POWDER, FOR SOLUTION INTRAMUSCULAR; INTRAVENOUS at 01:01

## 2020-01-06 RX ADMIN — MUPIROCIN 1 G: 20 OINTMENT TOPICAL at 02:01

## 2020-01-06 RX ADMIN — PHENYLEPHRINE HYDROCHLORIDE 100 MCG: 10 INJECTION INTRAVENOUS at 08:01

## 2020-01-06 RX ADMIN — LIDOCAINE HYDROCHLORIDE 10 MG: 10 INJECTION, SOLUTION EPIDURAL; INFILTRATION; INTRACAUDAL; PERINEURAL at 05:01

## 2020-01-06 RX ADMIN — NICARDIPINE HYDROCHLORIDE 5 MG/HR: 0.2 INJECTION, SOLUTION INTRAVENOUS at 03:01

## 2020-01-06 RX ADMIN — METOPROLOL TARTRATE 25 MG: 25 TABLET ORAL at 05:01

## 2020-01-06 RX ADMIN — DOCUSATE SODIUM 100 MG: 100 CAPSULE, LIQUID FILLED ORAL at 08:01

## 2020-01-06 RX ADMIN — ALBUMIN (HUMAN) 12.5 G: 12.5 SOLUTION INTRAVENOUS at 10:01

## 2020-01-06 RX ADMIN — CEFAZOLIN 2 G: 330 INJECTION, POWDER, FOR SOLUTION INTRAMUSCULAR; INTRAVENOUS at 08:01

## 2020-01-06 RX ADMIN — POLYETHYLENE GLYCOL 3350 17 G: 17 POWDER, FOR SOLUTION ORAL at 02:01

## 2020-01-06 RX ADMIN — MIDAZOLAM HYDROCHLORIDE 2 MG: 1 INJECTION, SOLUTION INTRAMUSCULAR; INTRAVENOUS at 07:01

## 2020-01-06 RX ADMIN — ACETAMINOPHEN 650 MG: 325 TABLET ORAL at 07:01

## 2020-01-06 RX ADMIN — TRANEXAMIC ACID 1 MG/KG/HR: 100 INJECTION, SOLUTION INTRAVENOUS at 07:01

## 2020-01-06 RX ADMIN — ACETAMINOPHEN 1000 MG: 10 INJECTION, SOLUTION INTRAVENOUS at 08:01

## 2020-01-06 RX ADMIN — ROCURONIUM BROMIDE 30 MG: 10 INJECTION, SOLUTION INTRAVENOUS at 08:01

## 2020-01-06 RX ADMIN — ASPIRIN 325 MG ORAL TABLET 325 MG: 325 PILL ORAL at 02:01

## 2020-01-06 RX ADMIN — IPRATROPIUM BROMIDE AND ALBUTEROL SULFATE 3 ML: .5; 3 SOLUTION RESPIRATORY (INHALATION) at 01:01

## 2020-01-06 RX ADMIN — ROCURONIUM BROMIDE 50 MG: 10 INJECTION, SOLUTION INTRAVENOUS at 07:01

## 2020-01-06 RX ADMIN — SUGAMMADEX 200 MG: 100 INJECTION, SOLUTION INTRAVENOUS at 11:01

## 2020-01-06 RX ADMIN — MUPIROCIN 1 G: 20 OINTMENT TOPICAL at 05:01

## 2020-01-06 RX ADMIN — PROPOFOL 200 MG: 10 INJECTION, EMULSION INTRAVENOUS at 07:01

## 2020-01-06 RX ADMIN — METHADONE HYDROCHLORIDE 17.96 MG: 10 INJECTION, SOLUTION INTRAMUSCULAR; INTRAVENOUS; SUBCUTANEOUS at 09:01

## 2020-01-06 RX ADMIN — DEXTROSE MONOHYDRATE, SODIUM CHLORIDE, AND POTASSIUM CHLORIDE: 50; 4.5; 1.49 INJECTION, SOLUTION INTRAVENOUS at 02:01

## 2020-01-06 RX ADMIN — OXYCODONE HYDROCHLORIDE 10 MG: 10 TABLET ORAL at 08:01

## 2020-01-06 RX ADMIN — SODIUM CHLORIDE: 0.9 INJECTION, SOLUTION INTRAVENOUS at 05:01

## 2020-01-06 NOTE — ANESTHESIA PROCEDURE NOTES
MARIANNE    Diagnosis: Ascending aortic aneurysm  Patient location during procedure: OR  Procedure start time: 1/6/2020 11:20 AM  Timeout: 1/6/2020 11:20 AM  Procedure end time: 1/6/2020 11:40 AM  Surgery related to: ascending aortic replacement  Exam type: Final  Staffing  Anesthesiologist: Silvio Cantor Jr., MD  Performed: anesthesiologist   Preanesthetic Checklist  Completed: patient identified, surgical consent, pre-op evaluation, timeout performed, risks and benefits discussed, monitors and equipment checked, anesthesia consent given, oxygen available, suction available, hand hygiene performed and patient being monitored  Setup & Induction  Patient preparation: bite block inserted  Probe Insertion: easy  Exam: complete      Findings  Impression  Other Findings  Normal biventricular function.    Pinpoint  perivalvular leak noted which resolved after protamine administration.   Probe Removal      Exam     Left Heart  Left Atruim: normal    Left Ventricle: cm, normal (men 4.2-5.9; women 3.8-5.2)  LV Wall Thickness (posterior wall): cm, normal (men 0.6-1.0 cm; women 0.6-0.9 cm)    LVAD  Estimated Ejection Fraction: > 55% normal  Regional Wall Abnormalities: no RWMA            Right Heart  Right Ventricle: dilated  Right Ventricle Function: normal  Right Atria: cm and normal    Intra Atrial Septum  PFO: no shunt by color flow doppler  no IAS aneurysm  lipomatous hypertrophy  no Atrial Septal Defect (Asd)    Right Ventricle  Size: dilated, Free Wall Thickness: normal    Aortic Valve:  Stenosis: none  Morphology: bicuspid aortic valve  Regurgitation: no aortic valve regurgitation     Mitral Valve:  Morphology:normal  Jet Description: none    Tricuspid Valve:  Morphology: normal  Regurgitation: mild    Pulmonic Valve:  Morphology:normal  Regurgitation(color flow): none    Great Vessels  Ascending Aorta Atherosclerosis: 1=nl-min dz  Aortic Arch Atherosclerosis: 1=nl-min dz  Descending Aorta Atherosclerosis: 1=nl-min  dz      Effusions  Effusions: none    Summary  Findings discussed with surgeon.    Other Findings   Normal biventricular function.    Pinpoint  perivalvular leak noted which resolved after protamine administration.

## 2020-01-06 NOTE — ANESTHESIA PROCEDURE NOTES
Arterial    Diagnosis: Ascending aortic aneurysm    Patient location during procedure: done in OR  Procedure start time: 1/6/2020 7:13 AM  Timeout: 1/6/2020 7:12 AM  Procedure end time: 1/6/2020 7:17 AM    Staffing  Authorizing Provider: Silvio Cantor Jr., MD  Performing Provider: Garrison Nolasco MD    Anesthesiologist was present at the time of the procedure.    Preanesthetic Checklist  Completed: patient identified, site marked, surgical consent, pre-op evaluation, timeout performed, IV checked, risks and benefits discussed, monitors and equipment checked and anesthesia consent givenArterial  Skin Prep: chlorhexidine gluconate  Local Infiltration: lidocaine  Orientation: left  Location: radial  Catheter Size: 20 G  Catheter placement by Anatomical landmarks. Heme positive aspiration all ports.Insertion Attempts: 1  Assessment  Dressing: secured with tape and tegaderm  Patient: Tolerated well

## 2020-01-06 NOTE — TRANSFER OF CARE
"Anesthesia Transfer of Care Note    Patient: Guillermo Gee    Procedure(s) Performed: Procedure(s) (LRB):  REPLACEMENT, AORTA, ASCENDING (N/A)    Patient location: ICU    Anesthesia Type: general    Transport from OR: Upon arrival to PACU/ICU, patient attached to ventilator and auscultated to confirm bilateral breath sounds and adequate TV. Continuous ECG monitoring in transport. Continuous SpO2 monitoring in transport. Continuos invasive BP monitoring in transport. Transported from OR on 6-10 L/min O2 by face mask with adequate spontaneous ventilation    Post pain: adequate analgesia    Post assessment: no apparent anesthetic complications and tolerated procedure well    Post vital signs: stable    Level of consciousness: awake and responds to stimulation    Nausea/Vomiting: no nausea/vomiting    Complications: none    Transfer of care protocol was followed      Last vitals:   Visit Vitals  /69 (BP Location: Left arm, Patient Position: Lying)   Pulse (!) 55   Temp 36.6 °C (97.9 °F) (Oral)   Resp 17   Ht 5' 10" (1.778 m)   Wt 89.8 kg (198 lb)   SpO2 96%   BMI 28.41 kg/m²     "

## 2020-01-06 NOTE — BRIEF OP NOTE
[facility]  Cardiothoracic Surgery  Operative Note    SUMMARY     Date of Procedure: 1/6/2020     Procedure: Procedure(s) (LRB):  REPLACEMENT, AORTA, ASCENDING with a 28 mm Gelweave Dacron tube graft [id number]    Surgeon(s) and Role:     * Allen Baez MD - Primary    Assisting Surgeon: None    Pre-Operative Diagnosis: Thoracic aortic aneurysm without rupture [I71.2]    Post-Operative Diagnosis: Post-Op Diagnosis Codes:     * Thoracic aortic aneurysm without rupture [I71.2]    Anesthesia: General        Description of the Findings of the Procedure: Dilated ascending aorta. Normal function of bicuspid valve.         Complications: None    Estimated Blood Loss (EBL): 100 mL           Implants:   Implant Name Type Inv. Item Serial No.  Lot No. LRB No. Used   FELT HOLLY [id number] - [id number]  FELT HOLLY [id number]  C.R. BARD [id number] N/A 1   GRAFT 28 X 30 GELWEAVE WOVEN - [id number]  GRAFT 28 X 30 GELWEAVE WOVEN [id number] [employer] [id number] N/A 1       Specimens:   Specimen (12h ago, onward)    None                  Attestation:  I was present and scrubbed for the entire procedure. There was no qualified resident available to assist in this operation.

## 2020-01-06 NOTE — H&P
[facility]  Critical Care - Surgery  History & Physical    Patient Name: Guillermo Gee  MRN: [id number]  Admission Date: 1/6/2020  Code Status: Full Code  Attending Physician: Allen Baez MD   Primary Care Provider: Polo Osman Jr, MD   Principal Problem: <principal problem not specified>    Subjective:     HPI:  Guillermo Gee is a 59 y.o. male with hyperlipidemia and coronary artery disease and a 5.4 cm ascending aortic aneurysm status post repair on 1/6/2019.   Patient arrived to SICU extubated on no vasopressors.  Currently awake and alert not complaining of pain.  Patient received the following intraop:    2300 Crystalloid  550 cc Cell Saver    Hospital/ICU Course:  No notes on file    Follow-up For: Procedure(s) (LRB):  REPLACEMENT, AORTA, ASCENDING (N/A)    Post-Operative Day: Day of Surgery     Past Medical History:   Diagnosis Date    CAD (coronary artery disease) 12/19/2019    Diverticulosis     Family history of prostate cancer in father     Kidney stones        Past Surgical History:   Procedure Laterality Date    INGUINAL HERNIA REPAIR Left     LEFT HEART CATHETERIZATION Left 12/19/2019    Procedure: CATHETERIZATION, HEART, LEFT;  Surgeon: John Kyle MD;  Location: [facility] CATH LAB;  Service: Cardiology;  Laterality: Left;    SHOULDER ARTHROSCOPY W/ ROTATOR CUFF REPAIR Bilateral 2011, 2012    UMBILICAL HERNIA REPAIR         Review of patient's allergies indicates:   Allergen Reactions    Codeine Itching       Family History     Problem Relation (Age of Onset)    Colon cancer Paternal Uncle    No Known Problems Mother, Sister, Brother, Brother    Prostate cancer Father        Tobacco Use    Smoking status: Current Some Day Smoker     Types: Cigarettes     Start date: 12/27/1981    Smokeless tobacco: Never Used    Tobacco comment: occ cig use: ~ 3/day   Substance and Sexual Activity    Alcohol use: Yes     Alcohol/week: 6.0 - 8.0 standard drinks     Types:  3 - 4 Glasses of wine, 3 - 4 Standard drinks or equivalent per week     Frequency: 2-3 times a week     Drinks per session: 1 or 2     Binge frequency: Less than monthly    Drug use: Never    Sexual activity: Yes     Partners: Female      Review of Systems   Unable to perform ROS: Acuity of condition     Objective:     Vital Signs (Most Recent):  Temp: (P) 98 °F (36.7 °C) (01/06/20 1230)  Pulse: 66 (01/06/20 1315)  Resp: (!) 22 (01/06/20 1315)  BP: 107/69 (01/06/20 0527)  SpO2: 98 % (01/06/20 1315) Vital Signs (24h Range):  Temp:  [97.9 °F (36.6 °C)-98 °F (36.7 °C)] (P) 98 °F (36.7 °C)  Pulse:  [55-68] 66  Resp:  [14-24] 22  SpO2:  [96 %-100 %] 98 %  BP: (107)/(69) 107/69     Weight: 89.8 kg (198 lb)  Body mass index is 28.41 kg/m².      Intake/Output Summary (Last 24 hours) at 1/6/2020 1328  Last data filed at 1/6/2020 1230  Gross per 24 hour   Intake 2850 ml   Output 1130 ml   Net 1720 ml       Physical Exam   Constitutional: He appears well-developed.   HENT:   Head: Normocephalic.   Eyes: Pupils are equal, round, and reactive to light.   Neck:   RIJ quad lumen   Cardiovascular: Normal rate and regular rhythm.   Mediastinal drains x2, Pleural drain x1 in place to suction.  Sternotomy dressed with dressing C/D/I   Pulmonary/Chest: Effort normal and breath sounds normal. No respiratory distress.   Abdominal: Soft. He exhibits no distension.   Genitourinary:   Genitourinary Comments: Johnson Catheter in place   Musculoskeletal: Normal range of motion.   Neurological: He is alert.   Skin: Skin is warm and dry.   Nursing note and vitals reviewed.      Vents:       Lines/Drains/Airways     Central Venous Catheter Line                 Percutaneous Central Line Insertion/Assessment - Quad lumen  01/06/20 0735 less than 1 day         Percutaneous Central Line Insertion/Assessment - quad lumen  01/06/20 0735 less than 1 day          Drain                 Urethral Catheter 01/06/20 0730 Non-latex 16 Fr. less than 1 day          Y Chest Tube 1 and 2 01/06/20 1140 1 Anterior Mediastinal 19 Fr. 2 Anterior Mediastinal 19 Fr. less than 1 day          Arterial Line                 Arterial Line 01/06/20 0713 Left Radial less than 1 day          Line                 Pacer Wires 01/06/20 1135 less than 1 day          Peripheral Intravenous Line                 Peripheral IV - Single Lumen 01/06/20 0730 18 G Right Hand less than 1 day                Significant Labs:    CBC/Anemia Profile:  Recent Labs   Lab 01/06/20  1020 01/06/20  1228 01/06/20  1238   WBC  --  14.40*  --    HGB  --  12.3*  --    HCT 32* 37.4* 34*   PLT  --  147*  --    MCV  --  91  --    RDW  --  13.0  --         Chemistries:  Recent Labs   Lab 01/06/20  1228      K 4.6      CO2 24   BUN 9   CREATININE 0.9   CALCIUM 7.7*   MG 2.3   PHOS 3.4       All pertinent labs within the past 24 hours have been reviewed.    Significant Imaging:  CXR:   One view: Central line mid SVC.  There is cardiomegaly.  There is mild perihilar atelectasis.  There is postoperative change.    Assessment/Plan:     Ascending aortic aneurysm  59 y.o. male w/ a significant PMHx of hyperlipidemia, coronary artery disease, bicuspid aortic valve and thoracic aortic aneurysm Day of Surgery post op from replacement of ascending aorta.  Patient arrived to SICU extubated off all vasopressor support.     Neuro:   - Oxycodone as needed for pain  - Multimodal pain regimen    Pulmonary:   - Extubated  - Supplemental oxygen as needed  - ABGs PRN  - BTs, DuoNebs    Cardiac:   - Patient with normal biventricular function on intraoperative MARIANNE  - Hemodynamically stable off of vasopressors  - MAP goals of 60 to 80 with SBP < 110 mmgh  - Cardene as needed hemodynamic goals  - Monitor chest tube output  - ASA  - Anticoagulation per CTS    Renal:    - Monitor UOP  - Trend BUN/Cr    ID:   - Afebrile  - No leukocytosis  - Trend WBC daily  - Complete perioperative antibiotics    Hem/Onc:   - H/H stable  - Transfuse  per CTS    Endocrine:    - Insulin gtt for glucose control  - Endocrine consulted, appreciate assistance    Fluids/Electrolytes/Nutrition/GI:   - Replace electrolytes PRN  - mIVF per CTS  - Currently NPO, diet per CTS    PPx:  - DVT: SCD  - Bowel: Miralax, Colace  - GI: Protonix      DISPO:  - Continue SICU care              Critical care was time spent personally by me on the following activities: development of treatment plan with patient or surrogate and bedside caregivers, discussions with consultants, evaluation of patient's response to treatment, examination of patient, ordering and performing treatments and interventions, ordering and review of laboratory studies, ordering and review of radiographic studies, pulse oximetry, re-evaluation of patient's condition.  This critical care time did not overlap with that of any other provider or involve time for any procedures.     Keenan Edge MD  Critical Care - Surgery  [facility]

## 2020-01-06 NOTE — ANESTHESIA PROCEDURE NOTES
Intubation  Performed by: Michaela Flynn MD  Authorized by: Silvio Cantor Jr., MD     Intubation:     Induction:  Intravenous    Intubated:  Postinduction    Mask Ventilation:  Easy with oral airway    Attempts:  1    Attempted By:  Resident anesthesiologist    Method of Intubation:  Video laryngoscopy    Blade:  Glidescope 3    Laryngeal View Grade: Grade I - full view of chords      Difficult Airway Encountered?: No      Complications:  None    Airway Device:  Oral endotracheal tube    Airway Device Size:  7.0    Style/Cuff Inflation:  Cuffed (inflated to minimal occlusive pressure)    Inflation Amount (mL):  6    Tube secured:  22    Secured at:  The lips    Placement Verified By:  Capnometry and other (see comments) (direct visualization with glidescope)    Complicating Factors:  None    Findings Post-Intubation:  BS equal bilateral and atraumatic/condition of teeth unchanged

## 2020-01-06 NOTE — CONSULTS
[facility]  Endocrinology  Diabetes Consult Note    Consult Requested by: Allen Baez MD   Reason for admit: Ascending aortic aneurysm    HISTORY OF PRESENT ILLNESS:  Reason for Consult: Management of Hyperglycemia     Surgical Procedure and Date: ascending aorta replacement 1/6/20    HPI:  Patient is a 59 y.o. male with a diagnosis of CAD, pre-diabetes, and ascending aortic aneurysm. Patient is s/p ascending aorta replacement. Patient reports being pre-Dm for years with no change. Endocrinology consulted for BG management.             Interval HPI:   Admitted to ICU. BG well controlled without insulin.   Eating:   Sips of water   Nausea: No  Hypoglycemia and intervention: No  Fever: No  TPN and/or TF: No    PMH, PSH, FH, SH  reviewed       Review of Systems   Constitutional: Negative for weight changes.  Eyes: Negative for visual disturbance.  Respiratory: Negative for cough.   Cardiovascular: Negative for chest pain.  Gastrointestinal: Negative for nausea.  Endocrine: Negative for polyuria, polydipsia.  Musculoskeletal: Negative for back pain.  Skin: Negative for rash.  Neurological: Negative for syncope.  Psychiatric/Behavioral: Negative for depression.      Current Medications and/or Treatments Impacting Glycemic Control  Immunotherapy:    Immunosuppressants     None        Steroids:   Hormones (From admission, onward)    None        Pressors:    Autonomic Drugs (From admission, onward)    None        Hyperglycemia/Diabetes Medications:   Antihyperglycemics (From admission, onward)    Start     Stop Route Frequency Ordered    01/06/20 1230  insulin regular (Humulin R) 100 Units in sodium chloride 0.9% 100 mL infusion     Question:  Insulin rate changes (DO NOT MODIFY ANSWER)  Answer:  [email or web address]\[email or web address]\Images\Pharmacy\InsulinInfusions\CTS INSULIN [id number].pdf    -- IV Continuous 01/06/20 1227             PHYSICAL EXAMINATION:  Vitals:    01/06/20 1630   BP:    Pulse: 66   Resp: (!) 9    Temp:      Body mass index is 28.41 kg/m².    Physical Exam   Constitutional: Well developed, well nourished, NAD.  ENT: External ears no masses with nose patent; normal hearing.  Neck: Supple; trachea midline.  Cardiovascular: Normal heart sounds, no LE edema. DP +2 bilaterally.  Lungs: Normal effort; lungs anterior bilaterally clear to auscultation.  Abdomen: Soft, no masses, no hernias.  MS: No clubbing or cyanosis of nails noted; r unable to assess gait.  Skin: No rashes, lesions, or ulcers; no nodules. Mid-sternal incision with dressing; chest tubes.  Psychiatric: Good judgement and insight; normal mood and affect.  Neurological: Cranial nerves are grossly intact.  Foot: nails in good condition, no amputations noted.          Labs Reviewed and Include   Recent Labs   Lab 01/06/20  1228   *   CALCIUM 7.7*      K 4.6   CO2 24      BUN 9   CREATININE 0.9     Lab Results   Component Value Date    WBC 14.40 (H) 01/06/2020    HGB 12.3 (L) 01/06/2020    HCT 36 01/06/2020    MCV 91 01/06/2020     (L) 01/06/2020     No results for input(s): TSH, FREET4 in the last 168 hours.  Lab Results   Component Value Date    HGBA1C 5.7 (H) 01/02/2020       Nutritional status:   Body mass index is 28.41 kg/m².  Lab Results   Component Value Date    ALBUMIN 4.0 01/02/2020    ALBUMIN 4.1 12/19/2019    ALBUMIN 3.8 10/04/2019     No results found for: PREALBUMIN    Estimated Creatinine Clearance: 100.9 mL/min (based on SCr of 0.9 mg/dL).    Accu-Checks  Recent Labs     01/06/20  1234 01/06/20  1408 01/06/20  1513 01/06/20  1609   POCTGLUCOSE 131* 117* 114* 107        ASSESSMENT and PLAN    * Ascending aortic aneurysm  S/p repair.   Optimize BG for surgical wound healing.         Pre-diabetes  BG goal 140-180.     Diet advancing. BG below goal without insulin.   Change to BG monitoring ac/hs/0200 and moderate dose correction scale.           Plan discussed with patient, family, and RN at bedside.     Bethany RAMOS  MILTON Marsh  Endocrinology  [facility]

## 2020-01-06 NOTE — ANESTHESIA PROCEDURE NOTES
MARIANNE    Diagnosis: Ascending Aortic Aneurysm  Patient location during procedure: OR  Procedure start time: 1/6/2020 8:30 AM  Timeout: 1/6/2020 8:30 AM  Procedure end time: 1/6/2020 9:00 AM  Surgery related to: Ascending Ao Replacement  Exam type: Baseline  Staffing  Anesthesiologist: Silvio Cantor Jr., MD  Performed: resident and anesthesiologist   Preanesthetic Checklist  Completed: patient identified, surgical consent, pre-op evaluation, timeout performed, risks and benefits discussed, monitors and equipment checked, anesthesia consent given, oxygen available, suction available, hand hygiene performed and patient being monitored  Setup & Induction  Patient preparation: bite block inserted  Probe Insertion: easy  Exam: complete      Findings  Impression  Other Findings  Normal Biventricular Function  No AI. Functional bicuspid valve. Unable to accurately measure pressure gradient across Aortic valve. However, laminar flow noted in all views.   Probe Removal      Exam     Left Heart  Left Atruim: normal    Left Ventricle: cm, normal (men 4.2-5.9; women 3.8-5.2)  LV Wall Thickness (posterior wall): cm, normal (men 0.6-1.0 cm; women 0.6-0.9 cm)    LVAD:no  Estimated Ejection Fraction: > 55% normal  Regional Wall Abnormalities: no RWMA        Left Ventricle Diastolic Function  E Velocity: 77 cm/s  A Velocity: 33 cm/s  Deceleration time: 153 ms, Normal (150-200)  E/A Ratio: 2.33, normal (1-2)    Right Heart  Right Ventricle: dilated  Right Ventricle Function: normal    Intra Atrial Septum  PFO: no shunt by color flow doppler  no IAS aneurysm  lipomatous hypertrophy  no Atrial Septal Defect (Asd)    Right Ventricle  Size: dilated, Free Wall Thickness: normal    Aortic Valve:  Stenosis: none  Morphology: bicuspid aortic valve  Regurgitation: no aortic valve regurgitation     Mitral Valve:  Morphology:normal  Jet Description: none    Tricuspid Valve:  Morphology: normal  Regurgitation: none    Pulmonic  Valve:  Morphology:normal  Regurgitation(color flow): none    Great Vessels  Ascending Aorta Diameter: 4.6 cm ascending aorta   Ascending Aorta Atherosclerosis: 1=nl-min dz  Aortic Arch Atherosclerosis: 1=nl-min dz  Descending Aorta Atherosclerosis: 1=nl-min dz      Effusions  Effusions: none    Summary  Findings discussed with surgeon.    Other Findings   Normal Biventricular Function  No AI. Functional bicuspid valve. Unable to accurately measure pressure gradient across Aortic valve. However, laminar flow noted in all views.

## 2020-01-06 NOTE — SUBJECTIVE & OBJECTIVE
Interval HPI:   Admitted to ICU. BG well controlled without insulin.   Eating:   Sips of water   Nausea: No  Hypoglycemia and intervention: No  Fever: No  TPN and/or TF: No    PMH, PSH, FH, SH  reviewed       Review of Systems   Constitutional: Negative for weight changes.  Eyes: Negative for visual disturbance.  Respiratory: Negative for cough.   Cardiovascular: Negative for chest pain.  Gastrointestinal: Negative for nausea.  Endocrine: Negative for polyuria, polydipsia.  Musculoskeletal: Negative for back pain.  Skin: Negative for rash.  Neurological: Negative for syncope.  Psychiatric/Behavioral: Negative for depression.      Current Medications and/or Treatments Impacting Glycemic Control  Immunotherapy:    Immunosuppressants     None        Steroids:   Hormones (From admission, onward)    None        Pressors:    Autonomic Drugs (From admission, onward)    None        Hyperglycemia/Diabetes Medications:   Antihyperglycemics (From admission, onward)    Start     Stop Route Frequency Ordered    01/06/20 1230  insulin regular (Humulin R) 100 Units in sodium chloride 0.9% 100 mL infusion     Question:  Insulin rate changes (DO NOT MODIFY ANSWER)  Answer:  \\[email or web address]\epic\Images\Pharmacy\InsulinInfusions\CTS INSULIN [id number].pdf    -- IV Continuous 01/06/20 1227             PHYSICAL EXAMINATION:  Vitals:    01/06/20 1630   BP:    Pulse: 66   Resp: (!) 9   Temp:      Body mass index is 28.41 kg/m².    Physical Exam   Constitutional: Well developed, well nourished, NAD.  ENT: External ears no masses with nose patent; normal hearing.  Neck: Supple; trachea midline.  Cardiovascular: Normal heart sounds, no LE edema. DP +2 bilaterally.  Lungs: Normal effort; lungs anterior bilaterally clear to auscultation.  Abdomen: Soft, no masses, no hernias.  MS: No clubbing or cyanosis of nails noted; r unable to assess gait.  Skin: No rashes, lesions, or ulcers; no nodules. Mid-sternal incision with dressing; chest  tubes.  Psychiatric: Good judgement and insight; normal mood and affect.  Neurological: Cranial nerves are grossly intact.  Foot: nails in good condition, no amputations noted.

## 2020-01-06 NOTE — HPI
Reason for Consult: Management of Hyperglycemia     Surgical Procedure and Date: ascending aorta replacement 1/6/20    HPI:  Patient is a 59 y.o. male with a diagnosis of CAD, pre-diabetes, and ascending aortic aneurysm. Patient is s/p ascending aorta replacement. Patient reports being pre-Dm for years with no change. Endocrinology consulted for BG management.

## 2020-01-06 NOTE — ANESTHESIA PROCEDURE NOTES
Central Line    Diagnosis: Ascending aortic aneurysm  Patient location during procedure: done in OR  Procedure start time: 1/6/2020 7:35 AM  Timeout: 1/6/2020 7:32 AM  Procedure end time: 1/6/2020 7:50 AM    Staffing  Authorizing Provider: Silvio Cantor Jr., MD  Performing Provider: Garrison Nolasco MD    Staffing  Anesthesiologist: Silvio Cantor Jr., MD  Resident/CRNA: Garrison Nolasco MD  Performed: resident/CRNA   Anesthesiologist was present at the time of the procedure.  Preanesthetic Checklist  Completed: patient identified, site marked, surgical consent, pre-op evaluation, timeout performed, IV checked, risks and benefits discussed, monitors and equipment checked and anesthesia consent given  Indication   Indication: hemodynamic monitoring, vascular access, med administration     Anesthesia   general anesthesia    Central Line   Skin Prep: skin prepped with ChloraPrep, skin prep agent completely dried prior to procedure  maximum sterile barriers used during central venous catheter insertion  hand hygiene performed prior to central venous catheter insertion  Location: right, internal jugular.   Catheter type: quad lumen  Catheter Size: 8.5 Fr  Inserted Catheter Length: 16 cm  Ultrasound: vascular probe with ultrasound  Vessel Caliber: patent  Needle advanced into vessel with real time Ultrasound guidance.  Guidewire confirmed in vessel.  Image recorded and saved.   Manometry: Venous cannualation confirmed by visual estimation of blood vessel pressure using manometry.  Insertion Attempts: 1   Securement:line sutured, chlorhexidine patch, sterile dressing applied and blood return through all ports    Post-Procedure   Adverse Events:none    Guidewire Guidewire removed intact.

## 2020-01-06 NOTE — ASSESSMENT & PLAN NOTE
59 y.o. male w/ a significant PMHx of hyperlipidemia, coronary artery disease, bicuspid aortic valve and thoracic aortic aneurysm Day of Surgery post op from replacement of ascending aorta.  Patient arrived to SICU extubated off all vasopressor support.     Neuro:   - Oxycodone as needed for pain  - Multimodal pain regimen    Pulmonary:   - Extubated  - Supplemental oxygen as needed  - ABGs PRN  - BTs, DuoNebs    Cardiac:   - Patient with normal biventricular function on intraoperative MARIANNE  - Hemodynamically stable off of vasopressors  - MAP goals of 60 to 80 with SBP < 110 mmgh  - Cardene as needed hemodynamic goals  - Monitor chest tube output  - ASA  - Anticoagulation per CTS    Renal:    - Monitor UOP  - Trend BUN/Cr    ID:   - Afebrile  - No leukocytosis  - Trend WBC daily  - Complete perioperative antibiotics    Hem/Onc:   - H/H stable  - Transfuse per CTS    Endocrine:    - Insulin gtt for glucose control  - Endocrine consulted, appreciate assistance    Fluids/Electrolytes/Nutrition/GI:   - Replace electrolytes PRN  - mIVF per CTS  - Currently NPO, diet per CTS    PPx:  - DVT: SCD  - Bowel: Miralax, Colace  - GI: Protonix      DISPO:  - Continue SICU care

## 2020-01-06 NOTE — OP NOTE
DATE OF PROCEDURE:  01/06/2020    ATTENDING SURGEON:  Allen Baez M.D.    PREOPERATIVE DIAGNOSES:  1. Ascending aortic aneurysm.  2. Coronary artery disease.  3. Hyperlipidemia.    POSTOPERATIVE DIAGNOSES:  1. Ascending aortic aneurysm.  2. Coronary artery disease.  3. Hyperlipidemia.    OPERATION PERFORMED:  Ascending aortic replacement with a 28 mm Gelweave Dacron   tube graft.    ANESTHESIA:  General endotracheal.    ESTIMATED BLOOD LOSS:  100 mL.    BRIEF HISTORY:  Mr. Gee is a 59-year-old gentleman who initially had a CT   calcium score done as part of a routine physical exam.  This study demonstrated   an ascending aortic aneurysm that was roughly 5.5 cm in diameter.  Based on this   finding, he underwent a complete evaluation, which included coronary   angiography as well as echocardiography.  The angiogram failed to demonstrate   any hemodynamically significant lesions.  The echo demonstrated a bicuspid   aortic valve with normal function.  He now presents for ascending aortic   replacement.    PROCEDURE IN DETAIL:  After obtaining informed and written consent, the patient   was brought to the Operating Room and placed on the operating table in supine   position.  After induction of adequate general endotracheal anesthesia, the   patient's chest, abdomen, pelvis and bilateral lower extremities were prepped   and draped in the usual sterile fashion.  An upper midline skin incision was   made and a median sternotomy was performed.  A pericardial well was created.    The patient was systemically heparinized.  Cannulation sutures were placed in   the proximal aortic arch and in the right atrial appendage.  The aortic cannula   was inserted, followed by the venous.  Antegrade and retrograde cardioplegia   catheters were placed.  The patient was then put on cardiopulmonary bypass.    Once on bypass, the aorta was  from the pulmonary artery.  A left   ventricular vent was placed through the  right superior pulmonary vein.  The   aortic cross-clamp was applied just proximal to the origin of the innominate   artery and the heart was arrested using cold blood enhanced antegrade   cardioplegia.  A prompt electromechanical arrest was achieved.  Once the   cardioplegia was all in, the aorta was transected at the level of the right   pulmonary artery.  It was resected distally to within 1 cm of the clamp.    Proximally, it was resected to the level of the sinotubular junction.  The valve   was evaluated.  It was a Aliyah 0 valve with no evidence of calcification.  There   were no fenestrations.  The proximal aorta was sized and a 28 mm graft was   selected.  The graft was brought up and the proximal anastomosis was performed   using a running 4-0 Prolene suture reinforced with a Felt strip.  The distal   portion of the graft was tailored and the distal anastomosis was performed using   a running 4-0 Prolene suture reinforced with a Felt strip.  An aortic root vent   was placed.  The hot shot was given retrograde and de-airing maneuvers were   performed.  Once the hot shot was all in, the aortic cross-clamp was removed.    The patient was subsequently weaned from cardiopulmonary bypass.  The patient   did separate easily from bypass.  Once off bypass, all surgical sites were   inspected.  There was good hemostasis.  The valve was evaluated using MARIANNE and   appeared to be functioning properly.  The test dose of protamine was   administered and this was well tolerated.  The total dose was then given.    [facility] through the total dose, all cannulas were removed.  All surgical sites   were again inspected, again there was good hemostasis.  Ventricular pacing wires   were placed.  Two drains were placed.  After again confirming adequate   hemostasis, the mediastinal fat was closed over the distal aorta to prevent   contact with the sternum.  The sternum was then reapproximated using eight #6   stainless steel wires.   The overlying soft tissues were reapproximated using   absorbable suture material.  The patient's chest was washed and dried and a dry   dressing was applied.  The patient tolerated the procedure well, there were no   complications.  At the conclusion of the case, sponge and instrument counts were   correct. No qualified resident was available to assist with this operation.               PP/IN  dd: 01/06/2020 12:16:10 (CST)  td: 01/06/2020 16:50:42 (CST)  Doc ID   #[id number]  Job ID #[id number]    CC:     [id number]

## 2020-01-06 NOTE — HPI
Guillermo Gee is a 59 y.o. male with hyperlipidemia and coronary artery disease and a 5.4 cm ascending aortic aneurysm status post repair on 1/6/2019.   Patient arrived to SICU extubated on no vasopressors.  Currently awake and alert not complaining of pain.  Patient received the following intraop:    2300 Crystalloid  550 cc Cell Saver

## 2020-01-06 NOTE — INTERVAL H&P NOTE
The patient has been examined and the H&P has been reviewed:    I concur with the findings and no changes have occurred since H&P was written.    Anesthesia/Surgery risks, benefits and alternative options discussed and understood by patient/family.          Active Hospital Problems    Diagnosis  POA    Ascending aortic aneurysm [I71.2]  Yes      Resolved Hospital Problems   No resolved problems to display.

## 2020-01-06 NOTE — ASSESSMENT & PLAN NOTE
BG goal 140-180.     Diet advancing. BG below goal without insulin.   Change to BG monitoring ac/hs/0200 and moderate dose correction scale.

## 2020-01-06 NOTE — SUBJECTIVE & OBJECTIVE
Follow-up For: Procedure(s) (LRB):  REPLACEMENT, AORTA, ASCENDING (N/A)    Post-Operative Day: Day of Surgery     Past Medical History:   Diagnosis Date    CAD (coronary artery disease) 12/19/2019    Diverticulosis     Family history of prostate cancer in father     Kidney stones        Past Surgical History:   Procedure Laterality Date    INGUINAL HERNIA REPAIR Left     LEFT HEART CATHETERIZATION Left 12/19/2019    Procedure: CATHETERIZATION, HEART, LEFT;  Surgeon: John Kyle MD;  Location: [facility] CATH LAB;  Service: Cardiology;  Laterality: Left;    SHOULDER ARTHROSCOPY W/ ROTATOR CUFF REPAIR Bilateral 2011, 2012    UMBILICAL HERNIA REPAIR         Review of patient's allergies indicates:   Allergen Reactions    Codeine Itching       Family History     Problem Relation (Age of Onset)    Colon cancer Paternal Uncle    No Known Problems Mother, Sister, Brother, Brother    Prostate cancer Father        Tobacco Use    Smoking status: Current Some Day Smoker     Types: Cigarettes     Start date: 12/27/1981    Smokeless tobacco: Never Used    Tobacco comment: occ cig use: ~ 3/day   Substance and Sexual Activity    Alcohol use: Yes     Alcohol/week: 6.0 - 8.0 standard drinks     Types: 3 - 4 Glasses of wine, 3 - 4 Standard drinks or equivalent per week     Frequency: 2-3 times a week     Drinks per session: 1 or 2     Binge frequency: Less than monthly    Drug use: Never    Sexual activity: Yes     Partners: Female      Review of Systems   Unable to perform ROS: Acuity of condition     Objective:     Vital Signs (Most Recent):  Temp: (P) 98 °F (36.7 °C) (01/06/20 1230)  Pulse: 66 (01/06/20 1315)  Resp: (!) 22 (01/06/20 1315)  BP: 107/69 (01/06/20 0527)  SpO2: 98 % (01/06/20 1315) Vital Signs (24h Range):  Temp:  [97.9 °F (36.6 °C)-98 °F (36.7 °C)] (P) 98 °F (36.7 °C)  Pulse:  [55-68] 66  Resp:  [14-24] 22  SpO2:  [96 %-100 %] 98 %  BP: (107)/(69) 107/69     Weight: 89.8 kg (198 lb)  Body mass index  is 28.41 kg/m².      Intake/Output Summary (Last 24 hours) at 1/6/2020 1328  Last data filed at 1/6/2020 1230  Gross per 24 hour   Intake 2850 ml   Output 1130 ml   Net 1720 ml       Physical Exam   Constitutional: He appears well-developed.   HENT:   Head: Normocephalic.   Eyes: Pupils are equal, round, and reactive to light.   Neck:   RIJ quad lumen   Cardiovascular: Normal rate and regular rhythm.   Mediastinal drains x2, Pleural drain x1 in place to suction.  Sternotomy dressed with dressing C/D/I   Pulmonary/Chest: Effort normal and breath sounds normal. No respiratory distress.   Abdominal: Soft. He exhibits no distension.   Genitourinary:   Genitourinary Comments: Johnson Catheter in place   Musculoskeletal: Normal range of motion.   Neurological: He is alert.   Skin: Skin is warm and dry.   Nursing note and vitals reviewed.      Vents:       Lines/Drains/Airways     Central Venous Catheter Line                 Percutaneous Central Line Insertion/Assessment - Quad lumen  01/06/20 0735 less than 1 day         Percutaneous Central Line Insertion/Assessment - quad lumen  01/06/20 0735 less than 1 day          Drain                 Urethral Catheter 01/06/20 0730 Non-latex 16 Fr. less than 1 day         Y Chest Tube 1 and 2 01/06/20 1140 1 Anterior Mediastinal 19 Fr. 2 Anterior Mediastinal 19 Fr. less than 1 day          Arterial Line                 Arterial Line 01/06/20 0713 Left Radial less than 1 day          Line                 Pacer Wires 01/06/20 1135 less than 1 day          Peripheral Intravenous Line                 Peripheral IV - Single Lumen 01/06/20 0730 18 G Right Hand less than 1 day                Significant Labs:    CBC/Anemia Profile:  Recent Labs   Lab 01/06/20  1020 01/06/20  1228 01/06/20  1238   WBC  --  14.40*  --    HGB  --  12.3*  --    HCT 32* 37.4* 34*   PLT  --  147*  --    MCV  --  91  --    RDW  --  13.0  --         Chemistries:  Recent Labs   Lab 01/06/20  1228      K 4.6       CO2 24   BUN 9   CREATININE 0.9   CALCIUM 7.7*   MG 2.3   PHOS 3.4       All pertinent labs within the past 24 hours have been reviewed.    Significant Imaging:  CXR:   One view: Central line mid SVC.  There is cardiomegaly.  There is mild perihilar atelectasis.  There is postoperative change.

## 2020-01-07 PROBLEM — I71.21 ASCENDING AORTIC ANEURYSM: Status: ACTIVE | Noted: 2020-01-07

## 2020-01-07 PROBLEM — I71.21 ASCENDING AORTIC ANEURYSM: Status: RESOLVED | Noted: 2020-01-06 | Resolved: 2020-01-07

## 2020-01-07 LAB
ALBUMIN SERPL BCP-MCNC: 4.2 G/DL (ref 3.5–5.2)
ALLENS TEST: ABNORMAL
ALP SERPL-CCNC: 32 U/L (ref 55–135)
ALT SERPL W/O P-5'-P-CCNC: 12 U/L (ref 10–44)
ANION GAP SERPL CALC-SCNC: 9 MMOL/L (ref 8–16)
ANION GAP SERPL CALC-SCNC: 9 MMOL/L (ref 8–16)
APTT BLDCRRT: 24.5 SEC (ref 21–32)
AST SERPL-CCNC: 27 U/L (ref 10–40)
BASOPHILS # BLD AUTO: 0.01 K/UL (ref 0–0.2)
BASOPHILS NFR BLD: 0.1 % (ref 0–1.9)
BILIRUB SERPL-MCNC: 1.1 MG/DL (ref 0.1–1)
BUN SERPL-MCNC: 10 MG/DL (ref 6–20)
BUN SERPL-MCNC: 11 MG/DL (ref 6–20)
CALCIUM SERPL-MCNC: 7.9 MG/DL (ref 8.7–10.5)
CALCIUM SERPL-MCNC: 8.1 MG/DL (ref 8.7–10.5)
CHLORIDE SERPL-SCNC: 104 MMOL/L (ref 95–110)
CHLORIDE SERPL-SCNC: 104 MMOL/L (ref 95–110)
CO2 SERPL-SCNC: 20 MMOL/L (ref 23–29)
CO2 SERPL-SCNC: 21 MMOL/L (ref 23–29)
CREAT SERPL-MCNC: 0.9 MG/DL (ref 0.5–1.4)
CREAT SERPL-MCNC: 1.1 MG/DL (ref 0.5–1.4)
DELSYS: ABNORMAL
DIFFERENTIAL METHOD: ABNORMAL
EOSINOPHIL # BLD AUTO: 0 K/UL (ref 0–0.5)
EOSINOPHIL NFR BLD: 0 % (ref 0–8)
ERYTHROCYTE [DISTWIDTH] IN BLOOD BY AUTOMATED COUNT: 13.1 % (ref 11.5–14.5)
EST. GFR  (AFRICAN AMERICAN): >60 ML/MIN/1.73 M^2
EST. GFR  (AFRICAN AMERICAN): >60 ML/MIN/1.73 M^2
EST. GFR  (NON AFRICAN AMERICAN): >60 ML/MIN/1.73 M^2
EST. GFR  (NON AFRICAN AMERICAN): >60 ML/MIN/1.73 M^2
GLUCOSE SERPL-MCNC: 117 MG/DL (ref 70–110)
GLUCOSE SERPL-MCNC: 161 MG/DL (ref 70–110)
HCO3 UR-SCNC: 19.4 MMOL/L (ref 24–28)
HCT VFR BLD AUTO: 35.5 % (ref 40–54)
HGB BLD-MCNC: 11.6 G/DL (ref 14–18)
IMM GRANULOCYTES # BLD AUTO: 0.03 K/UL (ref 0–0.04)
IMM GRANULOCYTES NFR BLD AUTO: 0.3 % (ref 0–0.5)
INR PPP: 1.1 (ref 0.8–1.2)
LYMPHOCYTES # BLD AUTO: 1.3 K/UL (ref 1–4.8)
LYMPHOCYTES NFR BLD: 15.1 % (ref 18–48)
MAGNESIUM SERPL-MCNC: 1.9 MG/DL (ref 1.6–2.6)
MCH RBC QN AUTO: 29.5 PG (ref 27–31)
MCHC RBC AUTO-ENTMCNC: 32.7 G/DL (ref 32–36)
MCV RBC AUTO: 90 FL (ref 82–98)
MONOCYTES # BLD AUTO: 0.8 K/UL (ref 0.3–1)
MONOCYTES NFR BLD: 9.5 % (ref 4–15)
NEUTROPHILS # BLD AUTO: 6.6 K/UL (ref 1.8–7.7)
NEUTROPHILS NFR BLD: 75 % (ref 38–73)
NRBC BLD-RTO: 0 /100 WBC
PCO2 BLDA: 34.8 MMHG (ref 35–45)
PH SMN: 7.35 [PH] (ref 7.35–7.45)
PHOSPHATE SERPL-MCNC: 2.7 MG/DL (ref 2.7–4.5)
PLATELET # BLD AUTO: 133 K/UL (ref 150–350)
PMV BLD AUTO: 10.3 FL (ref 9.2–12.9)
PO2 BLDA: 61 MMHG (ref 80–100)
POC BE: -6 MMOL/L
POC SATURATED O2: 90 % (ref 95–100)
POC TCO2: 20 MMOL/L (ref 23–27)
POCT GLUCOSE: 110 MG/DL (ref 70–110)
POCT GLUCOSE: 111 MG/DL (ref 70–110)
POCT GLUCOSE: 148 MG/DL (ref 70–110)
POCT GLUCOSE: 159 MG/DL (ref 70–110)
POCT GLUCOSE: 96 MG/DL (ref 70–110)
POTASSIUM SERPL-SCNC: 3.8 MMOL/L (ref 3.5–5.1)
POTASSIUM SERPL-SCNC: 3.9 MMOL/L (ref 3.5–5.1)
POTASSIUM SERPL-SCNC: 3.9 MMOL/L (ref 3.5–5.1)
POTASSIUM SERPL-SCNC: 4 MMOL/L (ref 3.5–5.1)
PROT SERPL-MCNC: 6.3 G/DL (ref 6–8.4)
PROTHROMBIN TIME: 11.6 SEC (ref 9–12.5)
RBC # BLD AUTO: 3.93 M/UL (ref 4.6–6.2)
SAMPLE: ABNORMAL
SITE: ABNORMAL
SODIUM SERPL-SCNC: 133 MMOL/L (ref 136–145)
SODIUM SERPL-SCNC: 134 MMOL/L (ref 136–145)
WBC # BLD AUTO: 8.83 K/UL (ref 3.9–12.7)

## 2020-01-07 PROCEDURE — 99900035 HC TECH TIME PER 15 MIN (STAT)

## 2020-01-07 PROCEDURE — P9045 ALBUMIN (HUMAN), 5%, 250 ML: HCPCS | Mod: JG | Performed by: STUDENT IN AN ORGANIZED HEALTH CARE EDUCATION/TRAINING PROGRAM

## 2020-01-07 PROCEDURE — 97161 PT EVAL LOW COMPLEX 20 MIN: CPT

## 2020-01-07 PROCEDURE — 63600175 PHARM REV CODE 636 W HCPCS: Mod: JG

## 2020-01-07 PROCEDURE — 85730 THROMBOPLASTIN TIME PARTIAL: CPT

## 2020-01-07 PROCEDURE — 27000221 HC OXYGEN, UP TO 24 HOURS

## 2020-01-07 PROCEDURE — 80053 COMPREHEN METABOLIC PANEL: CPT

## 2020-01-07 PROCEDURE — 99291 CRITICAL CARE FIRST HOUR: CPT | Mod: ,,, | Performed by: SURGERY

## 2020-01-07 PROCEDURE — 84100 ASSAY OF PHOSPHORUS: CPT

## 2020-01-07 PROCEDURE — 25000003 PHARM REV CODE 250: Performed by: STUDENT IN AN ORGANIZED HEALTH CARE EDUCATION/TRAINING PROGRAM

## 2020-01-07 PROCEDURE — 97165 OT EVAL LOW COMPLEX 30 MIN: CPT

## 2020-01-07 PROCEDURE — 37799 UNLISTED PX VASCULAR SURGERY: CPT

## 2020-01-07 PROCEDURE — 82803 BLOOD GASES ANY COMBINATION: CPT

## 2020-01-07 PROCEDURE — 80048 BASIC METABOLIC PNL TOTAL CA: CPT

## 2020-01-07 PROCEDURE — P9045 ALBUMIN (HUMAN), 5%, 250 ML: HCPCS | Mod: JG

## 2020-01-07 PROCEDURE — 63600175 PHARM REV CODE 636 W HCPCS: Performed by: NURSE PRACTITIONER

## 2020-01-07 PROCEDURE — 97116 GAIT TRAINING THERAPY: CPT

## 2020-01-07 PROCEDURE — 97535 SELF CARE MNGMENT TRAINING: CPT

## 2020-01-07 PROCEDURE — C9113 INJ PANTOPRAZOLE SODIUM, VIA: HCPCS | Performed by: NURSE PRACTITIONER

## 2020-01-07 PROCEDURE — 94761 N-INVAS EAR/PLS OXIMETRY MLT: CPT

## 2020-01-07 PROCEDURE — 85025 COMPLETE CBC W/AUTO DIFF WBC: CPT

## 2020-01-07 PROCEDURE — 84132 ASSAY OF SERUM POTASSIUM: CPT

## 2020-01-07 PROCEDURE — 63600175 PHARM REV CODE 636 W HCPCS: Mod: JG | Performed by: STUDENT IN AN ORGANIZED HEALTH CARE EDUCATION/TRAINING PROGRAM

## 2020-01-07 PROCEDURE — 85610 PROTHROMBIN TIME: CPT

## 2020-01-07 PROCEDURE — 20000000 HC ICU ROOM

## 2020-01-07 PROCEDURE — 94640 AIRWAY INHALATION TREATMENT: CPT

## 2020-01-07 PROCEDURE — 94799 UNLISTED PULMONARY SVC/PX: CPT

## 2020-01-07 PROCEDURE — 25000242 PHARM REV CODE 250 ALT 637 W/ HCPCS: Performed by: NURSE PRACTITIONER

## 2020-01-07 PROCEDURE — 25000003 PHARM REV CODE 250: Performed by: NURSE PRACTITIONER

## 2020-01-07 PROCEDURE — 99291 PR CRITICAL CARE, E/M 30-74 MINUTES: ICD-10-PCS | Mod: ,,, | Performed by: SURGERY

## 2020-01-07 PROCEDURE — 83735 ASSAY OF MAGNESIUM: CPT

## 2020-01-07 PROCEDURE — 97110 THERAPEUTIC EXERCISES: CPT

## 2020-01-07 RX ORDER — ALBUMIN HUMAN 50 G/1000ML
SOLUTION INTRAVENOUS
Status: COMPLETED
Start: 2020-01-07 | End: 2020-01-07

## 2020-01-07 RX ORDER — ALBUMIN HUMAN 50 G/1000ML
12.5 SOLUTION INTRAVENOUS ONCE
Status: DISCONTINUED | OUTPATIENT
Start: 2020-01-07 | End: 2020-01-07

## 2020-01-07 RX ORDER — ALBUMIN HUMAN 250 G/1000ML
25 SOLUTION INTRAVENOUS ONCE
Status: DISCONTINUED | OUTPATIENT
Start: 2020-01-07 | End: 2020-01-07

## 2020-01-07 RX ORDER — ATORVASTATIN CALCIUM 10 MG/1
10 TABLET, FILM COATED ORAL NIGHTLY
Status: DISCONTINUED | OUTPATIENT
Start: 2020-01-07 | End: 2020-01-10 | Stop reason: HOSPADM

## 2020-01-07 RX ORDER — ALBUMIN HUMAN 50 G/1000ML
12.5 SOLUTION INTRAVENOUS ONCE
Status: COMPLETED | OUTPATIENT
Start: 2020-01-07 | End: 2020-01-07

## 2020-01-07 RX ORDER — DIPHENHYDRAMINE HCL 25 MG
25 CAPSULE ORAL ONCE
Status: COMPLETED | OUTPATIENT
Start: 2020-01-07 | End: 2020-01-07

## 2020-01-07 RX ORDER — ALBUMIN HUMAN 50 G/1000ML
25 SOLUTION INTRAVENOUS ONCE
Status: DISCONTINUED | OUTPATIENT
Start: 2020-01-07 | End: 2020-01-08

## 2020-01-07 RX ORDER — PANTOPRAZOLE SODIUM 40 MG/1
40 TABLET, DELAYED RELEASE ORAL DAILY
Status: DISCONTINUED | OUTPATIENT
Start: 2020-01-08 | End: 2020-01-10 | Stop reason: HOSPADM

## 2020-01-07 RX ORDER — ALBUMIN HUMAN 50 G/1000ML
25 SOLUTION INTRAVENOUS ONCE
Status: COMPLETED | OUTPATIENT
Start: 2020-01-07 | End: 2020-01-07

## 2020-01-07 RX ORDER — OXYCODONE HYDROCHLORIDE 5 MG/1
5 TABLET ORAL ONCE
Status: COMPLETED | OUTPATIENT
Start: 2020-01-07 | End: 2020-01-07

## 2020-01-07 RX ADMIN — POTASSIUM CHLORIDE 20 MEQ: 14.9 INJECTION, SOLUTION INTRAVENOUS at 04:01

## 2020-01-07 RX ADMIN — CEFAZOLIN 2 G: 1 INJECTION, POWDER, FOR SOLUTION INTRAMUSCULAR; INTRAVENOUS at 12:01

## 2020-01-07 RX ADMIN — IPRATROPIUM BROMIDE AND ALBUTEROL SULFATE 3 ML: .5; 3 SOLUTION RESPIRATORY (INHALATION) at 08:01

## 2020-01-07 RX ADMIN — OXYCODONE HYDROCHLORIDE 5 MG: 5 TABLET ORAL at 10:01

## 2020-01-07 RX ADMIN — OXYCODONE HYDROCHLORIDE 5 MG: 5 TABLET ORAL at 11:01

## 2020-01-07 RX ADMIN — ALBUMIN (HUMAN) 25 G: 12.5 SOLUTION INTRAVENOUS at 04:01

## 2020-01-07 RX ADMIN — OXYCODONE HYDROCHLORIDE 5 MG: 5 TABLET ORAL at 04:01

## 2020-01-07 RX ADMIN — POLYETHYLENE GLYCOL 3350 17 G: 17 POWDER, FOR SOLUTION ORAL at 08:01

## 2020-01-07 RX ADMIN — OXYCODONE HYDROCHLORIDE 10 MG: 10 TABLET ORAL at 07:01

## 2020-01-07 RX ADMIN — CEFAZOLIN 2 G: 1 INJECTION, POWDER, FOR SOLUTION INTRAMUSCULAR; INTRAVENOUS at 04:01

## 2020-01-07 RX ADMIN — ASPIRIN 325 MG ORAL TABLET 325 MG: 325 PILL ORAL at 08:01

## 2020-01-07 RX ADMIN — INSULIN ASPART 2 UNITS: 100 INJECTION, SOLUTION INTRAVENOUS; SUBCUTANEOUS at 12:01

## 2020-01-07 RX ADMIN — MAGNESIUM SULFATE IN WATER 2 G: 40 INJECTION, SOLUTION INTRAVENOUS at 07:01

## 2020-01-07 RX ADMIN — ALBUMIN HUMAN 12.5 G: 50 SOLUTION INTRAVENOUS at 06:01

## 2020-01-07 RX ADMIN — ALBUMIN (HUMAN) 25 G: 12.5 SOLUTION INTRAVENOUS at 06:01

## 2020-01-07 RX ADMIN — ALBUMIN HUMAN 12.5 G: 50 SOLUTION INTRAVENOUS at 02:01

## 2020-01-07 RX ADMIN — MUPIROCIN 1 G: 20 OINTMENT TOPICAL at 09:01

## 2020-01-07 RX ADMIN — ALBUMIN (HUMAN) 12.5 G: 12.5 SOLUTION INTRAVENOUS at 04:01

## 2020-01-07 RX ADMIN — OXYCODONE HYDROCHLORIDE 5 MG: 5 TABLET ORAL at 09:01

## 2020-01-07 RX ADMIN — CEFAZOLIN 2 G: 1 INJECTION, POWDER, FOR SOLUTION INTRAMUSCULAR; INTRAVENOUS at 09:01

## 2020-01-07 RX ADMIN — MAGNESIUM SULFATE IN WATER 2 G: 40 INJECTION, SOLUTION INTRAVENOUS at 04:01

## 2020-01-07 RX ADMIN — IPRATROPIUM BROMIDE AND ALBUTEROL SULFATE 3 ML: .5; 3 SOLUTION RESPIRATORY (INHALATION) at 04:01

## 2020-01-07 RX ADMIN — PANTOPRAZOLE SODIUM 40 MG: 40 INJECTION, POWDER, FOR SOLUTION INTRAVENOUS at 09:01

## 2020-01-07 RX ADMIN — MUPIROCIN 1 G: 20 OINTMENT TOPICAL at 08:01

## 2020-01-07 RX ADMIN — DOCUSATE SODIUM 100 MG: 100 CAPSULE, LIQUID FILLED ORAL at 09:01

## 2020-01-07 RX ADMIN — IPRATROPIUM BROMIDE AND ALBUTEROL SULFATE 3 ML: .5; 3 SOLUTION RESPIRATORY (INHALATION) at 12:01

## 2020-01-07 RX ADMIN — DOCUSATE SODIUM 100 MG: 100 CAPSULE, LIQUID FILLED ORAL at 08:01

## 2020-01-07 RX ADMIN — ACETAMINOPHEN 650 MG: 325 TABLET ORAL at 10:01

## 2020-01-07 RX ADMIN — POTASSIUM CHLORIDE 20 MEQ: 14.9 INJECTION, SOLUTION INTRAVENOUS at 09:01

## 2020-01-07 RX ADMIN — DIPHENHYDRAMINE HYDROCHLORIDE 25 MG: 25 CAPSULE ORAL at 03:01

## 2020-01-07 RX ADMIN — EPINEPHRINE 0.06 MCG/KG/MIN: 1 INJECTION PARENTERAL at 07:01

## 2020-01-07 RX ADMIN — ACETAMINOPHEN 650 MG: 325 TABLET ORAL at 12:01

## 2020-01-07 NOTE — PLAN OF CARE
Patient with stable vital signs. Patient on 2L nasal cannula with o2sats 93-95%. Patient encouraged to use IS at bedside. Patient with family at bedside. Patient and family updated on plan of care for the day per Dr. Baez.. Dr. Wagner updated throughout the day on patient's assessments, vital signs, and lab results. Will continue to monitor patient.       Problem: Adult Inpatient Plan of Care  Goal: Absence of Hospital-Acquired Illness or Injury  Outcome: Ongoing, Progressing  Intervention: Identify and Manage Fall Risk  Flowsheets (Taken 1/7/2020 1634)  Safety Promotion/Fall Prevention: instructed to call staff for mobility; Fall Risk reviewed with patient/family  Goal: Readiness for Transition of Care  Outcome: Ongoing, Progressing     Problem: Adult Inpatient Plan of Care  Goal: Absence of Hospital-Acquired Illness or Injury  Outcome: Ongoing, Progressing  Intervention: Identify and Manage Fall Risk  Flowsheets (Taken 1/7/2020 1634)  Safety Promotion/Fall Prevention: instructed to call staff for mobility; Fall Risk reviewed with patient/family  Goal: Readiness for Transition of Care  Outcome: Ongoing, Progressing     Problem: Adult Inpatient Plan of Care  Goal: Absence of Hospital-Acquired Illness or Injury  Outcome: Ongoing, Progressing  Intervention: Identify and Manage Fall Risk  Flowsheets (Taken 1/7/2020 1634)  Safety Promotion/Fall Prevention: instructed to call staff for mobility; Fall Risk reviewed with patient/family  Goal: Readiness for Transition of Care  Outcome: Ongoing, Progressing     Problem: Adult Inpatient Plan of Care  Goal: Absence of Hospital-Acquired Illness or Injury  Outcome: Ongoing, Progressing  Intervention: Identify and Manage Fall Risk  Flowsheets (Taken 1/7/2020 1634)  Safety Promotion/Fall Prevention: instructed to call staff for mobility; Fall Risk reviewed with patient/family  Goal: Readiness for Transition of Care  Outcome: Ongoing, Progressing     Problem: Adult Inpatient  Plan of Care  Goal: Absence of Hospital-Acquired Illness or Injury  Outcome: Ongoing, Progressing  Intervention: Identify and Manage Fall Risk  Flowsheets (Taken 1/7/2020 1634)  Safety Promotion/Fall Prevention: instructed to call staff for mobility; Fall Risk reviewed with patient/family  Goal: Readiness for Transition of Care  Outcome: Ongoing, Progressing     Problem: Adult Inpatient Plan of Care  Goal: Absence of Hospital-Acquired Illness or Injury  Outcome: Ongoing, Progressing  Intervention: Identify and Manage Fall Risk  Flowsheets (Taken 1/7/2020 1634)  Safety Promotion/Fall Prevention: instructed to call staff for mobility; Fall Risk reviewed with patient/family  Goal: Readiness for Transition of Care  Outcome: Ongoing, Progressing     Problem: Adult Inpatient Plan of Care  Goal: Absence of Hospital-Acquired Illness or Injury  Outcome: Ongoing, Progressing  Intervention: Identify and Manage Fall Risk  Flowsheets (Taken 1/7/2020 1634)  Safety Promotion/Fall Prevention: instructed to call staff for mobility; Fall Risk reviewed with patient/family  Goal: Readiness for Transition of Care  Outcome: Ongoing, Progressing     Problem: Adult Inpatient Plan of Care  Goal: Absence of Hospital-Acquired Illness or Injury  Outcome: Ongoing, Progressing  Intervention: Identify and Manage Fall Risk  Flowsheets (Taken 1/7/2020 1634)  Safety Promotion/Fall Prevention: instructed to call staff for mobility; Fall Risk reviewed with patient/family  Goal: Readiness for Transition of Care  Outcome: Ongoing, Progressing     Problem: Adult Inpatient Plan of Care  Goal: Absence of Hospital-Acquired Illness or Injury  Outcome: Ongoing, Progressing  Intervention: Identify and Manage Fall Risk  Flowsheets (Taken 1/7/2020 1634)  Safety Promotion/Fall Prevention: instructed to call staff for mobility; Fall Risk reviewed with patient/family  Goal: Readiness for Transition of Care  Outcome: Ongoing, Progressing     Problem: Adult Inpatient  Plan of Care  Goal: Absence of Hospital-Acquired Illness or Injury  Outcome: Ongoing, Progressing  Intervention: Identify and Manage Fall Risk  Flowsheets (Taken 1/7/2020 1634)  Safety Promotion/Fall Prevention: instructed to call staff for mobility; Fall Risk reviewed with patient/family  Goal: Readiness for Transition of Care  Outcome: Ongoing, Progressing     Problem: Adult Inpatient Plan of Care  Goal: Absence of Hospital-Acquired Illness or Injury  Outcome: Ongoing, Progressing  Intervention: Identify and Manage Fall Risk  Flowsheets (Taken 1/7/2020 1634)  Safety Promotion/Fall Prevention: instructed to call staff for mobility; Fall Risk reviewed with patient/family  Goal: Readiness for Transition of Care  Outcome: Ongoing, Progressing     Problem: Adult Inpatient Plan of Care  Goal: Absence of Hospital-Acquired Illness or Injury  Outcome: Ongoing, Progressing  Intervention: Identify and Manage Fall Risk  Flowsheets (Taken 1/7/2020 1634)  Safety Promotion/Fall Prevention: instructed to call staff for mobility; Fall Risk reviewed with patient/family  Goal: Readiness for Transition of Care  Outcome: Ongoing, Progressing

## 2020-01-07 NOTE — PROGRESS NOTES
Patient parameters of MAP 60-80 difficult to maintain through shift, MD made aware of difficulties, new goal of systolic over 100 after MD assessment. 1.5L albumin given and epi started at 0.02 and titrated up to 0.06 mcg/kg/min through shift. MD updated and pt updated with plan of care, will continue to monitor through shift.

## 2020-01-07 NOTE — CARE UPDATE
BG goal 140-180    Remains in ICU, NAEON  BG at or below goal overnight  Plan:    Continue moderate dose correction scale  BG monitoring AC/HS    Endocrine to continue to follow for now but will likely sign off once patient tolerating diet with no insulin needs given no history of DM    ** Please call Endocrine for any BG related issues **

## 2020-01-07 NOTE — PT/OT/SLP EVAL
Physical Therapy Evaluation and treatment    Patient Name:  Guillermo Gee   MRN:  [id number]    Recommendations:     Discharge Recommendations:  home health PT   Discharge Equipment Recommendations: (possibly a shower chair)   Barriers to discharge: Bedroom on second floor along with full bath.    Assessment:     Guillermo Gee is a 59 y.o. male admitted with a medical diagnosis of Ascending aortic aneurysm.  He presents with the following impairments/functional limitations:  impaired endurance, gait instability, impaired balance, pain, edema. Mr. Gee was able to ambulate ~200 ft with CGA today using no assistive device due to sternal precautions. He experienced no loss of balance, and his vitals were stable throughout. Pt did experience fatigue after his lower extremity exercises likely due to the somewhat lengthy gait trial. Will benefit from continued PT services to ensure safety upon return home.     Rehab Prognosis: Good ; patient would benefit from acute skilled PT services to address these deficits and reach maximum level of function.    Recent Surgery: Procedure(s) (LRB):  REPLACEMENT, AORTA, ASCENDING (N/A) 1 Day Post-Op    Plan:     During this hospitalization, patient to be seen 3 x/week to address the identified rehab impairments via gait training, therapeutic activities, therapeutic exercises, neuromuscular re-education and progress toward the following goals:    · Plan of Care Expires:  02/06/20    Subjective     Chief Complaint: None  Patient/Family Comments/goals: To return home and go upstairs to his room/full bath.  Pain/Comfort:  · Pain Rating 1: 0/10    Patients cultural, spiritual, Church conflicts given the current situation: no    Living Environment:  Lives alone, has a girlfriend and 2 daughters that can assist upon return home. Pt was working prior to admission as a claims  for Insurance.   Prior to admission, patients level of function was (I).  Equipment used at  home: none.  DME owned (not currently used): rolling walker.  Upon discharge, patient will have assistance from girlfriend, 2 daughters.    Objective:     Communicated with nurse prior to session (low BP concern due to Epi).  Patient found up in chair with chest tube, pulse ox (continuous), telemetry, central line, diaz catheter, arterial line, oxygen  upon PT entry to room.    General Precautions: Standard, sternal(clear liquids diet)   Orthopedic Precautions:    Braces:       Exams:  · Cognitive Exam:  Patient is oriented to Person, Place, Time and Situation  · RUE ROM: WNL  · RUE Strength: WNL  · LUE ROM: WNL  · LUE Strength: WNL  · RLE ROM: WNL  · RLE Strength: WNL  · LLE ROM: WNL  · LLE Strength: WNL    Functional Mobility:  · Transfers:  Sit to Stand:  contact guard assistance with no AD  · Gait: 200 ft with CGA, nurse and tech assisting with monitor, IV, diaz catheter, oxygen tank and chair.  · Balance: Able to stand with SBA-CGA       Therapeutic Activities and Exercises:  · Performed the following: LAQ, hip flexion x 20 reps (reported fatigue thereafter; performed after gait trial).  · Educated pt on sternal precautions and cued pt to ask how long the precautions would last as well as when he will be able to shower.  · Pt reported can stay downstairs with 1/2 bath and recliner if needed, upon return home.  White board updated with (A) level.  · Educated on frequency of therapies, safety of mobility while on the unit.  AM-PAC 6 CLICK MOBILITY  Total Score:18     Patient left up in chair with call button in reach and nurse present.    GOALS:   Multidisciplinary Problems     Physical Therapy Goals        Problem: Physical Therapy Goal    Goal Priority Disciplines Outcome Goal Variances Interventions   Physical Therapy Goal     PT, PT/OT Ongoing, Progressing     Description:  Goals to be met by: 1/14/20       Patient will increase functional independence with mobility by performing (while maintaining sternal  precautions):    1. Supine to sit with Modified Williamson.  2. Sit to supine with Modified Williamson.  3. Sit to stand transfer with Modified Williamson.  4. Bed to chair transfer with Modified Williamson.  5. Gait  x 200 feet with Supervision.  6. Ascend/descend 1 flight stair with Stand-by Assistance.  7. Stand for 3 minutes with Stand-by Assistance while performing a dynamic task.  8. Lower extremity exercise program x 20 reps per handout, with independence.                      History:     Past Medical History:   Diagnosis Date    CAD (coronary artery disease) 12/19/2019    Diverticulosis     Family history of prostate cancer in father     Kidney stones        Past Surgical History:   Procedure Laterality Date    INGUINAL HERNIA REPAIR Left     LEFT HEART CATHETERIZATION Left 12/19/2019    Procedure: CATHETERIZATION, HEART, LEFT;  Surgeon: John Kyle MD;  Location: [facility] CATH LAB;  Service: Cardiology;  Laterality: Left;    REPLACEMENT OF ASCENDING AORTA N/A 1/6/2020    Procedure: REPLACEMENT, AORTA, ASCENDING;  Surgeon: Allen Baez MD;  Location: [facility] OR [address] [place] [address];  Service: Cardiothoracic;  Laterality: N/A;    SHOULDER ARTHROSCOPY W/ ROTATOR CUFF REPAIR Bilateral 2011, 2012    UMBILICAL HERNIA REPAIR         Time Tracking:     PT Received On: 01/07/20  PT Start Time: 1146     PT Stop Time: 1225  PT Total Time (min): 39 min     Billable Minutes: Evaluation 9, Gait Training 15 and Therapeutic Exercise 15      Jerica Garcia, PT  01/07/2020

## 2020-01-07 NOTE — SUBJECTIVE & OBJECTIVE
Interval History/Significant Events:     Patient hypotensive overnight requiring epinephrine and 1500cc of albumin.  Patient remains alert and oriented in no acute distress.  Pain controlled.     Follow-up For: Procedure(s) (LRB):  REPLACEMENT, AORTA, ASCENDING (N/A)    Post-Operative Day: 1 Day Post-Op    Objective:     Vital Signs (Most Recent):  Temp: (!) 101.7 °F (38.7 °C) (01/07/20 0300)  Pulse: 86 (01/07/20 0600)  Resp: (!) 24 (01/07/20 0600)  BP: (!) 100/53 (01/07/20 0600)  SpO2: (!) 92 % (01/07/20 0600) Vital Signs (24h Range):  Temp:  [98 °F (36.7 °C)-101.7 °F (38.7 °C)] 101.7 °F (38.7 °C)  Pulse:  [58-86] 86  Resp:  [6-36] 24  SpO2:  [90 %-100 %] 92 %  BP: ()/(50-69) 100/53  Arterial Line BP: ()/(32-64) 85/33     Weight: 91 kg (200 lb 9.9 oz)  Body mass index is 28.79 kg/m².      Intake/Output Summary (Last 24 hours) at 1/7/2020 0706  Last data filed at 1/7/2020 0600  Gross per 24 hour   Intake 4037.48 ml   Output 3011 ml   Net 1026.48 ml       Physical Exam   Constitutional: He appears well-developed.   HENT:   Head: Normocephalic.   Eyes: Pupils are equal, round, and reactive to light.   Neck:   RIJ quad lumen   Cardiovascular: Normal rate and regular rhythm.   Mediastinal drains x2, Pleural drain x1 in place to suction.  Sternotomy dressed with dressing C/D/I   Pulmonary/Chest: Effort normal and breath sounds normal. No respiratory distress.   Abdominal: Soft. He exhibits no distension.   Genitourinary:   Genitourinary Comments: Johnson Catheter in place   Musculoskeletal: Normal range of motion.   Neurological: He is alert.   Skin: Skin is warm and dry.   Nursing note and vitals reviewed.      Vents:       Lines/Drains/Airways     Central Venous Catheter Line                 Percutaneous Central Line Insertion/Assessment - Quad lumen  01/06/20 0735 less than 1 day         Percutaneous Central Line Insertion/Assessment - quad lumen  01/06/20 0735 less than 1 day          Drain                  Urethral Catheter 01/06/20 0730 Non-latex 16 Fr. less than 1 day         Y Chest Tube 1 and 2 01/06/20 1140 1 Anterior Mediastinal 19 Fr. 2 Anterior Mediastinal 19 Fr. less than 1 day          Arterial Line                 Arterial Line 01/06/20 0713 Left Radial less than 1 day          Peripheral Intravenous Line                 Peripheral IV - Single Lumen 01/06/20 0730 18 G Right Hand less than 1 day                Significant Labs:    CBC/Anemia Profile:  Recent Labs   Lab 01/06/20  1228  01/06/20  1409 01/06/20  1517 01/07/20  0346   WBC 14.40*  --   --   --  8.83   HGB 12.3*  --   --   --  11.6*   HCT 37.4*   < > 36 36 35.5*   *  --   --   --  133*   MCV 91  --   --   --  90   RDW 13.0  --   --   --  13.1    < > = values in this interval not displayed.        Chemistries:  Recent Labs   Lab 01/06/20  1228 01/06/20  1320 01/06/20  1957 01/07/20  0346     --   --  133*   K 4.6  --  4.2 3.9     --   --  104   CO2 24  --   --  20*   BUN 9  --   --  10   CREATININE 0.9  --   --  0.9   CALCIUM 7.7*  --   --  7.9*   MG 2.3 2.2  --  1.9   PHOS 3.4 3.3  --  2.7       All pertinent labs within the past 24 hours have been reviewed.    Significant Imaging:  CXR pending

## 2020-01-07 NOTE — PLAN OF CARE
CM is at bedside performing discharge planning assessment; patient is sitting in recliner along side bed answering questions; family present.  Patient states that he was independent prior to this hospitalization.  No discharge needs noted at this time.   My health packet given, after informed of it.  Patient verbalized understanding.        Polo Osman Jr, MD  [address]  / [address] [address] [id number]      [facility] - [place], [place] - [address]  [address]  [place] [address]  Phone: [phone] Fax: [phone]      Extended Emergency Contact Information  Primary Emergency Contact: shadi osei  Address: [address] [place] [address] [id number] [place] [address]  Mobile Phone: [phone]  Relation: Brother  Preferred language: English   needed? No  Secondary Emergency Contact: Diana Maldonado  Address: [address] [place] [address]  Mobile Phone: [phone]  Relation: Sister    Payor: [place] [employer] / Plan: [facility] CHOICE PLUS / Product Type: Commercial /     Future Appointments   Date Time Provider Department Center   2/3/2020  7:00 AM LAB, [facility] LAB [facility]   2/4/2020 10:00 AM EKG, [facility] EKG [facility]   2/4/2020 10:15 AM [facility] [id number] LB LIMIT [facility] XRAY OP [facility]   2/4/2020 10:45 AM [facility]          01/07/20 1005   Discharge Assessment   Assessment Type Discharge Planning Assessment   Confirmed/corrected address and phone number on facesheet? Yes   Assessment information obtained from? Patient   Prior to hospitilization cognitive status: Alert/Oriented   Prior to hospitalization functional status: Independent   Current cognitive status: Alert/Oriented   Current Functional Status:   (unable to assess)   Lives With alone   Able to Return to Prior Arrangements yes   Is patient able to care for self after discharge? Unable to determine at this  time (comments)   Who are your caregiver(s) and their phone number(s)? Dominguez Gee (brother)- [phone]/ Diana Maldonado (sister)- [phone]   Patient's perception of discharge disposition home or selfcare   Readmission Within the Last 30 Days no previous admission in last 30 days   Patient currently being followed by outpatient case management? No   Patient currently receives any other outside agency services? No   Equipment Currently Used at Home none   Do you have any problems affording any of your prescribed medications? No   Is the patient taking medications as prescribed? yes   Does the patient have transportation home? Yes   Transportation Anticipated family or friend will provide   Dialysis Name and Scheduled days N/A   Does the patient receive services at the [facility]? No   Discharge Plan A Home with family   Discharge Plan B Home Health   DME Needed Upon Discharge  other (see comments)  (TBD)   Patient/Family in Agreement with Plan yes       Lori Zhang MPH, RN, CM  Ext. [phone]

## 2020-01-07 NOTE — ANESTHESIA POSTPROCEDURE EVALUATION
Anesthesia Post Evaluation    Patient: Guillermo Gee    Procedure(s) Performed: Procedure(s) (LRB):  REPLACEMENT, AORTA, ASCENDING (N/A)    Final Anesthesia Type: general    Patient location during evaluation: ICU  Patient participation: Yes- Able to Participate  Level of consciousness: awake and alert  Post-procedure vital signs: reviewed and stable  Pain management: adequate  Airway patency: patent    PONV status at discharge: No PONV  Anesthetic complications: no      Cardiovascular status: blood pressure returned to baseline and hemodynamically stable  Respiratory status: unassisted, spontaneous ventilation and room air  Hydration status: euvolemic  Follow-up not needed.          Vitals Value Taken Time   /54 1/7/2020  6:02 AM   Temp 38.7 °C (101.7 °F) 1/7/2020  3:00 AM   Pulse 84 1/7/2020  6:18 AM   Resp 19 1/7/2020  6:18 AM   SpO2 92 % 1/7/2020  6:18 AM   Vitals shown include unvalidated device data.      No case tracking events are documented in the log.      Pain/Ynes Score: Pain Rating Prior to Med Admin: 4 (1/7/2020 12:59 AM)  Pain Rating Post Med Admin: 0 (1/6/2020  9:04 PM)

## 2020-01-07 NOTE — PT/OT/SLP EVAL
"Occupational Therapy   Evaluation    Name: Guillermo Gee  MRN: [id number]  Admitting Diagnosis:  Ascending aortic aneurysm 1 Day Post-Op     Pre-op Diagnosis: Thoracic aortic aneurysm without rupture     Procedure(s):  REPLACEMENT, AORTA, ASCENDING 1/6/2020    Recommendations:     Discharge Recommendations: home  Discharge Equipment Recommendations:  none  Barriers to discharge:  None    Assessment:     Guillermo Gee is a 59 y.o. male with a medical diagnosis of Ascending aortic aneurysm.  He presents with decline in ADLs and functional mobility due to pain and orthopedic precautions. He tolerated evaluation well, pain is well controlled and pt is moving near baseline. Performance deficits affecting function: weakness, impaired self care skills, impaired functional mobilty, impaired endurance, decreased lower extremity function, decreased safety awareness, decreased upper extremity function, pain, orthopedic precautions, impaired skin, impaired cardiopulmonary response to activity.      Rehab Prognosis: Good; patient would benefit from acute skilled OT services to address these deficits and reach maximum level of function.       Plan:     Patient to be seen 4 x/week to address the above listed problems via self-care/home management, therapeutic activities, therapeutic exercises  · Plan of Care Expires: 02/06/20  · Plan of Care Reviewed with: patient    Subjective     "It really hit me this morning." Pt discussed how pain set in this morning after block wore off.    Chief Complaint: no complaints  Patient/Family Comments/goals: Pt agreeable to progressing towards independence with ADLs and functional mobility.     Occupational Profile:  Living Environment: Lives alone, has a girlfriend and 2 daughters that can assist upon return home. Pt was working prior to admission as a claims  for [employer]  Previous level of function: Independent with self care and functional mobilty  Roles and Routines: father, " boyfriend and employee  Equipment Used at Home:  none  Assistance upon Discharge: family can assist upon d/c    Pain/Comfort:  · Pain Rating 1: 0/10  · Pain Rating Post-Intervention 2: 0/10    Patients cultural, spiritual, Restoration conflicts given the current situation: no    Objective:     Communicated with: RN prior to session.  Patient found up in chair with chest tube, oxygen, diaz catheter, telemetry, pulse ox (continuous), peripheral IV, arterial line, blood pressure cuff, central line upon OT entry to room.    General Precautions: Standard, sternal   Orthopedic Precautions:N/A   Braces: N/A     Occupational Performance:    Bed Mobility:    · Not tested, pt already in chair    Functional Mobility/Transfers:  · Patient completed Sit <> Stand Transfer with stand by assistance  with  no assistive device   · Functional Mobility: Pt completed sit to stand to complete oral care in standing position.      Activities of Daily Living:  · Feeding:  independence    · Grooming: stand by assistance for oral care in standing position  · Bathing: not tested secondary to lines    · Upper Body Dressing: DNT 2* to lines, educated on compensatory UB dressing technique    · Lower Body Dressing: DNT 2* to chest tube/diaz- educated on importance of dressing in sitting position to prevent fall on outstretched hands. Educated on compensatory dressing technique    · Toileting: educated pt on compensatory strategy for cole hygiene s/p sternotomy      Cognitive/Visual Perceptual:  Cognitive/Psychosocial Skills:     -       Follows Commands/attention:Follows multistep  commands  -       Safety awareness/insight to disability: intact   -       Mood/Affect/Coping skills/emotional control: Appropriate to situation, Cooperative and Pleasant  Visual/Perceptual:      -Intact      Physical Exam:  Balance: -       good  Skin integrity: Wound sternal  Upper Extremity Range of Motion:     -       Right Upper Extremity: WFL  -       Left Upper  Extremity: WFL  Fine Motor Coordination:    -       Intact  Neurological: -       intact    AMPAC 6 Click ADL:  AMPAC Total Score: 19    Treatment & Education:  · Pt educated on role of OT in acute care setting.   · Assisted with ADLs and functional mobility with assist levels noted above  · Educated pt and family on sternal precautions multiple times throughout session.  Education:    Patient left up in chair with all lines intact and call button in reach    GOALS:   Multidisciplinary Problems     Occupational Therapy Goals        Problem: Occupational Therapy Goal    Goal Priority Disciplines Outcome Interventions   Occupational Therapy Goal     OT, PT/OT Ongoing, Progressing    Description:  Goals to be met by:  1/14/2020    Patient will increase functional independence with ADLs by performing:    UE Dressing with [place].  LE Dressing with [place].  Grooming while standing with [place].  Toileting from toilet with Supervision for hygiene and clothing management.   Bathing from  standing at sink with Stand-by Assistance.  Toilet transfer to toilet with Supervision.  Pt will teachback 3/3 sternal precautions with independence.                    History:     Past Medical History:   Diagnosis Date    CAD (coronary artery disease) 12/19/2019    Diverticulosis     Family history of prostate cancer in father     Kidney stones        Past Surgical History:   Procedure Laterality Date    INGUINAL HERNIA REPAIR Left     LEFT HEART CATHETERIZATION Left 12/19/2019    Procedure: CATHETERIZATION, HEART, LEFT;  Surgeon: John Kyle MD;  Location: [facility] CATH LAB;  Service: Cardiology;  Laterality: Left;    REPLACEMENT OF ASCENDING AORTA N/A 1/6/2020    Procedure: REPLACEMENT, AORTA, ASCENDING;  Surgeon: Allen Baez MD;  Location: [facility] OR [address] [place] [address];  Service: Cardiothoracic;  Laterality: N/A;    SHOULDER ARTHROSCOPY W/ ROTATOR CUFF REPAIR Bilateral 2011, 2012    UMBILICAL HERNIA REPAIR          Time Tracking:     OT Date of Treatment: 01/07/20  OT Start Time: 1333  OT Stop Time: 1352  OT Total Time (min): 19 min    Billable Minutes:Evaluation 10  Self Care/Home Management 9    HIPOLITO Barros  1/7/2020

## 2020-01-07 NOTE — PROGRESS NOTES
[facility]  Critical Care - Surgery  Progress Note    Patient Name: Guillermo Gee  MRN: [id number]  Admission Date: 1/6/2020  Hospital Length of Stay: 1 days  Code Status: Full Code  Attending Provider: Allen Baez MD  Primary Care Provider: Polo Osman Jr, MD   Principal Problem: Ascending aortic aneurysm    Subjective:     Hospital/ICU Course:  No notes on file    Interval History/Significant Events:     Patient hypotensive overnight requiring epinephrine and 1500cc of albumin.  Patient remains alert and oriented in no acute distress.  Pain controlled.     Follow-up For: Procedure(s) (LRB):  REPLACEMENT, AORTA, ASCENDING (N/A)    Post-Operative Day: 1 Day Post-Op    Objective:     Vital Signs (Most Recent):  Temp: (!) 101.7 °F (38.7 °C) (01/07/20 0300)  Pulse: 86 (01/07/20 0600)  Resp: (!) 24 (01/07/20 0600)  BP: (!) 100/53 (01/07/20 0600)  SpO2: (!) 92 % (01/07/20 0600) Vital Signs (24h Range):  Temp:  [98 °F (36.7 °C)-101.7 °F (38.7 °C)] 101.7 °F (38.7 °C)  Pulse:  [58-86] 86  Resp:  [6-36] 24  SpO2:  [90 %-100 %] 92 %  BP: ()/(50-69) 100/53  Arterial Line BP: ()/(32-64) 85/33     Weight: 91 kg (200 lb 9.9 oz)  Body mass index is 28.79 kg/m².      Intake/Output Summary (Last 24 hours) at 1/7/2020 0706  Last data filed at 1/7/2020 0600  Gross per 24 hour   Intake 4037.48 ml   Output 3011 ml   Net 1026.48 ml       Physical Exam   Constitutional: He appears well-developed.   HENT:   Head: Normocephalic.   Eyes: Pupils are equal, round, and reactive to light.   Neck:   RIJ quad lumen   Cardiovascular: Normal rate and regular rhythm.   Mediastinal drains x2, Pleural drain x1 in place to suction.  Sternotomy dressed with dressing C/D/I   Pulmonary/Chest: Effort normal and breath sounds normal. No respiratory distress.   Abdominal: Soft. He exhibits no distension.   Genitourinary:   Genitourinary Comments: Johnson Catheter in place   Musculoskeletal: Normal range of motion.    Neurological: He is alert.   Skin: Skin is warm and dry.   Nursing note and vitals reviewed.      Vents:       Lines/Drains/Airways     Central Venous Catheter Line                 Percutaneous Central Line Insertion/Assessment - Quad lumen  01/06/20 0735 less than 1 day         Percutaneous Central Line Insertion/Assessment - quad lumen  01/06/20 0735 less than 1 day          Drain                 Urethral Catheter 01/06/20 0730 Non-latex 16 Fr. less than 1 day         Y Chest Tube 1 and 2 01/06/20 1140 1 Anterior Mediastinal 19 Fr. 2 Anterior Mediastinal 19 Fr. less than 1 day          Arterial Line                 Arterial Line 01/06/20 0713 Left Radial less than 1 day          Peripheral Intravenous Line                 Peripheral IV - Single Lumen 01/06/20 0730 18 G Right Hand less than 1 day                Significant Labs:    CBC/Anemia Profile:  Recent Labs   Lab 01/06/20  1228  01/06/20  1409 01/06/20  1517 01/07/20  0346   WBC 14.40*  --   --   --  8.83   HGB 12.3*  --   --   --  11.6*   HCT 37.4*   < > 36 36 35.5*   *  --   --   --  133*   MCV 91  --   --   --  90   RDW 13.0  --   --   --  13.1    < > = values in this interval not displayed.        Chemistries:  Recent Labs   Lab 01/06/20  1228 01/06/20  1320 01/06/20  1957 01/07/20  0346     --   --  133*   K 4.6  --  4.2 3.9     --   --  104   CO2 24  --   --  20*   BUN 9  --   --  10   CREATININE 0.9  --   --  0.9   CALCIUM 7.7*  --   --  7.9*   MG 2.3 2.2  --  1.9   PHOS 3.4 3.3  --  2.7       All pertinent labs within the past 24 hours have been reviewed.    Significant Imaging:  CXR pending    Assessment/Plan:     * Ascending aortic aneurysm  59 y.o. male w/ a significant PMHx of hyperlipidemia, coronary artery disease, bicuspid aortic valve and thoracic aortic aneurysm 1 Day Post-Op post op from replacement of ascending aorta.  Patient arrived to SICU extubated off all vasopressor support.     Neuro:   - Oxycodone as needed for  pain  - Multimodal pain regimen    Pulmonary:   - Extubated  - Supplemental oxygen as needed  - ABGs PRN  - BTs, DuoNebs    Cardiac:   - Patient with normal biventricular function on intraoperative MARIANNE  - Increased pressor requirement overnight.  Wean as tolerated  - MAP goals of 60 to 80 with SBP < 110 mmgh  - Monitor chest tube output  - ASA  - Anticoagulation per CTS    Renal:    - Monitor UOP  - Trend BUN/Cr    ID:   - Afebrile  - No leukocytosis  - Trend WBC daily  - Complete perioperative antibiotics    Hem/Onc:   - H/H stable  - Transfuse per CTS    Endocrine:    - SSI  - Endocrine consulted, appreciate assistance    Fluids/Electrolytes/Nutrition/GI:   - Mildly hyponatremic, limit free water intake  - Replace electrolytes PRN  - mIVF per CTS  - CLD advance as tolerated  - Impact AR    PPx:  - DVT: SCD  - Bowel: Miralax, Colace  - GI: Protonix      DISPO:  - Continue SICU care            Critical care was time spent personally by me on the following activities: development of treatment plan with patient or surrogate and bedside caregivers, discussions with consultants, evaluation of patient's response to treatment, examination of patient, ordering and performing treatments and interventions, ordering and review of laboratory studies, ordering and review of radiographic studies, pulse oximetry, re-evaluation of patient's condition.  This critical care time did not overlap with that of any other provider or involve time for any procedures.     Keenan Edge MD  Critical Care - Surgery  [facility]

## 2020-01-07 NOTE — PLAN OF CARE
Problem: Physical Therapy Goal  Goal: Physical Therapy Goal  Description  Goals to be met by: 1/14/20       Patient will increase functional independence with mobility by performing (while maintaining sternal precautions):    1. Supine to sit with Modified [place].  2. Sit to supine with Modified [place].  3. Sit to stand transfer with Modified [place].  4. Bed to chair transfer with Modified [place].  5. Gait  x 200 feet with Supervision.  6. Ascend/descend 1 flight stair with Stand-by Assistance.  7. Stand for 3 minutes with Stand-by Assistance while performing a dynamic task.  8. Lower extremity exercise program x 20 reps per handout, with independence.     Outcome: Ongoing, Progressing  Goals set based on presentation today during evaluation and prior level of function.

## 2020-01-07 NOTE — ASSESSMENT & PLAN NOTE
59 y.o. male w/ a significant PMHx of hyperlipidemia, coronary artery disease, bicuspid aortic valve and thoracic aortic aneurysm 1 Day Post-Op post op from replacement of ascending aorta.  Patient arrived to SICU extubated off all vasopressor support.     Neuro:   - Oxycodone as needed for pain  - Multimodal pain regimen    Pulmonary:   - Extubated  - Supplemental oxygen as needed  - ABGs PRN  - BTs, DuoNebs    Cardiac:   - Patient with normal biventricular function on intraoperative MARIANNE  - Increased pressor requirement overnight.  Wean as tolerated  - MAP goals of 60 to 80 with SBP < 110 mmgh  - Monitor chest tube output  - ASA  - Anticoagulation per CTS    Renal:    - Monitor UOP  - Trend BUN/Cr    ID:   - Afebrile  - No leukocytosis  - Trend WBC daily  - Complete perioperative antibiotics    Hem/Onc:   - H/H stable  - Transfuse per CTS    Endocrine:    - SSI  - Endocrine consulted, appreciate assistance    Fluids/Electrolytes/Nutrition/GI:   - Mildly hyponatremic, limit free water intake  - Replace electrolytes PRN  - mIVF per CTS  - CLD advance as tolerated  - Impact AR    PPx:  - DVT: SCD  - Bowel: Miralax, Colace  - GI: Protonix      DISPO:  - Continue SICU care

## 2020-01-07 NOTE — PROGRESS NOTES
Patient on 2L, see prior note for new parameters on BP, all other VSS through shift. Epi at 0.08 mcg/kg/min. 1.5L albumin given overnight for low MAPs and marginal UOP. See flow sheet for chest tube output and urinary output. MDs updated through shift on relevant labs, labs replaced via PRNs. All questions answered by RN, will continue to monitor through shift.

## 2020-01-07 NOTE — PLAN OF CARE
Evaluation completed.  OT plan of care developed and reviewed with patient.     Problem: Occupational Therapy Goal  Goal: Occupational Therapy Goal  Description  Goals to be met by:  1/14/2020    Patient will increase functional independence with ADLs by performing:    UE Dressing with [place].  LE Dressing with [place].  Grooming while standing with [place].  Toileting from toilet with Supervision for hygiene and clothing management.   Bathing from  standing at sink with Stand-by Assistance.  Toilet transfer to toilet with Supervision.  Pt will teachback 3/3 sternal precautions with independence.   Outcome: Ongoing, Progressing     HIPOLITO Florez  1/7/2020  Rehab Services

## 2020-01-08 PROBLEM — Z95.828 HX OF ASCENDING AORTA REPLACEMENT: Status: ACTIVE | Noted: 2020-01-08

## 2020-01-08 LAB
ANION GAP SERPL CALC-SCNC: 6 MMOL/L (ref 8–16)
BASOPHILS # BLD AUTO: 0 K/UL (ref 0–0.2)
BASOPHILS NFR BLD: 0 % (ref 0–1.9)
BUN SERPL-MCNC: 13 MG/DL (ref 6–20)
CALCIUM SERPL-MCNC: 8 MG/DL (ref 8.7–10.5)
CHLORIDE SERPL-SCNC: 106 MMOL/L (ref 95–110)
CO2 SERPL-SCNC: 25 MMOL/L (ref 23–29)
CREAT SERPL-MCNC: 1 MG/DL (ref 0.5–1.4)
DIFFERENTIAL METHOD: ABNORMAL
EOSINOPHIL # BLD AUTO: 0 K/UL (ref 0–0.5)
EOSINOPHIL NFR BLD: 0.2 % (ref 0–8)
ERYTHROCYTE [DISTWIDTH] IN BLOOD BY AUTOMATED COUNT: 13.5 % (ref 11.5–14.5)
EST. GFR  (AFRICAN AMERICAN): >60 ML/MIN/1.73 M^2
EST. GFR  (NON AFRICAN AMERICAN): >60 ML/MIN/1.73 M^2
GLUCOSE SERPL-MCNC: 102 MG/DL (ref 70–110)
HCT VFR BLD AUTO: 32.8 % (ref 40–54)
HGB BLD-MCNC: 10.6 G/DL (ref 14–18)
IMM GRANULOCYTES # BLD AUTO: 0.04 K/UL (ref 0–0.04)
IMM GRANULOCYTES NFR BLD AUTO: 0.4 % (ref 0–0.5)
LYMPHOCYTES # BLD AUTO: 1.5 K/UL (ref 1–4.8)
LYMPHOCYTES NFR BLD: 16 % (ref 18–48)
MAGNESIUM SERPL-MCNC: 2.3 MG/DL (ref 1.6–2.6)
MCH RBC QN AUTO: 29.8 PG (ref 27–31)
MCHC RBC AUTO-ENTMCNC: 32.3 G/DL (ref 32–36)
MCV RBC AUTO: 92 FL (ref 82–98)
MONOCYTES # BLD AUTO: 1 K/UL (ref 0.3–1)
MONOCYTES NFR BLD: 10.5 % (ref 4–15)
NEUTROPHILS # BLD AUTO: 7 K/UL (ref 1.8–7.7)
NEUTROPHILS NFR BLD: 72.9 % (ref 38–73)
NRBC BLD-RTO: 0 /100 WBC
PHOSPHATE SERPL-MCNC: 1.5 MG/DL (ref 2.7–4.5)
PLATELET # BLD AUTO: 116 K/UL (ref 150–350)
PMV BLD AUTO: 10 FL (ref 9.2–12.9)
POCT GLUCOSE: 103 MG/DL (ref 70–110)
POCT GLUCOSE: 93 MG/DL (ref 70–110)
POCT GLUCOSE: 94 MG/DL (ref 70–110)
POTASSIUM SERPL-SCNC: 4.6 MMOL/L (ref 3.5–5.1)
POTASSIUM SERPL-SCNC: 4.6 MMOL/L (ref 3.5–5.1)
RBC # BLD AUTO: 3.56 M/UL (ref 4.6–6.2)
SODIUM SERPL-SCNC: 137 MMOL/L (ref 136–145)
WBC # BLD AUTO: 9.56 K/UL (ref 3.9–12.7)

## 2020-01-08 PROCEDURE — 80048 BASIC METABOLIC PNL TOTAL CA: CPT

## 2020-01-08 PROCEDURE — 99900035 HC TECH TIME PER 15 MIN (STAT)

## 2020-01-08 PROCEDURE — 84132 ASSAY OF SERUM POTASSIUM: CPT

## 2020-01-08 PROCEDURE — 25000003 PHARM REV CODE 250: Performed by: STUDENT IN AN ORGANIZED HEALTH CARE EDUCATION/TRAINING PROGRAM

## 2020-01-08 PROCEDURE — 99232 SBSQ HOSP IP/OBS MODERATE 35: CPT | Mod: ,,, | Performed by: SURGERY

## 2020-01-08 PROCEDURE — 94761 N-INVAS EAR/PLS OXIMETRY MLT: CPT

## 2020-01-08 PROCEDURE — 63600175 PHARM REV CODE 636 W HCPCS: Performed by: NURSE PRACTITIONER

## 2020-01-08 PROCEDURE — 83735 ASSAY OF MAGNESIUM: CPT

## 2020-01-08 PROCEDURE — 84100 ASSAY OF PHOSPHORUS: CPT

## 2020-01-08 PROCEDURE — 85025 COMPLETE CBC W/AUTO DIFF WBC: CPT

## 2020-01-08 PROCEDURE — 25000003 PHARM REV CODE 250: Performed by: NURSE PRACTITIONER

## 2020-01-08 PROCEDURE — 25000003 PHARM REV CODE 250: Performed by: THORACIC SURGERY (CARDIOTHORACIC VASCULAR SURGERY)

## 2020-01-08 PROCEDURE — 99232 PR SUBSEQUENT HOSPITAL CARE,LEVL II: ICD-10-PCS | Mod: ,,, | Performed by: SURGERY

## 2020-01-08 PROCEDURE — 20600001 HC STEP DOWN PRIVATE ROOM

## 2020-01-08 PROCEDURE — 27000221 HC OXYGEN, UP TO 24 HOURS

## 2020-01-08 RX ORDER — DIPHENHYDRAMINE HCL 25 MG
25 CAPSULE ORAL EVERY 12 HOURS PRN
Status: DISCONTINUED | OUTPATIENT
Start: 2020-01-08 | End: 2020-01-10

## 2020-01-08 RX ORDER — FUROSEMIDE 40 MG/1
40 TABLET ORAL 2 TIMES DAILY
Status: DISCONTINUED | OUTPATIENT
Start: 2020-01-08 | End: 2020-01-10 | Stop reason: HOSPADM

## 2020-01-08 RX ORDER — METOPROLOL TARTRATE 25 MG/1
12.5 TABLET ORAL 2 TIMES DAILY
Status: DISCONTINUED | OUTPATIENT
Start: 2020-01-08 | End: 2020-01-10 | Stop reason: HOSPADM

## 2020-01-08 RX ORDER — DIPHENHYDRAMINE HCL 25 MG
25 CAPSULE ORAL ONCE
Status: COMPLETED | OUTPATIENT
Start: 2020-01-08 | End: 2020-01-08

## 2020-01-08 RX ADMIN — FUROSEMIDE 40 MG: 40 TABLET ORAL at 09:01

## 2020-01-08 RX ADMIN — POLYETHYLENE GLYCOL 3350 17 G: 17 POWDER, FOR SOLUTION ORAL at 08:01

## 2020-01-08 RX ADMIN — DOCUSATE SODIUM 100 MG: 100 CAPSULE, LIQUID FILLED ORAL at 08:01

## 2020-01-08 RX ADMIN — SODIUM PHOSPHATE, MONOBASIC, MONOHYDRATE 30 MMOL: 276; 142 INJECTION, SOLUTION INTRAVENOUS at 04:01

## 2020-01-08 RX ADMIN — ACETAMINOPHEN 650 MG: 325 TABLET ORAL at 05:01

## 2020-01-08 RX ADMIN — MUPIROCIN 1 G: 20 OINTMENT TOPICAL at 09:01

## 2020-01-08 RX ADMIN — MUPIROCIN 1 G: 20 OINTMENT TOPICAL at 08:01

## 2020-01-08 RX ADMIN — METOPROLOL TARTRATE 12.5 MG: 25 TABLET, FILM COATED ORAL at 10:01

## 2020-01-08 RX ADMIN — METOPROLOL TARTRATE 12.5 MG: 25 TABLET, FILM COATED ORAL at 08:01

## 2020-01-08 RX ADMIN — ASPIRIN 325 MG ORAL TABLET 325 MG: 325 PILL ORAL at 08:01

## 2020-01-08 RX ADMIN — FUROSEMIDE 40 MG: 40 TABLET ORAL at 05:01

## 2020-01-08 RX ADMIN — OXYCODONE HYDROCHLORIDE 5 MG: 5 TABLET ORAL at 02:01

## 2020-01-08 RX ADMIN — PANTOPRAZOLE SODIUM 40 MG: 40 TABLET, DELAYED RELEASE ORAL at 08:01

## 2020-01-08 RX ADMIN — ACETAMINOPHEN 650 MG: 325 TABLET ORAL at 12:01

## 2020-01-08 RX ADMIN — OXYCODONE HYDROCHLORIDE 5 MG: 5 TABLET ORAL at 10:01

## 2020-01-08 RX ADMIN — OXYCODONE HYDROCHLORIDE 5 MG: 5 TABLET ORAL at 07:01

## 2020-01-08 RX ADMIN — DIPHENHYDRAMINE HYDROCHLORIDE 25 MG: 25 CAPSULE ORAL at 03:01

## 2020-01-08 RX ADMIN — OXYCODONE HYDROCHLORIDE 5 MG: 5 TABLET ORAL at 06:01

## 2020-01-08 RX ADMIN — DIPHENHYDRAMINE HYDROCHLORIDE 25 MG: 25 CAPSULE ORAL at 08:01

## 2020-01-08 NOTE — CARE UPDATE
BG goal 140-180     Remains in ICU, NAEON  BG below goal overnight  Plan:     Continue moderate dose correction scale  BG monitoring AC/HS     Endocrine to continue to follow for now but will likely sign off once patient tolerating diet with no insulin needs given no history of DM     ** Please call Endocrine for any BG related issues **

## 2020-01-08 NOTE — NURSING
Received report from MODE Lake RN, @ [phone].  Patient arrived to floor on bed and 2L nasal cannula. Placed on cardiac monitoring (TTX 0145) and called telemetry to add to continuous monitoring.  Called Dietary Services @ [phone], to transfer lunch meal to receiving unit.  Dextrose %5 and 0.45% NaCl with KCl+20 mEq infusing at 5 ml/hr.  Y chest tube 1/2 Mediastinal connected to wall suction at 40 mm Hg.  All dressings changed by MODE Lake, RN.  TESSA Mcqueen NP, notified of patient's arrival to CTSU.  Vitals and admit assessment completed.  Patient, daughters and and family oriented to room.  Will continue to monitor.

## 2020-01-08 NOTE — CARE UPDATE
Patient has no history of DM    Patient tolerating diet with no correction scale insulin needs  Discontinue BG monitoring, recommend monitoring glucose with daily labs    Endocrine to sign off, please re-consult if needed at any time

## 2020-01-08 NOTE — NURSING TRANSFER
Nursing Transfer Note      1/8/2020     Transfer To: [id number]    Transfer via wheelchair    Transfer with O2, cardiac monitoring    Transported by TESSA Lozano RN    Medicines sent: yes    Chart send with patient: Yes    Notified: Family at bedside    Patient reassessed at: 1/8/2020 11:00    Upon arrival to floor: cardiac monitor applied, patient oriented to room, call bell in reach and bed in lowest position

## 2020-01-08 NOTE — PROGRESS NOTES
[facility]  Critical Care - Surgery  Progress Note    Patient Name: Guillermo Gee  MRN: [id number]  Admission Date: 1/6/2020  Hospital Length of Stay: 2 days  Code Status: Full Code  Attending Provider: Allen Baez MD  Primary Care Provider: Polo Osman Jr, MD   Principal Problem: Ascending aortic aneurysm    Subjective:     Hospital/ICU Course:  No notes on file    Interval History/Significant Events: Pt did well overnight with no acute events. Pt remains alert and oriented with no acute distress.    Follow-up For: Procedure(s) (LRB):  REPLACEMENT, AORTA, ASCENDING (N/A)    Post-Operative Day: 2 Days Post-Op    Objective:     Vital Signs (Most Recent):  Temp: 98.9 °F (37.2 °C) (01/08/20 1100)  Pulse: 79 (01/08/20 1200)  Resp: (!) 34 (01/08/20 1200)  BP: (!) 104/56 (01/08/20 1200)  SpO2: (!) 92 % (01/08/20 1200) Vital Signs (24h Range):  Temp:  [98.2 °F (36.8 °C)-100.4 °F (38 °C)] 98.9 °F (37.2 °C)  Pulse:  [71-83] 79  Resp:  [10-36] 34  SpO2:  [88 %-95 %] 92 %  BP: ()/(55-75) 104/56  Arterial Line BP: ()/(2-87) 93/51     Weight: 91 kg (200 lb 9.9 oz)  Body mass index is 28.79 kg/m².      Intake/Output Summary (Last 24 hours) at 1/8/2020 1244  Last data filed at 1/8/2020 1200  Gross per 24 hour   Intake 952 ml   Output 2440 ml   Net -1488 ml       Physical Exam   Constitutional: He appears well-developed.   HENT:   Head: Normocephalic.   Eyes: Pupils are equal, round, and reactive to light.   Neck:   RIJ quad lumen   Cardiovascular: Normal rate and regular rhythm.     Sternotomy dressed with dressing C/D/I   Pulmonary/Chest: Effort normal and breath sounds normal. No respiratory distress.   Abdominal: Soft. He exhibits no distension.   Genitourinary:   Genitourinary Comments: Johnson Catheter removed   Musculoskeletal: Normal range of motion.   Neurological: He is alert.   Skin: Skin is warm and dry.   Nursing note and vitals reviewed.      Vents:       Lines/Drains/Airways      Drain                 Y Chest Tube 1 and 2 01/06/20 1140 1 Anterior Mediastinal 19 Fr. 2 Anterior Mediastinal 19 Fr. 2 days          Peripheral Intravenous Line                 Peripheral IV - Single Lumen 01/06/20 0730 18 G Left Hand 2 days         Peripheral IV - Single Lumen 01/06/20 0730 18 G Right Hand 2 days                Significant Labs:    CBC/Anemia Profile:  Recent Labs   Lab 01/06/20  1517 01/07/20  0346 01/08/20  0314   WBC  --  8.83 9.56   HGB  --  11.6* 10.6*   HCT 36 35.5* 32.8*   PLT  --  133* 116*   MCV  --  90 92   RDW  --  13.1 13.5        Chemistries:  Recent Labs   Lab 01/06/20  1320  01/07/20  0346  01/07/20  1141 01/07/20  2018 01/08/20  0314 01/08/20  0756   NA  --   --  133*  --  134*  --  137  --    K  --    < > 3.9   < > 4.0 3.9 4.6 4.6   CL  --   --  104  --  104  --  106  --    CO2  --   --  20*  --  21*  --  25  --    BUN  --   --  10  --  11  --  13  --    CREATININE  --   --  0.9  --  1.1  --  1.0  --    CALCIUM  --   --  7.9*  --  8.1*  --  8.0*  --    ALBUMIN  --   --   --   --  4.2  --   --   --    PROT  --   --   --   --  6.3  --   --   --    BILITOT  --   --   --   --  1.1*  --   --   --    ALKPHOS  --   --   --   --  32*  --   --   --    ALT  --   --   --   --  12  --   --   --    AST  --   --   --   --  27  --   --   --    MG 2.2  --  1.9  --   --   --  2.3  --    PHOS 3.3  --  2.7  --   --   --  1.5*  --     < > = values in this interval not displayed.         Assessment/Plan:     Ascending aortic aneurysm  59 y.o. male w/ a significant PMHx of hyperlipidemia, coronary artery disease, bicuspid aortic valve and thoracic aortic aneurysm 2 Day Post-Op post op from replacement of ascending aorta.  Patient arrived to SICU extubated off all vasopressor support.      Neuro:   - Oxycodone as needed for pain  - Multimodal pain regimen     Pulmonary:   - Extubated  - Pt on room air  - ABGs PRN  - BTs, Romelia     Cardiac:   - Patient with normal biventricular function on  intraoperative MARIANNE  - pressors weaned  - MAP goals of 60 to 80 with SBP < 110 mmgh  - starting metoprolol   - ASA  - Anticoagulation per CTS     Renal:    - Monitor UOP  - Trend BUN/Cr  - lasix 40 BID     ID:   - Afebrile  - No leukocytosis  - Trend WBC daily  - Complete perioperative antibiotics     Hem/Onc:   - H/H stable  - Transfuse per CTS     Endocrine:    - SSI  - Endocrine consulted, appreciate assistance     Fluids/Electrolytes/Nutrition/GI:   - Mildly hyponatremic yesterday, limited free water intake, Na WNL today  - Replace electrolytes PRN  - mIVF per CTS  - CLD advance as tolerated  - Impact AR     PPx:  - DVT: SCD  - Bowel: Miralax, Colace  - GI: Protonix        DISPO:  - stepdown to floor         Critical Care Daily Checklist:    A: Awake: RASS Goal/Actual Goal: RASS Goal: 0-->alert and calm  Actual: Thomason Agitation Sedation Scale (RASS): Alert and calm   B: Spontaneous Breathing Trial Performed?     C: SAT & SBT Coordinated?             D: Delirium: CAM-ICU Overall CAM-ICU: Negative   E: Early Mobility Performed? Yes   F: Feeding Goal:    Status:     Current Diet Order   Procedures    Diet Cardiac Fluid - 1500mL     Order Specific Question:   Fluid restriction:     Answer:   Fluid - 1500mL      AS: Analgesia/Sedation stable   T: Thromboembolic Prophylaxis SCDs   H: HOB > 300 Yes   U: Stress Ulcer Prophylaxis (if needed) protonix   G: Glucose Control stable   B: Bowel Function     I: Indwelling Catheter (Lines & Johnson) Necessity central line   D: De-escalation of Antimicrobials/Pharmacotherapies     Plan for the day/ETD     Code Status:  Family/Goals of Care: Full Code            Critical care was time spent personally by me on the following activities: development of treatment plan with patient or surrogate and bedside caregivers, discussions with consultants, evaluation of patient's response to treatment, examination of patient, ordering and performing treatments and interventions, ordering and  review of laboratory studies, ordering and review of radiographic studies, pulse oximetry, re-evaluation of patient's condition.  This critical care time did not overlap with that of any other provider or involve time for any procedures.     Wilver Chavarria MD  Critical Care - Surgery  [facility]

## 2020-01-08 NOTE — PLAN OF CARE
No acute events overnight. Pt aaox4 throughout shift. Pain controlled with oxycodone 5mg. No c/o nausea. HR 70-80s. MAP maintained within goal 60-80. O2 sat>92% on 1LNC. Meds chest tube output 10-50 cc serosanguinous q4h. 30-100cc UOP via diaz cath. Pt OOBTC with two stand by assist. All pt questions encouraged and answered.

## 2020-01-08 NOTE — NURSING
Pt OOBTC. Upon standing, pt noted to have bilateral swollen areas on lower neck/anterior shoulder area. Areas not noticeable when head is back and patient is in reclined/supine position. Left side noted to be larger. Pt states they are somewhat tender. Soft upon palpation. Dr Wagner notified. MD to come to bedside to assess. No new orders at this time.

## 2020-01-08 NOTE — PLAN OF CARE
CM notified that patient will step down to medicine floor today; CM remains available for questions or concerns.            Lori Zhang MPH, RN, CM  Ext. [phone]

## 2020-01-08 NOTE — PROGRESS NOTES
"   01/08/20 1708 01/08/20 1715   Vital Signs   Temp (!) 100.7 °F (38.2 °C)  --    Temp src Oral  --    Pulse 82  --    Heart Rate Source Monitor  --    Resp 18  --    SpO2 (!) 84 % (!) 92 %   Flow (L/min) 2 3   O2 Device (Oxygen Therapy) nasal cannula nasal cannula   /65  --    MAP (mmHg) 81  --    BP Location Right arm  --    BP Method Automatic  --    Patient Position Lying  --    Called [phone], Resident referred to CTSU NP.  Called [phone], no answer.  Called [phone], no answer.  Patient given Tylenol 650 mg 1712 hours.  Patient denies headache, but complains of "not feeling well."  "

## 2020-01-08 NOTE — SUBJECTIVE & OBJECTIVE
Interval History/Significant Events: Pt did well overnight with no acute events. Pt remains alert and oriented with no acute distress.    Follow-up For: Procedure(s) (LRB):  REPLACEMENT, AORTA, ASCENDING (N/A)    Post-Operative Day: 2 Days Post-Op    Objective:     Vital Signs (Most Recent):  Temp: 98.9 °F (37.2 °C) (01/08/20 1100)  Pulse: 79 (01/08/20 1200)  Resp: (!) 34 (01/08/20 1200)  BP: (!) 104/56 (01/08/20 1200)  SpO2: (!) 92 % (01/08/20 1200) Vital Signs (24h Range):  Temp:  [98.2 °F (36.8 °C)-100.4 °F (38 °C)] 98.9 °F (37.2 °C)  Pulse:  [71-83] 79  Resp:  [10-36] 34  SpO2:  [88 %-95 %] 92 %  BP: ()/(55-75) 104/56  Arterial Line BP: ()/(2-87) 93/51     Weight: 91 kg (200 lb 9.9 oz)  Body mass index is 28.79 kg/m².      Intake/Output Summary (Last 24 hours) at 1/8/2020 1244  Last data filed at 1/8/2020 1200  Gross per 24 hour   Intake 952 ml   Output 2440 ml   Net -1488 ml       Physical Exam   Constitutional: He appears well-developed.   HENT:   Head: Normocephalic.   Eyes: Pupils are equal, round, and reactive to light.   Neck:   RIJ quad lumen   Cardiovascular: Normal rate and regular rhythm.     Sternotomy dressed with dressing C/D/I   Pulmonary/Chest: Effort normal and breath sounds normal. No respiratory distress.   Abdominal: Soft. He exhibits no distension.   Genitourinary:   Genitourinary Comments: Johnson Catheter removed   Musculoskeletal: Normal range of motion.   Neurological: He is alert.   Skin: Skin is warm and dry.   Nursing note and vitals reviewed.      Vents:       Lines/Drains/Airways     Drain                 Y Chest Tube 1 and 2 01/06/20 1140 1 Anterior Mediastinal 19 Fr. 2 Anterior Mediastinal 19 Fr. 2 days          Peripheral Intravenous Line                 Peripheral IV - Single Lumen 01/06/20 0730 18 G Left Hand 2 days         Peripheral IV - Single Lumen 01/06/20 0730 18 G Right Hand 2 days                Significant Labs:    CBC/Anemia Profile:  Recent Labs   Lab  01/06/20  1517 01/07/20 0346 01/08/20 0314   WBC  --  8.83 9.56   HGB  --  11.6* 10.6*   HCT 36 35.5* 32.8*   PLT  --  133* 116*   MCV  --  90 92   RDW  --  13.1 13.5        Chemistries:  Recent Labs   Lab 01/06/20  1320  01/07/20  0346  01/07/20  1141 01/07/20 2018 01/08/20 0314 01/08/20  0756   NA  --   --  133*  --  134*  --  137  --    K  --    < > 3.9   < > 4.0 3.9 4.6 4.6   CL  --   --  104  --  104  --  106  --    CO2  --   --  20*  --  21*  --  25  --    BUN  --   --  10  --  11  --  13  --    CREATININE  --   --  0.9  --  1.1  --  1.0  --    CALCIUM  --   --  7.9*  --  8.1*  --  8.0*  --    ALBUMIN  --   --   --   --  4.2  --   --   --    PROT  --   --   --   --  6.3  --   --   --    BILITOT  --   --   --   --  1.1*  --   --   --    ALKPHOS  --   --   --   --  32*  --   --   --    ALT  --   --   --   --  12  --   --   --    AST  --   --   --   --  27  --   --   --    MG 2.2  --  1.9  --   --   --  2.3  --    PHOS 3.3  --  2.7  --   --   --  1.5*  --     < > = values in this interval not displayed.

## 2020-01-08 NOTE — ASSESSMENT & PLAN NOTE
59 y.o. male w/ a significant PMHx of hyperlipidemia, coronary artery disease, bicuspid aortic valve and thoracic aortic aneurysm 2 Day Post-Op post op from replacement of ascending aorta.  Patient arrived to SICU extubated off all vasopressor support.      Neuro:   - Oxycodone as needed for pain  - Multimodal pain regimen     Pulmonary:   - Extubated  - Pt on room air  - ABGs PRN  - BTs, DuoNebs     Cardiac:   - Patient with normal biventricular function on intraoperative MARIANNE  - pressors weaned  - MAP goals of 60 to 80 with SBP < 110 mmgh  - ASA  - Anticoagulation per CTS     Renal:    - Monitor UOP  - Trend BUN/Cr     ID:   - Afebrile  - No leukocytosis  - Trend WBC daily  - Complete perioperative antibiotics     Hem/Onc:   - H/H stable  - Transfuse per CTS     Endocrine:    - SSI  - Endocrine consulted, appreciate assistance     Fluids/Electrolytes/Nutrition/GI:   - Mildly hyponatremic yesterday, limited free water intake, Na WNL today  - Replace electrolytes PRN  - mIVF per CTS  - CLD advance as tolerated  - Impact AR     PPx:  - DVT: SCD  - Bowel: Miralax, Colace  - GI: Protonix        DISPO:  - stepdown to floor

## 2020-01-09 PROBLEM — D69.6 THROMBOCYTOPENIA, UNSPECIFIED: Status: ACTIVE | Noted: 2020-01-09

## 2020-01-09 PROBLEM — E83.39 HYPOPHOSPHATEMIA: Status: ACTIVE | Noted: 2020-01-09

## 2020-01-09 PROBLEM — D64.9 ANEMIA: Status: ACTIVE | Noted: 2020-01-09

## 2020-01-09 LAB
ANION GAP SERPL CALC-SCNC: 10 MMOL/L (ref 8–16)
BASOPHILS # BLD AUTO: 0.02 K/UL (ref 0–0.2)
BASOPHILS NFR BLD: 0.2 % (ref 0–1.9)
BUN SERPL-MCNC: 15 MG/DL (ref 6–20)
CALCIUM SERPL-MCNC: 8.8 MG/DL (ref 8.7–10.5)
CHLORIDE SERPL-SCNC: 101 MMOL/L (ref 95–110)
CO2 SERPL-SCNC: 26 MMOL/L (ref 23–29)
CREAT SERPL-MCNC: 1 MG/DL (ref 0.5–1.4)
DIFFERENTIAL METHOD: ABNORMAL
EOSINOPHIL # BLD AUTO: 0.1 K/UL (ref 0–0.5)
EOSINOPHIL NFR BLD: 1 % (ref 0–8)
ERYTHROCYTE [DISTWIDTH] IN BLOOD BY AUTOMATED COUNT: 13.3 % (ref 11.5–14.5)
EST. GFR  (AFRICAN AMERICAN): >60 ML/MIN/1.73 M^2
EST. GFR  (NON AFRICAN AMERICAN): >60 ML/MIN/1.73 M^2
GLUCOSE SERPL-MCNC: 94 MG/DL (ref 70–110)
HCT VFR BLD AUTO: 36.2 % (ref 40–54)
HGB BLD-MCNC: 11.5 G/DL (ref 14–18)
IMM GRANULOCYTES # BLD AUTO: 0.04 K/UL (ref 0–0.04)
IMM GRANULOCYTES NFR BLD AUTO: 0.4 % (ref 0–0.5)
LYMPHOCYTES # BLD AUTO: 2 K/UL (ref 1–4.8)
LYMPHOCYTES NFR BLD: 18.2 % (ref 18–48)
MAGNESIUM SERPL-MCNC: 2 MG/DL (ref 1.6–2.6)
MCH RBC QN AUTO: 29.4 PG (ref 27–31)
MCHC RBC AUTO-ENTMCNC: 31.8 G/DL (ref 32–36)
MCV RBC AUTO: 93 FL (ref 82–98)
MONOCYTES # BLD AUTO: 1.2 K/UL (ref 0.3–1)
MONOCYTES NFR BLD: 10.8 % (ref 4–15)
NEUTROPHILS # BLD AUTO: 7.5 K/UL (ref 1.8–7.7)
NEUTROPHILS NFR BLD: 69.4 % (ref 38–73)
NRBC BLD-RTO: 0 /100 WBC
PHOSPHATE SERPL-MCNC: 1.9 MG/DL (ref 2.7–4.5)
PLATELET # BLD AUTO: 138 K/UL (ref 150–350)
PMV BLD AUTO: 10.8 FL (ref 9.2–12.9)
POTASSIUM SERPL-SCNC: 3.9 MMOL/L (ref 3.5–5.1)
RBC # BLD AUTO: 3.91 M/UL (ref 4.6–6.2)
SODIUM SERPL-SCNC: 137 MMOL/L (ref 136–145)
WBC # BLD AUTO: 10.85 K/UL (ref 3.9–12.7)

## 2020-01-09 PROCEDURE — 80048 BASIC METABOLIC PNL TOTAL CA: CPT

## 2020-01-09 PROCEDURE — 25000003 PHARM REV CODE 250: Performed by: THORACIC SURGERY (CARDIOTHORACIC VASCULAR SURGERY)

## 2020-01-09 PROCEDURE — 25000003 PHARM REV CODE 250: Performed by: NURSE PRACTITIONER

## 2020-01-09 PROCEDURE — 83735 ASSAY OF MAGNESIUM: CPT

## 2020-01-09 PROCEDURE — 85025 COMPLETE CBC W/AUTO DIFF WBC: CPT

## 2020-01-09 PROCEDURE — 63600175 PHARM REV CODE 636 W HCPCS: Performed by: NURSE PRACTITIONER

## 2020-01-09 PROCEDURE — 20600001 HC STEP DOWN PRIVATE ROOM

## 2020-01-09 PROCEDURE — 25000003 PHARM REV CODE 250: Performed by: STUDENT IN AN ORGANIZED HEALTH CARE EDUCATION/TRAINING PROGRAM

## 2020-01-09 PROCEDURE — 97116 GAIT TRAINING THERAPY: CPT

## 2020-01-09 PROCEDURE — 36415 COLL VENOUS BLD VENIPUNCTURE: CPT

## 2020-01-09 PROCEDURE — 84100 ASSAY OF PHOSPHORUS: CPT

## 2020-01-09 RX ORDER — ACETAMINOPHEN 325 MG/1
650 TABLET ORAL EVERY 4 HOURS PRN
Status: DISCONTINUED | OUTPATIENT
Start: 2020-01-09 | End: 2020-01-10 | Stop reason: HOSPADM

## 2020-01-09 RX ADMIN — METOPROLOL TARTRATE 12.5 MG: 25 TABLET, FILM COATED ORAL at 09:01

## 2020-01-09 RX ADMIN — ACETAMINOPHEN 650 MG: 325 TABLET ORAL at 12:01

## 2020-01-09 RX ADMIN — MUPIROCIN 1 G: 20 OINTMENT TOPICAL at 09:01

## 2020-01-09 RX ADMIN — POLYETHYLENE GLYCOL 3350 17 G: 17 POWDER, FOR SOLUTION ORAL at 09:01

## 2020-01-09 RX ADMIN — OXYCODONE HYDROCHLORIDE 5 MG: 5 TABLET ORAL at 05:01

## 2020-01-09 RX ADMIN — ACETAMINOPHEN 650 MG: 325 TABLET ORAL at 07:01

## 2020-01-09 RX ADMIN — DOCUSATE SODIUM 100 MG: 100 CAPSULE, LIQUID FILLED ORAL at 09:01

## 2020-01-09 RX ADMIN — SODIUM PHOSPHATE, MONOBASIC, MONOHYDRATE 39.99 MMOL: 276; 142 INJECTION, SOLUTION INTRAVENOUS at 09:01

## 2020-01-09 RX ADMIN — DIPHENHYDRAMINE HYDROCHLORIDE 25 MG: 25 CAPSULE ORAL at 07:01

## 2020-01-09 RX ADMIN — FUROSEMIDE 40 MG: 40 TABLET ORAL at 09:01

## 2020-01-09 RX ADMIN — ASPIRIN 325 MG ORAL TABLET 325 MG: 325 PILL ORAL at 09:01

## 2020-01-09 RX ADMIN — PANTOPRAZOLE SODIUM 40 MG: 40 TABLET, DELAYED RELEASE ORAL at 09:01

## 2020-01-09 RX ADMIN — FUROSEMIDE 40 MG: 40 TABLET ORAL at 05:01

## 2020-01-09 RX ADMIN — ACETAMINOPHEN 650 MG: 325 TABLET ORAL at 11:01

## 2020-01-09 RX ADMIN — OXYCODONE HYDROCHLORIDE 5 MG: 5 TABLET ORAL at 08:01

## 2020-01-09 NOTE — SUBJECTIVE & OBJECTIVE
Interval History: NAEON. Pt was transferred yesterday from ICU. Chest tubes and pacer wires removed. PT states he can breath better after removal     Review of Systems   Constitution: Negative for malaise/fatigue.   Cardiovascular: Negative for chest pain, claudication, dyspnea on exertion, irregular heartbeat, leg swelling and palpitations.   Respiratory: Negative for cough and shortness of breath.    Hematologic/Lymphatic: Negative for bleeding problem.   Gastrointestinal: Negative for abdominal pain.   Genitourinary: Negative for dysuria.   Neurological: Negative for headaches and weakness.     Medications:  Continuous Infusions:  Scheduled Meds:   aspirin  325 mg Oral Daily    atorvastatin  10 mg Oral QHS    docusate sodium  100 mg Oral BID    furosemide  40 mg Oral BID    metoprolol tartrate  12.5 mg Oral BID    mupirocin  1 g Nasal BID    pantoprazole  40 mg Oral Daily    polyethylene glycol  17 g Oral Daily    sodium phosphate IVPB  39.99 mmol Intravenous Once     PRN Meds:acetaminophen, aspirin, bisacodyl, Dextrose 10% Bolus, Dextrose 10% Bolus, diphenhydrAMINE, metoclopramide HCl, ondansetron, oxyCODONE, oxyCODONE, sodium chloride 0.9%     Objective:     Vital Signs (Most Recent):  Temp: 98.7 °F (37.1 °C) (01/09/20 0430)  Pulse: 71 (01/09/20 0724)  Resp: 18 (01/09/20 0430)  BP: 108/61 (01/09/20 0430)  SpO2: 96 % (01/09/20 0430) Vital Signs (24h Range):  Temp:  [98.5 °F (36.9 °C)-100.7 °F (38.2 °C)] 98.7 °F (37.1 °C)  Pulse:  [68-84] 71  Resp:  [18-34] 18  SpO2:  [84 %-96 %] 96 %  BP: (102-116)/(56-69) 108/61     Weight: 97 kg (213 lb 13.5 oz)  Body mass index is 30.68 kg/m².    SpO2: 96 %  O2 Device (Oxygen Therapy): room air    Intake/Output - Last 3 Shifts       01/07 0700 - 01/08 0659 01/08 0700 - 01/09 0659 01/09 0700 - 01/10 0659    P.O.  1290 360    I.V. (mL/kg) 552 (6.1)      Blood       Total Intake(mL/kg) 552 (6.1) 1290 (13.3) 360 (3.7)    Urine (mL/kg/hr) 2115 (1) 2700 (1.2)     Chest  Tube 220 62     Total Output 7143 4382     Net -1783 -5042 +360                 Lines/Drains/Airways     Drain                 Y Chest Tube 1 and 2 01/06/20 1140 1 Anterior Mediastinal 19 Fr. 2 Anterior Mediastinal 19 Fr. 2 days          Peripheral Intravenous Line                 Peripheral IV - Single Lumen 01/06/20 0730 18 G Left Hand 3 days         Peripheral IV - Single Lumen 01/06/20 0730 18 G Right Hand 3 days                Physical Exam   Constitutional: He is oriented to person, place, and time. He appears well-developed and well-nourished.   Cardiovascular: Normal rate, regular rhythm and normal heart sounds.   Pulmonary/Chest: Effort normal and breath sounds normal.   Abdominal: Soft.   Musculoskeletal: Normal range of motion.   Neurological: He is alert and oriented to person, place, and time.   Skin: Skin is warm, dry and intact.   Sternal Incision CDI   Psychiatric: He has a normal mood and affect.       Significant Labs:  BMP:   Recent Labs   Lab 01/09/20  0437   GLU 94      K 3.9      CO2 26   BUN 15   CREATININE 1.0   CALCIUM 8.8   MG 2.0     CBC:   Recent Labs   Lab 01/09/20  0437   WBC 10.85   RBC 3.91*   HGB 11.5*   HCT 36.2*   *   MCV 93   MCH 29.4   MCHC 31.8*       Significant Diagnostics:  I have reviewed all pertinent imaging results/findings within the past 24 hours.

## 2020-01-09 NOTE — PT/OT/SLP PROGRESS
Occupational Therapy      Patient Name:  Guillermo Gee   MRN:  [id number]    Patient not seen today secondary to Unavailable (Comment)(1st attempt w/ MD, 2nd Attempt w/ .). Will follow-up tomorrow.    Bernardino Calix, OT  1/9/2020

## 2020-01-09 NOTE — NURSING
Patient walked the CTSU x 4 without complaints of SOB today and yesterday.  Sternal precautions maintained.

## 2020-01-09 NOTE — PT/OT/SLP PROGRESS
"Physical Therapy Treatment    Patient Name:  Guillermo Gee   MRN:  [id number]  Admitting Diagnosis:  Hx of ascending aorta replacement   Recent Surgery: Procedure(s) (LRB):  REPLACEMENT, AORTA, ASCENDING (N/A) 3 Days Post-Op  Admit Date: 1/6/2020  Length of Stay: 3 days    Recommendations:     Discharge Recommendations:  home   Discharge Equipment Recommendations: none   Barriers to discharge: None    Assessment:     Guillermo Gee is a 59 y.o. male admitted with a medical diagnosis of Hx of ascending aorta replacement.  He presents with the following impairments/functional limitations:  impaired cardiopulmonary response to activity, impaired endurance. Pt tolerated session well today. Pt demonstrating good improvements in endurance and functional mobility as seen by increase in distance ambulated and decrease in level of assist provided. Pt was able to ascend/descend 10 steps on this date with SBA with no instances of unsteadiness or LOB. Patient and RN both report compliance with pt ambulating 4x/day and pt has been ambulating approx 500 ft each time. At this time pt has reached a high level of function and no longer qualifies for acute PT services. Pt instructed to continue to ambulate often throughout the day and remain OOB during daylight hours. Pt stated understanding. Pt in agreement with plan. Please re-consult PT if a decline in function is noted.    Rehab Prognosis: Good; patient would benefit from acute skilled PT services to address these deficits and reach maximum level of function.    Recent Surgery: Procedure(s) (LRB):  REPLACEMENT, AORTA, ASCENDING (N/A) 3 Days Post-Op    Plan:     During this hospitalization, patient no longer requires acute Physical Therapy services. Please re-consult if situation changes.    Subjective     RN notified prior to session. Daughter present upon PT entrance into room.    Chief Complaint: "Do I get a gold star for going up the stairs!?"  Patient/Family Comments/goals: be " able to go to master bedroom/bathroom on the 2nd floor of his house  Pain/Comfort:  · Pain Rating 1: 0/10  · Pain Rating Post-Intervention 1: 0/10      Objective:     Additional staff present: none    Patient found up in chair with: telemetry, peripheral IV   Mental Status: Patient is oriented to AxOx4 and follows multi-step commands. Patient is Alert and Cooperative during session.    General Precautions: Standard, Cardiac fall, sternal   Orthopedic Precautions:N/A   Braces: N/A   Body mass index is 30.68 kg/m².  Oxygen Device: Nasal Cannula 3L  Pt stated he did not need the supplemental O2 and was wearing for comfort at this time. Pt removed nc during ambulation.       Outcome Measures:  AM-PAC 6 CLICK MOBILITY  Turning over in bed (including adjusting bedclothes, sheets and blankets)?: 3  Sitting down on and standing up from a chair with arms (e.g., wheelchair, bedside commode, etc.): 4  Moving from lying on back to sitting on the side of the bed?: 3  Moving to and from a bed to a chair (including a wheelchair)?: 4  Need to walk in hospital room?: 4  Climbing 3-5 steps with a railing?: 3  Basic Mobility Total Score: 21     Functional Mobility:    Bed Mobility:   · Pt found/returned to bedside chair    Transfers:   · Sit <> Stand Transfer: modified independence with no assistive device   · Stand <> Sit Transfer: modified independence with no assistive device   · [id number] from bedside chair   · Mod I for increased time    Standing Balance:   Assistance Level Required: Modified [place]   Patient used: no assistive device       Gait:  · Patient ambulated: 300 ft   · Patient required: supervision  · Patient used:  no assistive device   · Gait Pattern observed: reciprocal gait  · Gait Deviation(s): wide base of support, reciprocal arm swing and steady gait  · all lines remained intact throughout ambulation trial  · Comments: Pt was able to perform vertical/horizontal head turns, 180 degree turns while  ambulating, and avoid hallway obstacles without LOB.    Stairs:   Pt ascended/descended 10 stair(s) with No Assistive Device with handrails prn for balance only with Stand-by Assistance.     Education:   Time provided for education, counseling and discussion of health disposition in regards to patient's current status   All questions answered within PT scope of practice and to patient's satisfaction   PT role in POC to address current functional deficits   Pt educated on proper body mechanics, safety techniques, and energy conservation with PT facilitation and cueing throughout session   Whiteboard updated with pt's current mobility status documented above   Safe to perform step transfer to/from chair/bedside commode supervision and no AD   Pt to ambulate 4x/day supervision and no AD with nsg/family/independence in order to maintain functional mobility   Importance of OOB tolerance 8-10 hrs/day to improve lung ventilation and expansion as well as strengthen postural musculature   Sternal Precautions: patient able to voice 1/3 (pushing) precautions without assistance. Pt able to name other precautions with cueing. Patient able to maintain precautions throughout session with no verbal cues.    Patient left up in chair with all lines intact, call button in reach and daughter present.    GOALS:   Multidisciplinary Problems     Physical Therapy Goals     Not on file          Multidisciplinary Problems (Resolved)        Problem: Physical Therapy Goal    Goal Priority Disciplines Outcome Goal Variances Interventions   Physical Therapy Goal   (Resolved)     PT, PT/OT Met     Description:  Goals to be met by: 1/14/20       Patient will increase functional independence with mobility by performing (while maintaining sternal precautions):    1. Supine to sit with Modified [place]. - not met  2. Sit to supine with Modified [place]. - not met  3. Sit to stand transfer with Modified [place]. - met  1/9/2020  4. Bed to chair transfer with Modified [place]. - not met  5. Gait  x 200 feet with Supervision. -  Met 1/9/2020  6. Ascend/descend 1 flight stair with Stand-by Assistance. - met 1/9/2020  7. Stand for 3 minutes with Stand-by Assistance while performing a dynamic task. - not met  8. Lower extremity exercise program x 20 reps per handout, with independence. - not met                     Time Tracking:     PT Received On: 01/09/20  PT Start Time: 0950     PT Stop Time: 1005  PT Total Time (min): 15 min       Billable Minutes:   · Gait Training 15    Treatment Type: Treatment  PT/PTA: PT       Maddie Santos PT, DPT  1/9/2020  Pager: [phone]

## 2020-01-09 NOTE — PLAN OF CARE
POC reviewed with pt and family at bedside. Stepped down today from ICU. POD 3. 2 Med CT Y sitted in place. No pacer wires. MSI covered by [place] dressing. MAP goal kept between 60-80. VSS. On 2 L NC. NSR on monitor. PRN pain med given. Benedryl added to PRN orders for itching caused by oxy. No complaints at this time. Pt requesting his Atorvastatin and Fluid restriction be removed from his POC. Will continue to monitor.

## 2020-01-09 NOTE — HPI
Mr. Gee is a 59-year-old gentleman who initially had a CT   calcium score done as part of a routine physical exam.  This study demonstrated   an ascending aortic aneurysm that was roughly 5.5 cm in diameter.  Based on this   finding, he underwent a complete evaluation, which included coronary   angiography as well as echocardiography.  The angiogram failed to demonstrate   any hemodynamically significant lesions.  The echo demonstrated a bicuspid   aortic valve with normal function.  He now presents for ascending aortic   replacement.

## 2020-01-09 NOTE — PLAN OF CARE
Discharge Recommendation: Home, no PT needs.    2 goals met today. Pt functioning at high level and no longer requires acute PT services. D/c from PT services on this date.    Maddie Santos, PT, DPT  1/9/2020  Pager: [phone]    Problem: Physical Therapy Goal  Goal: Physical Therapy Goal  Description  Goals to be met by: 1/14/20       Patient will increase functional independence with mobility by performing (while maintaining sternal precautions):    1. Supine to sit with Modified [place]. - not met  2. Sit to supine with Modified [place]. - not met  3. Sit to stand transfer with Modified [place]. - met 1/9/2020  4. Bed to chair transfer with Modified [place]. - not met  5. Gait  x 200 feet with Supervision. -  Met 1/9/2020  6. Ascend/descend 1 flight stair with Stand-by Assistance. - met 1/9/2020  7. Stand for 3 minutes with Stand-by Assistance while performing a dynamic task. - not met  8. Lower extremity exercise program x 20 reps per handout, with independence. - not met      Outcome: Met

## 2020-01-09 NOTE — PLAN OF CARE
Reviewed plan of care with very pleasant patient.  Patient is alert, oriented x 4, independent, ambulatory, very self-motivated, and runs NSR on the monitor.  Plan is to discharge home tomorrow.  Lasix 40 mg oral BID.  K+ =3.9.  Mg+ = 2.0 .  Sodium Phosphate 39.99 mmol/250 ml IVPB x 1 dose given.  CT removed, dressing changed.  MSI cleaned with soap and water, open to air, steri-strips intact.  Last BM = 1/6/2019.  Patient reports GI symptoms (flatus/belching).  Patient refused suppository.  Fluid restriction:  1500 ml/d.  Daily weights.  Patient has remained free of falls/trauma/injury.  Patient verbalized understanding of all instructions.

## 2020-01-09 NOTE — NURSING
Patient requested fluid restriction be removed, and Lipitor be stopped.  TESSA Mcqueen NP, notified and denied both requests.  Patient notified and explanation provided.  Patient okay with decision at this time.

## 2020-01-09 NOTE — NURSING
Patient requests to take a shower.  TESSA Mcqueen NP, notified and approved.  Patient will be prepared for shower shortly.

## 2020-01-09 NOTE — PT/OT/SLP DISCHARGE
Physical Therapy Discharge Summary    Name: Guillermo Gee  MRN: [id number]   Principal Problem: Hx of ascending aorta replacement     Patient Discharged from acute Physical Therapy on 01/9/2020.  Please refer to prior PT noted date on 01/09/2020 for functional status.     Assessment:     Patient is functioning at a high level and has met most goals.    Objective:     GOALS:   Multidisciplinary Problems     Physical Therapy Goals     Not on file          Multidisciplinary Problems (Resolved)        Problem: Physical Therapy Goal    Goal Priority Disciplines Outcome Goal Variances Interventions   Physical Therapy Goal   (Resolved)     PT, PT/OT Met     Description:  Goals to be met by: 1/14/20       Patient will increase functional independence with mobility by performing (while maintaining sternal precautions):    1. Supine to sit with Modified [place]. - not met  2. Sit to supine with Modified [place]. - not met  3. Sit to stand transfer with Modified [place]. - met 1/9/2020  4. Bed to chair transfer with Modified [place]. - not met  5. Gait  x 200 feet with Supervision. -  Met 1/9/2020  6. Ascend/descend 1 flight stair with Stand-by Assistance. - met 1/9/2020  7. Stand for 3 minutes with Stand-by Assistance while performing a dynamic task. - not met  8. Lower extremity exercise program x 20 reps per handout, with independence. - not met                       Reasons for Discontinuation of Therapy Services  Satisfactory goal achievement.      Plan:     Patient Discharged to: At this time pt remains in CTSU. After discharge it is recommended pt return Home no PT services needed.    Maddie Santos, PT, DPT  1/9/2020  Pager: [phone]

## 2020-01-09 NOTE — NURSING
Results for IRWIN SPAIN (MRN [id number]) as of 1/9/2020 15:54   Ref. Range 1/8/2020 03:14 1/8/2020 07:56 1/8/2020 08:07 1/8/2020 11:36 1/9/2020 04:37   Potassium Latest Ref Range: 3.5 - 5.1 mmol/L 4.6 4.6   3.9   Contacted TESSA Mcqueen NP, via secure chat.

## 2020-01-09 NOTE — PROGRESS NOTES
[facility]  Cardiothoracic Surgery  Progress Note    Patient Name: Guillermo Gee  MRN: [id number]  Admission Date: 1/6/2020  Hospital Length of Stay: 3 days  Code Status: Full Code   Attending Physician: Allen Baez MD   Referring Provider: Allen Baez MD  Principal Problem:Hx of ascending aorta replacement      Subjective:     Post-Op Info:  Procedure(s) (LRB):  REPLACEMENT, AORTA, ASCENDING (N/A)   3 Days Post-Op     Interval History: NAEON. Pt was transferred yesterday from ICU. Chest tubes and pacer wires removed. PT states he can breath better after removal     Review of Systems   Constitution: Negative for malaise/fatigue.   Cardiovascular: Negative for chest pain, claudication, dyspnea on exertion, irregular heartbeat, leg swelling and palpitations.   Respiratory: Negative for cough and shortness of breath.    Hematologic/Lymphatic: Negative for bleeding problem.   Gastrointestinal: Negative for abdominal pain.   Genitourinary: Negative for dysuria.   Neurological: Negative for headaches and weakness.     Medications:  Continuous Infusions:  Scheduled Meds:   aspirin  325 mg Oral Daily    atorvastatin  10 mg Oral QHS    docusate sodium  100 mg Oral BID    furosemide  40 mg Oral BID    metoprolol tartrate  12.5 mg Oral BID    mupirocin  1 g Nasal BID    pantoprazole  40 mg Oral Daily    polyethylene glycol  17 g Oral Daily    sodium phosphate IVPB  39.99 mmol Intravenous Once     PRN Meds:acetaminophen, aspirin, bisacodyl, Dextrose 10% Bolus, Dextrose 10% Bolus, diphenhydrAMINE, metoclopramide HCl, ondansetron, oxyCODONE, oxyCODONE, sodium chloride 0.9%     Objective:     Vital Signs (Most Recent):  Temp: 98.7 °F (37.1 °C) (01/09/20 0430)  Pulse: 71 (01/09/20 0724)  Resp: 18 (01/09/20 0430)  BP: 108/61 (01/09/20 0430)  SpO2: 96 % (01/09/20 0430) Vital Signs (24h Range):  Temp:  [98.5 °F (36.9 °C)-100.7 °F (38.2 °C)] 98.7 °F (37.1 °C)  Pulse:  [68-84] 71  Resp:   [18-34] 18  SpO2:  [84 %-96 %] 96 %  BP: (102-116)/(56-69) 108/61     Weight: 97 kg (213 lb 13.5 oz)  Body mass index is 30.68 kg/m².    SpO2: 96 %  O2 Device (Oxygen Therapy): room air    Intake/Output - Last 3 Shifts       01/07 0700 - 01/08 0659 01/08 0700 - 01/09 0659 01/09 0700 - 01/10 0659    P.O.  1290 360    I.V. (mL/kg) 552 (6.1)      Blood       Total Intake(mL/kg) 552 (6.1) 1290 (13.3) 360 (3.7)    Urine (mL/kg/hr) 2115 (1) 2700 (1.2)     Chest Tube 220 62     Total Output 2335 2762     Net -1783 -1472 +360                 Lines/Drains/Airways     Drain                 Y Chest Tube 1 and 2 01/06/20 1140 1 Anterior Mediastinal 19 Fr. 2 Anterior Mediastinal 19 Fr. 2 days          Peripheral Intravenous Line                 Peripheral IV - Single Lumen 01/06/20 0730 18 G Left Hand 3 days         Peripheral IV - Single Lumen 01/06/20 0730 18 G Right Hand 3 days                Physical Exam   Constitutional: He is oriented to person, place, and time. He appears well-developed and well-nourished.   Cardiovascular: Normal rate, regular rhythm and normal heart sounds.   Pulmonary/Chest: Effort normal and breath sounds normal.   Abdominal: Soft.   Musculoskeletal: Normal range of motion.   Neurological: He is alert and oriented to person, place, and time.   Skin: Skin is warm, dry and intact.   Sternal Incision CDI   Psychiatric: He has a normal mood and affect.       Significant Labs:  BMP:   Recent Labs   Lab 01/09/20  0437   GLU 94      K 3.9      CO2 26   BUN 15   CREATININE 1.0   CALCIUM 8.8   MG 2.0     CBC:   Recent Labs   Lab 01/09/20  0437   WBC 10.85   RBC 3.91*   HGB 11.5*   HCT 36.2*   *   MCV 93   MCH 29.4   MCHC 31.8*       Significant Diagnostics:  I have reviewed all pertinent imaging results/findings within the past 24 hours.    Assessment/Plan:     * Hx of ascending aorta replacement  Sternal Precautions  PT/OT  Ambulate  ASA  BB  Statin (pt refusing)  Lasix  Chest tubes and  pacer wires removed    Dispo: home tomorrow    Thrombocytopenia, unspecified  CBC daily   Continue to monitor     Hypophosphatemia  Replaced with phos IVPB  bmp daily   Continue to monitor     Anemia  Expected ABLA  CBC daily   Continue to monitor     Pre-diabetes  Endocrine consulted   No needs        Eloina Mcqueen NP  Cardiothoracic Surgery  [facility]

## 2020-01-09 NOTE — PROGRESS NOTES
01/09/20 0430   Vital Signs   Temp 98.7 °F (37.1 °C)   Temp src Oral   Pulse 71   Heart Rate Source Monitor   Resp 18   SpO2 96 %   Pulse Oximetry Type Intermittent   O2 Device (Oxygen Therapy) room air   /61   MAP (mmHg) 78   BP Location Right arm   BP Method Automatic   Patient Position Lying   Patient is complaining of SOB.  Applied 2L nasal cannula per patient request.  TESSA Mcqueen NP, notified.

## 2020-01-09 NOTE — PLAN OF CARE
01/09/20 1532   Discharge Assessment   Assessment Type Discharge Planning Reassessment   Confirmed/corrected address and phone number on facesheet? Yes   Assessment information obtained from? Patient   Expected Length of Stay (days) 1   Communicated expected length of stay with patient/caregiver yes   Prior to hospitilization cognitive status: Alert/Oriented   Prior to hospitalization functional status: Independent   Current cognitive status: Alert/Oriented   Current Functional Status: Independent   Facility Arrived From: home for planned surgery   Lives With significant other   Able to Return to Prior Arrangements yes   Is patient able to care for self after discharge? No  (Pt will be on sternal precautions. No driving for 4 weeks)   Patient's perception of discharge disposition home or selfcare   Readmission Within the Last 30 Days no previous admission in last 30 days   Patient currently being followed by outpatient case management? No   Patient currently receives any other outside agency services? No   Equipment Currently Used at Home none   Do you have any problems affording any of your prescribed medications? No   Does the patient have transportation home? Yes   Transportation Anticipated family or friend will provide   Discharge Plan A Home with family   Discharge Plan B Home with family   DME Needed Upon Discharge  none   Patient/Family in Agreement with Plan yes     CM to bedside for d/c planning assessment. Family at bedside. Pt cheerful and hopeful for d/c tomorrow. Pt s/p  ascending aorta replacement             POD 3. Discussed sternal precautions with patient. Pt able to state back precautions to CM.    Pt has no question at this time. CM will continue to follow for d/c planning.    Julie Haase RN  Case Management [phone]

## 2020-01-09 NOTE — ASSESSMENT & PLAN NOTE
Sternal Precautions  PT/OT  Ambulate  ASA  BB  Statin (pt refusing)  Lasix  Chest tubes and pacer wires removed    Dispo: home tomorrow

## 2020-01-10 ENCOUNTER — TELEPHONE (OUTPATIENT)
Dept: CARDIOTHORACIC SURGERY | Facility: CLINIC | Age: 60
End: 2020-01-10

## 2020-01-10 VITALS
BODY MASS INDEX: 29.63 KG/M2 | HEIGHT: 70 IN | OXYGEN SATURATION: 90 % | HEART RATE: 75 BPM | TEMPERATURE: 99 F | RESPIRATION RATE: 18 BRPM | WEIGHT: 207 LBS | SYSTOLIC BLOOD PRESSURE: 96 MMHG | DIASTOLIC BLOOD PRESSURE: 58 MMHG

## 2020-01-10 LAB
ANION GAP SERPL CALC-SCNC: 9 MMOL/L (ref 8–16)
BASOPHILS # BLD AUTO: 0.01 K/UL (ref 0–0.2)
BASOPHILS NFR BLD: 0.1 % (ref 0–1.9)
BUN SERPL-MCNC: 13 MG/DL (ref 6–20)
CALCIUM SERPL-MCNC: 8.6 MG/DL (ref 8.7–10.5)
CHLORIDE SERPL-SCNC: 100 MMOL/L (ref 95–110)
CO2 SERPL-SCNC: 30 MMOL/L (ref 23–29)
CREAT SERPL-MCNC: 1.1 MG/DL (ref 0.5–1.4)
DIFFERENTIAL METHOD: ABNORMAL
EOSINOPHIL # BLD AUTO: 0.2 K/UL (ref 0–0.5)
EOSINOPHIL NFR BLD: 1.9 % (ref 0–8)
ERYTHROCYTE [DISTWIDTH] IN BLOOD BY AUTOMATED COUNT: 12.9 % (ref 11.5–14.5)
EST. GFR  (AFRICAN AMERICAN): >60 ML/MIN/1.73 M^2
EST. GFR  (NON AFRICAN AMERICAN): >60 ML/MIN/1.73 M^2
GLUCOSE SERPL-MCNC: 97 MG/DL (ref 70–110)
HCT VFR BLD AUTO: 35.4 % (ref 40–54)
HGB BLD-MCNC: 11.3 G/DL (ref 14–18)
IMM GRANULOCYTES # BLD AUTO: 0.02 K/UL (ref 0–0.04)
IMM GRANULOCYTES NFR BLD AUTO: 0.2 % (ref 0–0.5)
LYMPHOCYTES # BLD AUTO: 1.8 K/UL (ref 1–4.8)
LYMPHOCYTES NFR BLD: 20.4 % (ref 18–48)
MAGNESIUM SERPL-MCNC: 1.9 MG/DL (ref 1.6–2.6)
MCH RBC QN AUTO: 29.4 PG (ref 27–31)
MCHC RBC AUTO-ENTMCNC: 31.9 G/DL (ref 32–36)
MCV RBC AUTO: 92 FL (ref 82–98)
MONOCYTES # BLD AUTO: 1.1 K/UL (ref 0.3–1)
MONOCYTES NFR BLD: 13 % (ref 4–15)
NEUTROPHILS # BLD AUTO: 5.7 K/UL (ref 1.8–7.7)
NEUTROPHILS NFR BLD: 64.4 % (ref 38–73)
NRBC BLD-RTO: 0 /100 WBC
PHOSPHATE SERPL-MCNC: 2.5 MG/DL (ref 2.7–4.5)
PLATELET # BLD AUTO: 175 K/UL (ref 150–350)
PMV BLD AUTO: 10.2 FL (ref 9.2–12.9)
POTASSIUM SERPL-SCNC: 3.9 MMOL/L (ref 3.5–5.1)
RBC # BLD AUTO: 3.84 M/UL (ref 4.6–6.2)
SODIUM SERPL-SCNC: 139 MMOL/L (ref 136–145)
WBC # BLD AUTO: 8.78 K/UL (ref 3.9–12.7)

## 2020-01-10 PROCEDURE — 80048 BASIC METABOLIC PNL TOTAL CA: CPT

## 2020-01-10 PROCEDURE — 36415 COLL VENOUS BLD VENIPUNCTURE: CPT

## 2020-01-10 PROCEDURE — 97530 THERAPEUTIC ACTIVITIES: CPT

## 2020-01-10 PROCEDURE — 25000003 PHARM REV CODE 250: Performed by: THORACIC SURGERY (CARDIOTHORACIC VASCULAR SURGERY)

## 2020-01-10 PROCEDURE — 25000003 PHARM REV CODE 250: Performed by: NURSE PRACTITIONER

## 2020-01-10 PROCEDURE — 83735 ASSAY OF MAGNESIUM: CPT

## 2020-01-10 PROCEDURE — 85025 COMPLETE CBC W/AUTO DIFF WBC: CPT

## 2020-01-10 PROCEDURE — 84100 ASSAY OF PHOSPHORUS: CPT

## 2020-01-10 PROCEDURE — 25000003 PHARM REV CODE 250: Performed by: STUDENT IN AN ORGANIZED HEALTH CARE EDUCATION/TRAINING PROGRAM

## 2020-01-10 RX ORDER — ATORVASTATIN CALCIUM 10 MG/1
10 TABLET, FILM COATED ORAL NIGHTLY
Qty: 90 TABLET | Refills: 3 | Status: SHIPPED | OUTPATIENT
Start: 2020-01-10 | End: 2020-01-28

## 2020-01-10 RX ORDER — METOPROLOL TARTRATE 25 MG/1
12.5 TABLET, FILM COATED ORAL 2 TIMES DAILY
Qty: 30 TABLET | Refills: 11 | Status: SHIPPED | OUTPATIENT
Start: 2020-01-10 | End: 2020-03-02 | Stop reason: ALTCHOICE

## 2020-01-10 RX ORDER — DOCUSATE SODIUM 100 MG/1
100 CAPSULE, LIQUID FILLED ORAL 2 TIMES DAILY
Qty: 20 CAPSULE | Refills: 0 | Status: SHIPPED | OUTPATIENT
Start: 2020-01-10 | End: 2020-01-20

## 2020-01-10 RX ORDER — POTASSIUM CHLORIDE 20 MEQ/1
20 TABLET, EXTENDED RELEASE ORAL DAILY
Qty: 60 TABLET | Refills: 11 | Status: SHIPPED | OUTPATIENT
Start: 2020-01-10 | End: 2020-01-30 | Stop reason: ALTCHOICE

## 2020-01-10 RX ORDER — ASPIRIN 325 MG
325 TABLET ORAL DAILY
Qty: 90 TABLET | Refills: 3 | Status: SHIPPED | OUTPATIENT
Start: 2020-01-10 | End: 2021-03-01

## 2020-01-10 RX ORDER — TRAMADOL HYDROCHLORIDE 50 MG/1
50 TABLET ORAL EVERY 4 HOURS PRN
Qty: 42 TABLET | Refills: 0 | Status: SHIPPED | OUTPATIENT
Start: 2020-01-10 | End: 2020-01-17

## 2020-01-10 RX ORDER — FUROSEMIDE 20 MG/1
20 TABLET ORAL 2 TIMES DAILY
Qty: 60 TABLET | Refills: 11 | Status: SHIPPED | OUTPATIENT
Start: 2020-01-10 | End: 2020-01-30 | Stop reason: ALTCHOICE

## 2020-01-10 RX ADMIN — POLYETHYLENE GLYCOL 3350 17 G: 17 POWDER, FOR SOLUTION ORAL at 09:01

## 2020-01-10 RX ADMIN — FUROSEMIDE 40 MG: 40 TABLET ORAL at 09:01

## 2020-01-10 RX ADMIN — ASPIRIN 325 MG ORAL TABLET 325 MG: 325 PILL ORAL at 09:01

## 2020-01-10 RX ADMIN — MUPIROCIN 1 G: 20 OINTMENT TOPICAL at 09:01

## 2020-01-10 RX ADMIN — DOCUSATE SODIUM 100 MG: 100 CAPSULE, LIQUID FILLED ORAL at 09:01

## 2020-01-10 RX ADMIN — ACETAMINOPHEN 650 MG: 325 TABLET ORAL at 07:01

## 2020-01-10 RX ADMIN — DIBASIC SODIUM PHOSPHATE, MONOBASIC POTASSIUM PHOSPHATE AND MONOBASIC SODIUM PHOSPHATE 2 TABLET: 852; 155; 130 TABLET ORAL at 09:01

## 2020-01-10 RX ADMIN — METOPROLOL TARTRATE 12.5 MG: 25 TABLET, FILM COATED ORAL at 09:01

## 2020-01-10 RX ADMIN — PANTOPRAZOLE SODIUM 40 MG: 40 TABLET, DELAYED RELEASE ORAL at 09:01

## 2020-01-10 NOTE — PT/OT/SLP PROGRESS
"Occupational Therapy   Treatment/ Discharge    Name: Guillermo Gee  MRN: [id number]  Admitting Diagnosis:  Hx of ascending aorta replacement  4 Days Post-Op    Recommendations:     Discharge Recommendations: home  Discharge Equipment Recommendations:  none  Barriers to discharge:  None    Assessment:     Guillermo Gee is a 59 y.o. male with a medical diagnosis of Hx of ascending aorta replacement.  Pt presents with no functional impairments that would affect his ability to complete his self care tasks and functional mobility on this date. No further skilled OT needed in this setting.     Plan:     No further OT needed in this setting.  · Plan of Care Reviewed with: patient    Subjective     Pain/Comfort: "I don't have any concerns about going home."  · Pain Rating 1: 0/10  · Pain Rating Post-Intervention 1: 0/10    Objective:     Communicated with: RN prior to session.  Patient found HOB elevated with telemetry, peripheral IV upon OT entry to room.    General Precautions: Standard, fall, sternal   Orthopedic Precautions:N/A   Braces: N/A     Occupational Performance:     Bed Mobility:    · Patient completed Scooting/Bridging with independence  · Patient completed Supine to Sit with independence  · Patient completed Sit to Supine with independence     Functional Mobility/Transfers:  · Patient completed Sit <> Stand Transfer from bed and commode with independence  with  no assistive device   · Patient completed Toilet Transfer Step Transfer technique with independence with  no AD  · Functional Mobility: Pt ambulated ~350 ft with mod I and no AD. No LOB noted. No RBs required.     Activities of Daily Living:  · Pt completed ADLs prior to OT's arrival. Pt reported no functional deficits that would affect his ability to complete any ADL.       [facility] 6 Click ADL: 24    Treatment & Education:  - Role of OT/ OT POC  - Self care safety/ independence  - Functional transfer/ mobility safety  - Bed mobility safety  - Importance " of sitting UIC throughout the day  - Energy conservation techniques such as pacing and taking rest breaks as needed  - Importance of staying active upon returning home     Patient left HOB elevated with all lines intact, call button in reach and RN presentEducation:      GOALS:   Multidisciplinary Problems     Occupational Therapy Goals     Not on file          Multidisciplinary Problems (Resolved)        Problem: Occupational Therapy Goal    Goal Priority Disciplines Outcome Interventions   Occupational Therapy Goal   (Resolved)     OT, PT/OT Met    Description:  Goals to be met by:  1/14/2020    Patient will increase functional independence with ADLs by performing:    UE Dressing with [place].  LE Dressing with [place].  Grooming while standing with [place].  Toileting from toilet with Supervision for hygiene and clothing management.   Bathing from  standing at sink with Stand-by Assistance.  Toilet transfer to toilet with Supervision.  Pt will teachback 3/3 sternal precautions with independence.                    Time Tracking:     OT Date of Treatment: 01/10/20  OT Start Time: 0855  OT Stop Time: 0911  OT Total Time (min): 16 min    Billable Minutes:Therapeutic Activity 16    Angela Okeefe OT  1/10/2020

## 2020-01-10 NOTE — NURSING
Patient left CTSU via wheelchair provided by transport.  VSS.  Patient secured heart pillow and incentive spirometer.

## 2020-01-10 NOTE — PROGRESS NOTES
01/09/20 1919   Vital Signs   Temp (!) 101.5 °F (38.6 °C)   Temp src Oral     CTS notified. No further orders. Already gave PRN Tylonol and Benedryl

## 2020-01-10 NOTE — CODE/ RAPID DOCUMENTATION
Rapid Response Nurse Chart Check     Chart check completed, abnormal VS noted. 101.5 tylenol given No further orders per team for now. Bedside RN contacted, no concerns verbalized at this time, instructed to call [phone] for further concerns or assistance.

## 2020-01-10 NOTE — PLAN OF CARE
POC reviewed with pt and family at bedside. POD 3. 2 Med CT removed today. MSI JYOTI with steri strips. MAP goal kept between 60-80. Temp 101.5 PRN tylenol given. RM air. NSR on monitor. Benedryl given for itching caused by oxy. No complaints at this time. Pt still refusing his Atorvastatin. Fluid restriction education reinforced. Will continue to monitor.

## 2020-01-10 NOTE — DISCHARGE SUMMARY
[facility]  Cardiothoracic Surgery  Discharge Summary      Patient Name: Guillermo Gee  MRN: [id number]  Admission Date: 1/6/2020  Hospital Length of Stay: 4 days  Discharge Date and Time:  01/10/2020 8:15 AM  Attending Physician: Allen Baez MD   Discharging Provider: Eloina Mcqueen NP  Primary Care Provider: Polo Osman Jr, MD    HPI:   Mr. Gee is a 59-year-old gentleman who initially had a CT   calcium score done as part of a routine physical exam.  This study demonstrated   an ascending aortic aneurysm that was roughly 5.5 cm in diameter.  Based on this   finding, he underwent a complete evaluation, which included coronary   angiography as well as echocardiography.  The angiogram failed to demonstrate   any hemodynamically significant lesions.  The echo demonstrated a bicuspid   aortic valve with normal function.  He now presents for ascending aortic   replacement.       Procedure(s) (LRB):  REPLACEMENT, AORTA, ASCENDING (N/A)      Indwelling Lines/Drains at time of discharge:   Lines/Drains/Airways     None               Hospital Course: On 1/6/20 the patient was taken to the Operating Room for the above stated procedure. Please see the previously dictated operative report for complete details. Postoperatively, the patient was taken from the  Operating Room to the ICU where the vital signs were monitored and pain was kept under control. The patient was weaned from the drips and extubated in the ICU per protocol. Once hemodynamically stable, the patient was transferred to the Cardiac Step-Down floor for continued strengthening and ambulation. On postoperative day 4, the patient was ready for discharge to home. At the time of discharge, the patient was ambulating unassisted. Pain was well controlled with oral analgesics and the patient was tolerating the diet.     MOBILITY AND ACTIVITY: As tolerated. Patient may shower. No heavy lifting of greater than 5 pounds and no driving.      DIET: An 1800-calorie ADA with a 1500 mL fluid restriction.     WOUND CARE INSTRUCTIONS: Check for redness, swelling and drainage around the  incision or wound. Patient is to call for any obvious bleeding, drainage, pus from the wound, unusual problems or difficulties or temperature of greater than 101   degrees.  FOLLOWUP: Follow up with  in approximately 3 weeks. Prior to this  appointment, the patient will have a chest x-ray and EKG.     Patient not placed on Ace-Inhibitor at the time of discharge due to potential for hypotension        DISCHARGE CONDITION: At the time of discharge, the patient was in sinus rhythm and afebrile with stable vital signs.     Consults (From admission, onward)        Status Ordering Provider     Consult Case Management/Social Work  Once     Provider:  (Not yet assigned)    Acknowledged TRUMAN CONNORS     Consult to Endocrinology  Once     Provider:  (Not yet assigned)    Completed TRUMAN CONNORS          Pending Diagnostic Studies:     Procedure Component Value Units Date/Time    Specimen to Pathology, Surgery Cardiovascular [[id number]] Collected:  01/06/20 1057    Order Status:  Sent Lab Status:  In process Updated:  01/06/20 1509    X-Ray Chest 1 View [[id number]]     Order Status:  Sent Lab Status:  No result           No new Assessment & Plan notes have been filed under this hospital service since the last note was generated.  Service: Cardiothoracic Surgery    Final Active Diagnoses:    Diagnosis Date Noted POA    PRINCIPAL PROBLEM:  Hx of ascending aorta replacement [Z95.828] 01/08/2020 Not Applicable    Anemia [D64.9] 01/09/2020 No    Hypophosphatemia [E83.39] 01/09/2020 No    Thrombocytopenia, unspecified [D69.6] 01/09/2020 No    Pre-diabetes [R73.03] 01/06/2020 Yes      Problems Resolved During this Admission:    Diagnosis Date Noted Date Resolved POA    Ascending aortic aneurysm [I71.2] 01/06/2020 01/07/2020 Yes      Discharged Condition:  stable    Disposition: Home or Self Care    Follow Up:  Follow-up Information     Allen Baez MD In 3 weeks.    Specialties:  Cardiothoracic Surgery, Transplant  Contact information:  [address]  [facility] [id number] [phone]                 Patient Instructions:   No discharge procedures on file.  Medications:  Reconciled Home Medications:      Medication List      START taking these medications    aspirin 325 MG tablet  Take 1 tablet (325 mg total) by mouth once daily.  Replaces:  aspirin 81 MG EC tablet     atorvastatin 10 MG tablet  Commonly known as:  LIPITOR  Take 1 tablet (10 mg total) by mouth every evening.     docusate sodium 100 MG capsule  Commonly known as:  COLACE  Take 1 capsule (100 mg total) by mouth 2 (two) times daily. for 10 days     furosemide 20 MG tablet  Commonly known as:  LASIX  Take 1 tablet (20 mg total) by mouth 2 (two) times daily. Take one tablet by mouth twice daily for 7 days then decrease to once daily     metoprolol tartrate 25 MG tablet  Commonly known as:  LOPRESSOR  Take 0.5 tablets (12.5 mg total) by mouth 2 (two) times daily.     potassium chloride SA 20 MEQ tablet  Commonly known as:  K-DUR,KLOR-CON  Take 1 tablet (20 mEq total) by mouth once daily. Take one tablet by mouth twice daily for 7 days then decrease to once daily     traMADol 50 mg tablet  Commonly known as:  ULTRAM  Take 1 tablet (50 mg total) by mouth every 4 (four) hours as needed for Pain.        CONTINUE taking these medications    b complex vitamins tablet  Take 1 tablet by mouth once daily.     BIOTIN 100+10 ORAL  Take by mouth.     fish oil-omega-3 fatty acids 300-1,000 mg capsule  Take 2 capsules by mouth once daily.        STOP taking these medications    aspirin 81 MG EC tablet  Commonly known as:  ECOTRIN  Replaced by:  aspirin 325 MG tablet          Time spent on the discharge of patient: 35 minutes    Eloina Mcqueen NP  Cardiothoracic Surgery  [facility]

## 2020-01-10 NOTE — PLAN OF CARE
Pt d/c home with no needs. Family to transport.    Future Appointments   Date Time Provider Department [place]   2/3/2020  7:00 AM LAB, [facility] LAB [facility]   2/4/2020 10:00 AM EKG, APPT [facility] EKG [facility]   2/4/2020 10:15 AM [facility] [id number] LB LIMIT [facility] XRAY OP [facility]   2/4/2020 10:45 AM Allen Baez MD [facility] CARDVAS [facility]        01/10/20 0843   Final Note   Assessment Type Final Discharge Note   Anticipated Discharge Disposition Home   Hospital Follow Up  Appt(s) scheduled? Yes   Discharge plans and expectations educations in teach back method with documentation complete? Yes     Julie Haase RN  Case Management [phone]

## 2020-01-10 NOTE — HOSPITAL COURSE
On 1/6/20 the patient was taken to the Operating Room for the above stated procedure. Please see the previously dictated operative report for complete details. Postoperatively, the patient was taken from the  Operating Room to the ICU where the vital signs were monitored and pain was kept under control. The patient was weaned from the drips and extubated in the ICU per protocol. Once hemodynamically stable, the patient was transferred to the Cardiac Step-Down floor for continued strengthening and ambulation. On postoperative day 4, the patient was ready for discharge to home. At the time of discharge, the patient was ambulating unassisted. Pain was well controlled with oral analgesics and the patient was tolerating the diet.     MOBILITY AND ACTIVITY: As tolerated. Patient may shower. No heavy lifting of greater than 5 pounds and no driving.     DIET: An 1800-calorie ADA with a 1500 mL fluid restriction.     WOUND CARE INSTRUCTIONS: Check for redness, swelling and drainage around the  incision or wound. Patient is to call for any obvious bleeding, drainage, pus from the wound, unusual problems or difficulties or temperature of greater than 101   degrees.  FOLLOWUP: Follow up with  in approximately 3 weeks. Prior to this  appointment, the patient will have a chest x-ray and EKG.     Patient not placed on Ace-Inhibitor at the time of discharge due to potential for hypotension        DISCHARGE CONDITION: At the time of discharge, the patient was in sinus rhythm and afebrile with stable vital signs.

## 2020-01-10 NOTE — PROGRESS NOTES
01/09/20 2317   Vital Signs   Temp (!) 100.6 °F (38.1 °C)   Temp src Oral     CTS notified. No further orders given. Tylenol given

## 2020-01-10 NOTE — PT/OT/SLP DISCHARGE
Occupational Therapy Discharge Summary    Guillermo Gee  MRN: [id number]   Principal Problem: Hx of ascending aorta replacement      Patient Discharged from acute Occupational Therapy on 1/10/20.  Please refer to prior OT note dated 1/10/20 for functional status.    Assessment:      Patient has met all goals and is not appropriate for therapy.    Objective:     GOALS:   Multidisciplinary Problems     Occupational Therapy Goals     Not on file          Multidisciplinary Problems (Resolved)        Problem: Occupational Therapy Goal    Goal Priority Disciplines Outcome Interventions   Occupational Therapy Goal   (Resolved)     OT, PT/OT Met    Description:  Goals to be met by:  1/14/2020    Patient will increase functional independence with ADLs by performing:    UE Dressing with [place].  LE Dressing with [place].  Grooming while standing with [place].  Toileting from toilet with Supervision for hygiene and clothing management.   Bathing from  standing at sink with Stand-by Assistance.  Toilet transfer to toilet with Supervision.  Pt will teachback 3/3 sternal precautions with independence.                    Reasons for Discontinuation of Therapy Services  Satisfactory goal achievement.      Plan:     Patient Discharged to: Home no OT services needed    Angela Okeefe, OT  1/10/2020

## 2020-01-10 NOTE — PLAN OF CARE
Problem: Occupational Therapy Goal  Goal: Occupational Therapy Goal  Description  Goals to be met by:  1/14/2020    Patient will increase functional independence with ADLs by performing:    UE Dressing with [place].  LE Dressing with [place].  Grooming while standing with [place].  Toileting from toilet with Supervision for hygiene and clothing management.   Bathing from  standing at sink with Stand-by Assistance.  Toilet transfer to toilet with Supervision.  Pt will teachback 3/3 sternal precautions with independence.   Outcome: Met     Pt presents with no functional impairments that would affect his ability to complete his self care tasks and functional mobility on this date. No further skilled OT needed in this setting.     Angela Okeefe OTR/L  1/10/20

## 2020-01-13 ENCOUNTER — PATIENT MESSAGE (OUTPATIENT)
Dept: CARDIOTHORACIC SURGERY | Facility: CLINIC | Age: 60
End: 2020-01-13

## 2020-01-14 ENCOUNTER — DOCUMENTATION ONLY (OUTPATIENT)
Dept: FAMILY MEDICINE | Facility: CLINIC | Age: 60
End: 2020-01-14

## 2020-01-14 ENCOUNTER — PATIENT OUTREACH (OUTPATIENT)
Dept: ADMINISTRATIVE | Facility: CLINIC | Age: 60
End: 2020-01-14

## 2020-01-14 NOTE — PROGRESS NOTES
[facility]

## 2020-01-14 NOTE — TELEPHONE ENCOUNTER
C3 nurse attempted to contact patient. No answer. The following message was left for the patient to return the call:  Good afternoon, my name is Shawna and I am a registered nurse calling on behalf of [facility] from the [facility] Coordination [facility].  This is a Transitional Care call for Guillermo Gee.  When you have a moment please contact us at [phone] between 8 and 4, Monday through Friday. If you have questions or issues, a nurse is available 24 hours every day at our ON CALL # thats [phone]. On behalf of [facility], thank you, and have a nice day.    The patient does not have a scheduled HOSFU appointment within 7-14 days post hospital discharge date 1/10/2020. Message sent to Physician staff to assist with HOSFU appointment scheduling.

## 2020-01-15 ENCOUNTER — PATIENT MESSAGE (OUTPATIENT)
Dept: CARDIOTHORACIC SURGERY | Facility: CLINIC | Age: 60
End: 2020-01-15

## 2020-01-15 ENCOUNTER — TELEPHONE (OUTPATIENT)
Dept: CARDIOTHORACIC SURGERY | Facility: CLINIC | Age: 60
End: 2020-01-15

## 2020-01-21 LAB
FINAL PATHOLOGIC DIAGNOSIS: NORMAL
GROSS: NORMAL

## 2020-01-28 ENCOUNTER — PATIENT MESSAGE (OUTPATIENT)
Dept: FAMILY MEDICINE | Facility: CLINIC | Age: 60
End: 2020-01-28

## 2020-01-30 ENCOUNTER — HOSPITAL ENCOUNTER (OUTPATIENT)
Dept: CARDIOLOGY | Facility: CLINIC | Age: 60
Discharge: HOME | End: 2020-01-30
Payer: COMMERCIAL

## 2020-01-30 ENCOUNTER — OFFICE VISIT (OUTPATIENT)
Dept: CARDIOTHORACIC SURGERY | Facility: CLINIC | Age: 60
End: 2020-01-30
Payer: COMMERCIAL

## 2020-01-30 ENCOUNTER — HOSPITAL ENCOUNTER (OUTPATIENT)
Dept: RADIOLOGY | Facility: HOSPITAL | Age: 60
Discharge: HOME | End: 2020-01-30
Attending: THORACIC SURGERY (CARDIOTHORACIC VASCULAR SURGERY)
Payer: COMMERCIAL

## 2020-01-30 VITALS
WEIGHT: 189 LBS | OXYGEN SATURATION: 97 % | DIASTOLIC BLOOD PRESSURE: 76 MMHG | SYSTOLIC BLOOD PRESSURE: 107 MMHG | BODY MASS INDEX: 27.12 KG/M2 | HEART RATE: 84 BPM

## 2020-01-30 DIAGNOSIS — Z95.828 S/P ASCENDING AORTIC REPLACEMENT: ICD-10-CM

## 2020-01-30 DIAGNOSIS — Z95.828 HX OF ASCENDING AORTA REPLACEMENT: Primary | ICD-10-CM

## 2020-01-30 DIAGNOSIS — I71.20 THORACIC AORTIC ANEURYSM WITHOUT RUPTURE: Primary | ICD-10-CM

## 2020-01-30 PROCEDURE — 71046 XR CHEST PA AND LATERAL: ICD-10-PCS | Mod: 26,,, | Performed by: RADIOLOGY

## 2020-01-30 PROCEDURE — 99999 PR PBB SHADOW E&M-EST. PATIENT-LVL III: CPT | Mod: PBBFAC,,, | Performed by: THORACIC SURGERY (CARDIOTHORACIC VASCULAR SURGERY)

## 2020-01-30 PROCEDURE — 71046 X-RAY EXAM CHEST 2 VIEWS: CPT | Mod: 26,,, | Performed by: RADIOLOGY

## 2020-01-30 PROCEDURE — 71046 X-RAY EXAM CHEST 2 VIEWS: CPT | Mod: TC,FY

## 2020-01-30 PROCEDURE — 93005 ELECTROCARDIOGRAM TRACING: CPT | Mod: S$GLB,,, | Performed by: THORACIC SURGERY (CARDIOTHORACIC VASCULAR SURGERY)

## 2020-01-30 PROCEDURE — 93005 EKG 12-LEAD: ICD-10-PCS | Mod: S$GLB,,, | Performed by: THORACIC SURGERY (CARDIOTHORACIC VASCULAR SURGERY)

## 2020-01-30 PROCEDURE — 93010 EKG 12-LEAD: ICD-10-PCS | Mod: S$GLB,,, | Performed by: INTERNAL MEDICINE

## 2020-01-30 PROCEDURE — 93010 ELECTROCARDIOGRAM REPORT: CPT | Mod: S$GLB,,, | Performed by: INTERNAL MEDICINE

## 2020-01-30 PROCEDURE — 99024 POSTOP FOLLOW-UP VISIT: CPT | Mod: S$GLB,,, | Performed by: THORACIC SURGERY (CARDIOTHORACIC VASCULAR SURGERY)

## 2020-01-30 PROCEDURE — 99999 PR PBB SHADOW E&M-EST. PATIENT-LVL III: ICD-10-PCS | Mod: PBBFAC,,, | Performed by: THORACIC SURGERY (CARDIOTHORACIC VASCULAR SURGERY)

## 2020-01-30 PROCEDURE — 99024 PR POST-OP FOLLOW-UP VISIT: ICD-10-PCS | Mod: S$GLB,,, | Performed by: THORACIC SURGERY (CARDIOTHORACIC VASCULAR SURGERY)

## 2020-01-30 NOTE — PATIENT INSTRUCTIONS
Cardiac Rehab will contact you for enrollment within 3 weeks.    Please make appointments to check in with your PCP and Cardiologist for sometime in the next 2 months.

## 2020-01-30 NOTE — PROGRESS NOTES
Patient seen and examined. Patient is progressively increasing activity. No significant complaints.     Sternum: stable, incision CDI  Chest xray: Acceptable post op chest  EKG: NSR     Assessment:  S/P Ascending aortic replacement with a 28 mm Gelweave Dacron   tube graft.     Plan:  Can begin driving   Can begin cardiac rehab 6 weeks after operation   We will refer to cardiology to assume care   DC lasix, potassium        No scheduled appointment, RTC prn    CTS Attending Note:    I have personally taken the history and examined this patient and agree with the KELLEY's note as stated above. 59-year-old male status post ascending aorta replacement.  His bicuspid valve was normal at operation and therefore not replaced.

## 2020-02-01 ENCOUNTER — PATIENT MESSAGE (OUTPATIENT)
Dept: FAMILY MEDICINE | Facility: CLINIC | Age: 60
End: 2020-02-01

## 2020-02-01 ENCOUNTER — PATIENT MESSAGE (OUTPATIENT)
Dept: CARDIOTHORACIC SURGERY | Facility: CLINIC | Age: 60
End: 2020-02-01

## 2020-02-01 DIAGNOSIS — I25.10 CORONARY ARTERY DISEASE INVOLVING NATIVE CORONARY ARTERY OF NATIVE HEART WITHOUT ANGINA PECTORIS: ICD-10-CM

## 2020-02-01 DIAGNOSIS — I71.21 ASCENDING AORTIC ANEURYSM: Primary | ICD-10-CM

## 2020-02-01 PROBLEM — Z98.890 STATUS POST THORACIC AORTIC ANEURYSM REPAIR: Status: ACTIVE | Noted: 2019-12-19

## 2020-02-01 PROBLEM — E83.39 HYPOPHOSPHATEMIA: Status: RESOLVED | Noted: 2020-01-09 | Resolved: 2020-02-01

## 2020-02-01 PROBLEM — Z86.79 STATUS POST THORACIC AORTIC ANEURYSM REPAIR: Status: ACTIVE | Noted: 2019-12-19

## 2020-02-01 PROBLEM — D69.6 THROMBOCYTOPENIA, UNSPECIFIED: Status: RESOLVED | Noted: 2020-01-09 | Resolved: 2020-02-01

## 2020-02-07 ENCOUNTER — PATIENT MESSAGE (OUTPATIENT)
Dept: CARDIOTHORACIC SURGERY | Facility: CLINIC | Age: 60
End: 2020-02-07

## 2020-02-10 ENCOUNTER — PATIENT MESSAGE (OUTPATIENT)
Dept: FAMILY MEDICINE | Facility: CLINIC | Age: 60
End: 2020-02-10

## 2020-02-10 ENCOUNTER — OFFICE VISIT (OUTPATIENT)
Dept: FAMILY MEDICINE | Facility: CLINIC | Age: 60
End: 2020-02-10
Attending: FAMILY MEDICINE
Payer: COMMERCIAL

## 2020-02-10 ENCOUNTER — LAB VISIT (OUTPATIENT)
Dept: LAB | Facility: HOSPITAL | Age: 60
End: 2020-02-10
Attending: FAMILY MEDICINE
Payer: COMMERCIAL

## 2020-02-10 VITALS
HEIGHT: 70 IN | SYSTOLIC BLOOD PRESSURE: 106 MMHG | DIASTOLIC BLOOD PRESSURE: 64 MMHG | WEIGHT: 201 LBS | BODY MASS INDEX: 28.77 KG/M2 | HEART RATE: 65 BPM

## 2020-02-10 DIAGNOSIS — Z86.79 STATUS POST THORACIC AORTIC ANEURYSM REPAIR: Primary | ICD-10-CM

## 2020-02-10 DIAGNOSIS — Z00.00 LABORATORY EXAM ORDERED AS PART OF ROUTINE GENERAL MEDICAL EXAMINATION: ICD-10-CM

## 2020-02-10 DIAGNOSIS — R73.03 PRE-DIABETES: ICD-10-CM

## 2020-02-10 DIAGNOSIS — Z98.890 STATUS POST THORACIC AORTIC ANEURYSM REPAIR: Primary | ICD-10-CM

## 2020-02-10 DIAGNOSIS — Z91.89 AT RISK FOR CORONARY ARTERY DISEASE: ICD-10-CM

## 2020-02-10 DIAGNOSIS — E78.00 HYPERCHOLESTEROLEMIA: ICD-10-CM

## 2020-02-10 DIAGNOSIS — R42 LIGHTHEADEDNESS: ICD-10-CM

## 2020-02-10 DIAGNOSIS — I25.10 CORONARY ARTERY DISEASE INVOLVING NATIVE CORONARY ARTERY OF NATIVE HEART WITHOUT ANGINA PECTORIS: ICD-10-CM

## 2020-02-10 DIAGNOSIS — Z91.89 FRAMINGHAM CARDIAC RISK 10-20% IN NEXT 10 YEARS: ICD-10-CM

## 2020-02-10 DIAGNOSIS — I95.9 HYPOTENSION, UNSPECIFIED HYPOTENSION TYPE: ICD-10-CM

## 2020-02-10 LAB
ANION GAP SERPL CALC-SCNC: 11 MMOL/L (ref 8–16)
BASOPHILS # BLD AUTO: 0.02 K/UL (ref 0–0.2)
BASOPHILS NFR BLD: 0.4 % (ref 0–1.9)
BUN SERPL-MCNC: 14 MG/DL (ref 6–20)
CALCIUM SERPL-MCNC: 9.5 MG/DL (ref 8.7–10.5)
CHLORIDE SERPL-SCNC: 104 MMOL/L (ref 95–110)
CHOLEST SERPL-MCNC: 241 MG/DL (ref 120–199)
CHOLEST/HDLC SERPL: 4.6 {RATIO} (ref 2–5)
CO2 SERPL-SCNC: 27 MMOL/L (ref 23–29)
CREAT SERPL-MCNC: 1.2 MG/DL (ref 0.5–1.4)
DIFFERENTIAL METHOD: ABNORMAL
EOSINOPHIL # BLD AUTO: 0.1 K/UL (ref 0–0.5)
EOSINOPHIL NFR BLD: 2.3 % (ref 0–8)
ERYTHROCYTE [DISTWIDTH] IN BLOOD BY AUTOMATED COUNT: 13.2 % (ref 11.5–14.5)
EST. GFR  (AFRICAN AMERICAN): >60 ML/MIN/1.73 M^2
EST. GFR  (NON AFRICAN AMERICAN): >60 ML/MIN/1.73 M^2
ESTIMATED AVG GLUCOSE: 114 MG/DL (ref 68–131)
GLUCOSE SERPL-MCNC: 99 MG/DL (ref 70–110)
HBA1C MFR BLD HPLC: 5.6 % (ref 4–5.6)
HCT VFR BLD AUTO: 46.1 % (ref 40–54)
HDLC SERPL-MCNC: 52 MG/DL (ref 40–75)
HDLC SERPL: 21.6 % (ref 20–50)
HGB BLD-MCNC: 14.3 G/DL (ref 14–18)
IMM GRANULOCYTES # BLD AUTO: 0.02 K/UL (ref 0–0.04)
IMM GRANULOCYTES NFR BLD AUTO: 0.4 % (ref 0–0.5)
LDLC SERPL CALC-MCNC: 169 MG/DL (ref 63–159)
LYMPHOCYTES # BLD AUTO: 1.7 K/UL (ref 1–4.8)
LYMPHOCYTES NFR BLD: 32.6 % (ref 18–48)
MCH RBC QN AUTO: 29.2 PG (ref 27–31)
MCHC RBC AUTO-ENTMCNC: 31 G/DL (ref 32–36)
MCV RBC AUTO: 94 FL (ref 82–98)
MONOCYTES # BLD AUTO: 0.6 K/UL (ref 0.3–1)
MONOCYTES NFR BLD: 11.4 % (ref 4–15)
NEUTROPHILS # BLD AUTO: 2.8 K/UL (ref 1.8–7.7)
NEUTROPHILS NFR BLD: 52.9 % (ref 38–73)
NONHDLC SERPL-MCNC: 189 MG/DL
NRBC BLD-RTO: 0 /100 WBC
PLATELET # BLD AUTO: 229 K/UL (ref 150–350)
PMV BLD AUTO: 10.8 FL (ref 9.2–12.9)
POTASSIUM SERPL-SCNC: 4.6 MMOL/L (ref 3.5–5.1)
RBC # BLD AUTO: 4.9 M/UL (ref 4.6–6.2)
SODIUM SERPL-SCNC: 142 MMOL/L (ref 136–145)
TRIGL SERPL-MCNC: 100 MG/DL (ref 30–150)
WBC # BLD AUTO: 5.27 K/UL (ref 3.9–12.7)

## 2020-02-10 PROCEDURE — 85025 COMPLETE CBC W/AUTO DIFF WBC: CPT

## 2020-02-10 PROCEDURE — 99999 PR PBB SHADOW E&M-EST. PATIENT-LVL III: CPT | Mod: PBBFAC,,, | Performed by: FAMILY MEDICINE

## 2020-02-10 PROCEDURE — 36415 COLL VENOUS BLD VENIPUNCTURE: CPT | Mod: PO

## 2020-02-10 PROCEDURE — 99214 OFFICE O/P EST MOD 30 MIN: CPT | Mod: S$GLB,,, | Performed by: FAMILY MEDICINE

## 2020-02-10 PROCEDURE — 80061 LIPID PANEL: CPT

## 2020-02-10 PROCEDURE — 80048 BASIC METABOLIC PNL TOTAL CA: CPT

## 2020-02-10 PROCEDURE — 3008F PR BODY MASS INDEX (BMI) DOCUMENTED: ICD-10-PCS | Mod: CPTII,S$GLB,, | Performed by: FAMILY MEDICINE

## 2020-02-10 PROCEDURE — 99214 PR OFFICE/OUTPT VISIT, EST, LEVL IV, 30-39 MIN: ICD-10-PCS | Mod: S$GLB,,, | Performed by: FAMILY MEDICINE

## 2020-02-10 PROCEDURE — 3008F BODY MASS INDEX DOCD: CPT | Mod: CPTII,S$GLB,, | Performed by: FAMILY MEDICINE

## 2020-02-10 PROCEDURE — 99999 PR PBB SHADOW E&M-EST. PATIENT-LVL III: ICD-10-PCS | Mod: PBBFAC,,, | Performed by: FAMILY MEDICINE

## 2020-02-10 PROCEDURE — 83036 HEMOGLOBIN GLYCOSYLATED A1C: CPT

## 2020-02-10 NOTE — PROGRESS NOTES
CC: 1 month post-ascending aortic aneurysm repair    HPI: Patient reports that he is healing great.  He has already followed up with his surgeon, and reported that the surgeon does not think he needs an echocardiogram for the next 5 years.  Since his surgery he has made lifestyle changes in his diet of portion control, eating more vegetables, and trying intermittent fasting.  He has quit smoking completely, started walking 3 miles per day, and has lost 12 lbs.  He is a regular blood donor and would like to know when he can donate again.    ROS:  Cardio: Can feel heart pumping at rest and it wakes him up at night; reports that carotid pulse is so strong that it causes his head to ghanshyam.  Sneezing causes him chest pain and has tried to suppress all coughs since the surgery.  Eyes: He occasionally sees floaters since his surgery.  He has an appointment with an eye doctor later this week.  Weight: He has lost 12 lbs over 2 months, and states that his appetite is returning to normal since his surgery.  Head: Reports lightheadedness when rise from sitting too quickly, so he now does this slower.  He once measured his blood pressure during a lightheaded episode and the cuff read 82/61  Answers for HPI/ROS submitted by the patient on 2/7/2020   activity change: No  unexpected weight change: No  neck pain: No  hearing loss: No  rhinorrhea: No  trouble swallowing: No  eye discharge: No  visual disturbance: No  chest tightness: No  wheezing: No  chest pain: No  palpitations: No  blood in stool: No  constipation: No  vomiting: No  diarrhea: No  polydipsia: No  polyuria: No  difficulty urinating: No  urgency: No  hematuria: No  joint swelling: No  arthralgias: No  headaches: No  weakness: No  confusion: No  dysphoric mood: No        Meds: 12.5 mg of metoprolol 2x/day, aspirin, fish oil, B-Complex, Biotin  Allergies: Codeine, oxycodone-- Both make him itchy  PMHx: Diverticulosis (prev colonoscopy in 2019), high cholesterol,  "Bicupsid Aortic valve  PSHx: Ascending aortic aneurysm repair (Jan 2020), Hernia Repair x2  PSocHx: Quit smoking Jan 6, Monogamous with girlfriend of 7 years, Drink 1 glass wine/day and 1-4 shots of hard liquor 1x/week, Diet is improving with portion control, Works full-time at [employer], Former     O/E  /64 (BP Location: Left arm, Patient Position: Sitting, BP Method: Large (Automatic))   Pulse 65   Ht 5' 10" (1.778 m)   Wt 91.2 kg (201 lb)   BMI 28.84 kg/m²     Heart: Regular rate and rhythm, no added sounds, no carotid bruits  Lungs: Breath sounds clear bilaterally, no extra sounds  Abdomen: Bowel sounds present in all 4 quadrants, no tenderness, diastasis recti present  Chest: Scars in process of healing from sternotomy and where draining tubes once were present  Extremities: No ankle edema present    Assessment:  Appears to be healing well from surgery and performing lifestyle changes to improve his cardiac health  Episodes of hypotension may be due to metoprolol dosing    1. Status post thoracic aortic aneurysm repair     2. Lightheadedness  CBC auto differential   3. Hypotension, unspecified hypotension type     4. Coronary artery disease involving native coronary artery of native heart without angina pectoris  Lipid panel   5. Pre-diabetes  Basic metabolic panel   6. Hypercholesterolemia  Lipid panel   7. Laboratory exam ordered as part of routine general medical examination           Plan:  Continue to encourage lifestyle changes  Consider lowering dose of metoprolol  Order CBC, cholesterol, A1c, CMP testing  Advise on blood donation and addition of statin after return of results    Orders Placed This Encounter    CBC auto differential    Basic metabolic panel    Lipid panel     We will call the patient with results & make further recommendations at that time.    I attest that I have reviewed the student note and that the components of the history of present illness, " physical exam, and assessment/plan documented were performed by me or were performed in my presence by the student where verified the documentation and performed (re-performed) the exam and medical decision-making.      Polo Osman Jr., MD

## 2020-02-10 NOTE — PATIENT INSTRUCTIONS
Guillermo,     We are always striving for excellence. Should you receive a patient experience survey electronically or by mail, we would appreciate if you would take a few moments to give us your feedback. These surveys let us know our strengths as well as areas of opportunity for improvement to better serve you.    Thank you for your time,  Dayana Mock LPN    Your test results will be communicated to you via : [facility], Telephone or Letter.   If you have not received your test results in one week, please contact the clinic at [phone].

## 2020-02-11 ENCOUNTER — PATIENT MESSAGE (OUTPATIENT)
Dept: FAMILY MEDICINE | Facility: CLINIC | Age: 60
End: 2020-02-11

## 2020-02-15 ENCOUNTER — PATIENT MESSAGE (OUTPATIENT)
Dept: CARDIOTHORACIC SURGERY | Facility: CLINIC | Age: 60
End: 2020-02-15

## 2020-02-15 NOTE — TELEPHONE ENCOUNTER
Please continue with Soap and water cleaning 2 x per day... You may cover it with a small gauze to prevent soiling clothes... No oitnments ....     Thanks..... Marin

## 2020-02-28 ENCOUNTER — PATIENT MESSAGE (OUTPATIENT)
Dept: FAMILY MEDICINE | Facility: CLINIC | Age: 60
End: 2020-02-28

## 2020-03-17 ENCOUNTER — PATIENT MESSAGE (OUTPATIENT)
Dept: FAMILY MEDICINE | Facility: CLINIC | Age: 60
End: 2020-03-17

## 2020-03-19 ENCOUNTER — PATIENT MESSAGE (OUTPATIENT)
Dept: FAMILY MEDICINE | Facility: CLINIC | Age: 60
End: 2020-03-19

## 2020-04-21 ENCOUNTER — PATIENT MESSAGE (OUTPATIENT)
Dept: FAMILY MEDICINE | Facility: CLINIC | Age: 60
End: 2020-04-21

## 2020-04-22 ENCOUNTER — PATIENT MESSAGE (OUTPATIENT)
Dept: FAMILY MEDICINE | Facility: CLINIC | Age: 60
End: 2020-04-22

## 2020-04-23 ENCOUNTER — PATIENT MESSAGE (OUTPATIENT)
Dept: FAMILY MEDICINE | Facility: CLINIC | Age: 60
End: 2020-04-23

## 2020-04-23 NOTE — TELEPHONE ENCOUNTER
There were multiple message a portal message from the patient yesterday morning. One of the messages stated  he would like to have the first available virtual appt with Dr. Osman. Therefore I scheduled the patient for an 8am virtual. Patient's virtual appt has been cancelled.

## 2020-05-12 ENCOUNTER — LAB VISIT (OUTPATIENT)
Dept: PRIMARY CARE CLINIC | Facility: CLINIC | Age: 60
End: 2020-05-12
Payer: COMMERCIAL

## 2020-05-12 DIAGNOSIS — Z00.6 RESEARCH STUDY PATIENT: Primary | ICD-10-CM

## 2020-05-12 LAB — SARS-COV-2 IGG SERPLBLD QL IA.RAPID: NEGATIVE

## 2020-05-12 PROCEDURE — 86769 SARS-COV-2 COVID-19 ANTIBODY: CPT

## 2020-05-12 PROCEDURE — U0002 COVID-19 LAB TEST NON-CDC: HCPCS

## 2020-05-12 NOTE — RESEARCH
Date of Consent: [id number]    Sponsor: [facility]    Study Title/IRB Number: Observational study of Sars-CoV2 Immunoglobulin G (IgG) seroprevalence among the [place] population over time 2020.163  Principle Investigator: Francesca Morse, PhD    Did the patient need translation services? No   name: N/A    Prior to the Informed Consent (IC) being signed, or any study protocol required data collection, testing, procedure, or intervention being performed, the following was done and/or discussed:   Patient was given a paper copy of the IC for review    Patient was given study FAQ   Purpose of the study and qualifications to participate    Study design and tests or procedures done at this visit   Confidentiality and HIPAA Authorization for Release of Medical Records for the research trial/ subject's rights/research related injury   Risk, Benefits, Alternative Treatments, Compensation and Costs   Participation in the research trial is voluntary and patient may withdraw at anytime   Contact information for study related questions    Patient verbalizes understanding of the above: Yes  Contact information for PI and IRB given to patient: Yes  Patient able to adequately summarize: the purpose of the study, the risks associated with the study, and all procedures, testing, and follow-ups associated with the study: Yes    The consent was discussed verbally with the patient and all questions were answered satisfactorily. Patient gave verbal consent for the Seroprevalence research study with an IRB approval date of 05/08/2020.      The Consent, Consent Witness and name of Clinical Research Coordinator consenting was captured and documented in REDCap.    All Inclusion and Exclusion Criteria reviewed, subject meets all Inclusion criteria and does not meet any Exclusion Criteria at this time.     Patient Eligibility was confirmed.    Patient responded to survey questions.    The following biospecimen  collection procedures were collected:    -Nasopharyngeal Swab Collection  -Blood collection

## 2020-05-13 LAB — SARS-COV-2 RNA RESP QL NAA+PROBE: NOT DETECTED

## 2020-07-15 ENCOUNTER — PATIENT MESSAGE (OUTPATIENT)
Dept: FAMILY MEDICINE | Facility: CLINIC | Age: 60
End: 2020-07-15

## 2020-07-15 ENCOUNTER — TELEPHONE (OUTPATIENT)
Dept: FAMILY MEDICINE | Facility: CLINIC | Age: 60
End: 2020-07-15

## 2020-07-15 NOTE — TELEPHONE ENCOUNTER
Left a voicemail informing the patient that he needs to always communicate with Dr. Osman via the patient portal per Dr. Osman's request. Dr. Osman states patient's concerns can be addressed in a timely manner by utilizing the patient portal .

## 2020-07-27 ENCOUNTER — PATIENT MESSAGE (OUTPATIENT)
Dept: FAMILY MEDICINE | Facility: CLINIC | Age: 60
End: 2020-07-27

## 2020-07-27 DIAGNOSIS — G56.03 BILATERAL CARPAL TUNNEL SYNDROME: Primary | ICD-10-CM

## 2020-09-10 ENCOUNTER — OFFICE VISIT (OUTPATIENT)
Dept: FAMILY MEDICINE | Facility: CLINIC | Age: 60
End: 2020-09-10
Attending: FAMILY MEDICINE
Payer: COMMERCIAL

## 2020-09-10 VITALS
WEIGHT: 206 LBS | HEIGHT: 70 IN | HEART RATE: 77 BPM | DIASTOLIC BLOOD PRESSURE: 60 MMHG | BODY MASS INDEX: 29.49 KG/M2 | OXYGEN SATURATION: 96 % | SYSTOLIC BLOOD PRESSURE: 100 MMHG

## 2020-09-10 DIAGNOSIS — Z91.89 FRAMINGHAM CARDIAC RISK 10-20% IN NEXT 10 YEARS: ICD-10-CM

## 2020-09-10 DIAGNOSIS — N20.0 KIDNEY STONES: ICD-10-CM

## 2020-09-10 DIAGNOSIS — E78.00 HYPERCHOLESTEROLEMIA: ICD-10-CM

## 2020-09-10 DIAGNOSIS — R73.03 PRE-DIABETES: ICD-10-CM

## 2020-09-10 DIAGNOSIS — Z01.818 PREOP EXAMINATION: Primary | ICD-10-CM

## 2020-09-10 DIAGNOSIS — Z98.890 STATUS POST THORACIC AORTIC ANEURYSM REPAIR: ICD-10-CM

## 2020-09-10 DIAGNOSIS — I25.10 CORONARY ARTERY DISEASE INVOLVING NATIVE CORONARY ARTERY OF NATIVE HEART WITHOUT ANGINA PECTORIS: ICD-10-CM

## 2020-09-10 DIAGNOSIS — Z86.79 STATUS POST THORACIC AORTIC ANEURYSM REPAIR: ICD-10-CM

## 2020-09-10 PROCEDURE — 99214 OFFICE O/P EST MOD 30 MIN: CPT | Mod: S$GLB,,, | Performed by: FAMILY MEDICINE

## 2020-09-10 PROCEDURE — 93010 ELECTROCARDIOGRAM REPORT: CPT | Mod: S$GLB,,, | Performed by: INTERNAL MEDICINE

## 2020-09-10 PROCEDURE — 99999 PR PBB SHADOW E&M-EST. PATIENT-LVL III: CPT | Mod: PBBFAC,,, | Performed by: FAMILY MEDICINE

## 2020-09-10 PROCEDURE — 99999 PR PBB SHADOW E&M-EST. PATIENT-LVL III: ICD-10-PCS | Mod: PBBFAC,,, | Performed by: FAMILY MEDICINE

## 2020-09-10 PROCEDURE — 93010 EKG 12-LEAD: ICD-10-PCS | Mod: S$GLB,,, | Performed by: INTERNAL MEDICINE

## 2020-09-10 PROCEDURE — 99214 PR OFFICE/OUTPT VISIT, EST, LEVL IV, 30-39 MIN: ICD-10-PCS | Mod: S$GLB,,, | Performed by: FAMILY MEDICINE

## 2020-09-10 PROCEDURE — 93005 EKG 12-LEAD: ICD-10-PCS | Mod: S$GLB,,, | Performed by: FAMILY MEDICINE

## 2020-09-10 PROCEDURE — 93005 ELECTROCARDIOGRAM TRACING: CPT | Mod: S$GLB,,, | Performed by: FAMILY MEDICINE

## 2020-09-10 RX ORDER — CICLOPIROX OLAMINE 7.7 MG/100ML
SUSPENSION TOPICAL
COMMUNITY
Start: 2020-08-21 | End: 2020-12-16

## 2020-09-10 RX ORDER — ZINC SULFATE 50(220)MG
220 CAPSULE ORAL DAILY
COMMUNITY
Start: 2020-04-06

## 2020-09-10 RX ORDER — TURMERIC 400 MG
1 CAPSULE ORAL DAILY
COMMUNITY
Start: 2020-04-01

## 2020-09-10 RX ORDER — BETAMETHASONE DIPROPIONATE 0.5 MG/G
CREAM TOPICAL
COMMUNITY
Start: 2020-08-21

## 2020-09-10 RX ORDER — ASCORBIC ACID 500 MG
500 TABLET ORAL DAILY
COMMUNITY
Start: 2020-04-06

## 2020-09-10 NOTE — PROGRESS NOTES
Subjective:     Guillermo Gee is a 59 y.o. male who presents to the office today for a preoperative consultation at the request of surgeon Dr. Heber Anguiano who plans on performing right carpal tunnel release on September 11. This consultation is requested for the specific conditions prompting preoperative evaluation (i.e. because of potential affect on operative risk): medical clearance. Planned anesthesia: local and MAC. The patient has the following known anesthesia issues: none. Patients bleeding risk: no recent abnormal bleeding, no remote history of abnormal bleeding and no use of Ca-channel blockers. Patient does not have objections to receiving blood products if needed.    Patient Active Problem List   Diagnosis    Diverticulosis    Family history of prostate cancer in father    Kidney stones    Hypercholesterolemia    [place] cardiac risk 10-20% in next 10 years    Status post thoracic aortic aneurysm repair    Bicuspid aortic valve    CAD (coronary artery disease)    Pre-diabetes       Past Surgical History:   Procedure Laterality Date    INGUINAL HERNIA REPAIR Left     LEFT HEART CATHETERIZATION Left 12/19/2019    Procedure: CATHETERIZATION, HEART, LEFT;  Surgeon: John Kyle MD;  Location: [facility] CATH LAB;  Service: Cardiology;  Laterality: Left;    REPLACEMENT OF ASCENDING AORTA N/A 1/6/2020    Procedure: REPLACEMENT, AORTA, ASCENDING;  Surgeon: Allen Baez MD;  Location: [facility] OR [address] [place] [address];  Service: Cardiothoracic;  Laterality: N/A;    SHOULDER ARTHROSCOPY W/ ROTATOR CUFF REPAIR Bilateral 2011, 2012    UMBILICAL HERNIA REPAIR           Current Outpatient Medications:     ascorbic acid, vitamin C, (VITAMIN C) 500 MG tablet, , Disp: , Rfl:     aspirin 325 MG tablet, Take 1 tablet (325 mg total) by mouth once daily., Disp: 90 tablet, Rfl: 3    b complex vitamins tablet, Take 1 tablet by mouth once daily., Disp: , Rfl:     betamethasone dipropionate (DIPROLENE) 0.05 %  cream, , Disp: , Rfl:     biotin/calcium carbonate (BIOTIN 100+10 ORAL), Take by mouth., Disp: , Rfl:     omega-3 fatty acids/fish oil (FISH OIL-OMEGA-3 FATTY ACIDS) 300-1,000 mg capsule, Take 2 capsules by mouth once daily., Disp: , Rfl:     turmeric 400 mg Cap, , Disp: , Rfl:     zinc sulfate (ZINC-220) 220 (50) mg capsule, , Disp: , Rfl:     ciclopirox (LOPROX) 0.77 % Susp, , Disp: , Rfl:     Review of patient's allergies indicates:   Allergen Reactions    Codeine Itching       Family History   Problem Relation Age of Onset    No Known Problems Mother     Prostate cancer Father     No Known Problems Sister     No Known Problems Brother     Colon cancer Paternal Uncle     No Known Problems Brother        Social History     Socioeconomic History    Marital status: Single     Spouse name: Not on file    Number of children: Not on file    Years of education: Not on file    Highest education level: Not on file   Occupational History    Occupation:      Comment: [employer]   Social Needs    Financial resource strain: Not hard at all    Food insecurity     Worry: Never true     Inability: Never true    Transportation needs     Medical: No     Non-medical: No   Tobacco Use    Smoking status: Current Some Day Smoker     Types: Cigarettes     Start date: 12/27/1981    Smokeless tobacco: Never Used    Tobacco comment: occ cig use: ~ 3/day   Substance and Sexual Activity    Alcohol use: Yes     Alcohol/week: 6.0 - 8.0 standard drinks     Types: 3 - 4 Glasses of wine, 3 - 4 Standard drinks or equivalent per week     Frequency: 2-3 times a week     Drinks per session: 1 or 2     Binge frequency: Less than monthly    Drug use: Never    Sexual activity: Yes     Partners: Female   Lifestyle    Physical activity     Days per week: 0 days     Minutes per session: Not on file    Stress: Rather much   Relationships    Social connections     Talks on phone: More than three times a week      "Gets together: Once a week     Attends [employer] service: More than 4 times per year     Active member of club or organization: Yes     Attends meetings of clubs or organizations: More than 4 times per year     Relationship status:    Other Topics Concern    Not on file   Social History Narrative    He is not satisfied with weight.    Rates diet as poor.    He does not drink at least 1/2 gallon water daily.    He drinks 3-4 coffee/tea/caffeine-containing soft drinks daily.    Total sleep time at night is 6 hours.    He works 50-60 hours per week.    He does wear seat belts.    Hobbies include singing, fishing.       Patient Care Team:  Polo Osman Jr., MD as PCP - General (Family Medicine)  Francis Kaur MD as Consulting Physician (Gastroenterology)  Shine Garcia MD as Consulting Physician (Orthopedic Surgery)    Review of Systems  A comprehensive review of systems was negative.      Objective:      /60   Pulse 77   Ht 5' 10" (1.778 m)   Wt 93.4 kg (206 lb)   SpO2 96%   BMI 29.56 kg/m²     General Appearance:    Alert, cooperative, no distress, appears stated age   Head:    Normocephalic, without obvious abnormality, atraumatic   Eyes:    PERRL, conjunctiva/corneas clear, EOM's intact, fundi     benign, both eyes        Ears:    Normal TM's and external ear canals, both ears   Nose:   Nares normal, septum midline, mucosa normal, no drainage    or sinus tenderness   Throat:   Lips, mucosa, and tongue normal; teeth and gums normal   Neck:   Supple, symmetrical, trachea midline, no adenopathy;        thyroid:  No enlargement/tenderness/nodules; no carotid    bruit or JVD   Back:     Symmetric, no curvature, ROM normal, no CVA tenderness   Lungs:     Clear to auscultation bilaterally, respirations unlabored   Chest wall:    No tenderness or deformity   Heart:    Regular rate and rhythm, S1 and S2 normal, no murmur, rub   or gallop   Abdomen:     Soft, non-tender, bowel sounds active all " four quadrants,     no masses, no organomegaly   Extremities:   Extremities normal, atraumatic, no cyanosis or edema   Pulses:   2+ and symmetric all extremities   Skin:   Skin color, texture, turgor normal, no rashes or lesions   Lymph nodes:   Cervical, supraclavicular, and axillary nodes normal   Neurologic:   CNII-XII intact. Normal strength, sensation and reflexes       throughout         Cardiographics  ECG 9/10/2020:  Normal sinus rhythm; minimal voltage criteria for LVH.    Imaging  Chest x-ray 12/11/2019: Heart size is normal lungs are clear and the bones show mild DJD.    Lab Review   The patient left the office today prior to having laboratory testing performed.     Assessment:       1. Preop examination    2. Pre-diabetes    3. Hypercholesterolemia    4. Coronary artery disease involving native coronary artery of native heart without angina pectoris    5. Status post thoracic aortic aneurysm repair    6. Kidney stones    7. [place] cardiac risk 10-20% in next 10 years        59 y.o. male with planned surgery as above.    Known risk factors for perioperative complications: Coronary disease      Cardiac Risk Estimation: 0.14%    Current medications which may produce withdrawal symptoms if withheld perioperatively: none      Plan:      1. Preoperative workup as follows BMP.  2. Change in medication regimen before surgery: discontinue ASA 14 days before surgery and discontinue NSAIDs (OTC) 14 days before surgery.  3. Prophylaxis for cardiac events with perioperative beta-blockers: not indicated.  4. Invasive hemodynamic monitoring perioperatively: not indicated.  5. Deep vein thrombosis prophylaxis postoperatively:regimen to be chosen by surgical team.  6. Surveillance for postoperative MI with ECG immediately postoperatively and on postoperative days 1 and 2 AND troponin levels 24 hours postoperatively and on day 4 or hospital discharge (whichever comes first): not indicated.    Recommend obtaining BMP  prior to procedure.  If within normal limits, then patient is cleared for surgery.        Polo Osman Jr., MD

## 2020-09-18 ENCOUNTER — LAB VISIT (OUTPATIENT)
Dept: LAB | Facility: HOSPITAL | Age: 60
End: 2020-09-18
Attending: FAMILY MEDICINE
Payer: COMMERCIAL

## 2020-09-18 ENCOUNTER — PATIENT MESSAGE (OUTPATIENT)
Dept: FAMILY MEDICINE | Facility: CLINIC | Age: 60
End: 2020-09-18

## 2020-09-18 DIAGNOSIS — R73.03 PRE-DIABETES: ICD-10-CM

## 2020-09-18 LAB
ANION GAP SERPL CALC-SCNC: 7 MMOL/L (ref 8–16)
BUN SERPL-MCNC: 18 MG/DL (ref 6–20)
CALCIUM SERPL-MCNC: 9.2 MG/DL (ref 8.7–10.5)
CHLORIDE SERPL-SCNC: 100 MMOL/L (ref 95–110)
CO2 SERPL-SCNC: 28 MMOL/L (ref 23–29)
CREAT SERPL-MCNC: 1.2 MG/DL (ref 0.5–1.4)
EST. GFR  (AFRICAN AMERICAN): >60 ML/MIN/1.73 M^2
EST. GFR  (NON AFRICAN AMERICAN): >60 ML/MIN/1.73 M^2
ESTIMATED AVG GLUCOSE: 117 MG/DL (ref 68–131)
GLUCOSE SERPL-MCNC: 98 MG/DL (ref 70–110)
HBA1C MFR BLD HPLC: 5.7 % (ref 4–5.6)
POTASSIUM SERPL-SCNC: 5 MMOL/L (ref 3.5–5.1)
SODIUM SERPL-SCNC: 135 MMOL/L (ref 136–145)

## 2020-09-18 PROCEDURE — 80048 BASIC METABOLIC PNL TOTAL CA: CPT

## 2020-09-18 PROCEDURE — 83036 HEMOGLOBIN GLYCOSYLATED A1C: CPT

## 2020-09-18 PROCEDURE — 36415 COLL VENOUS BLD VENIPUNCTURE: CPT | Mod: PO

## 2020-09-19 ENCOUNTER — PATIENT MESSAGE (OUTPATIENT)
Dept: CARDIOTHORACIC SURGERY | Facility: CLINIC | Age: 60
End: 2020-09-19

## 2020-09-19 RX ORDER — POTASSIUM CHLORIDE 20 MEQ/1
TABLET, EXTENDED RELEASE ORAL
COMMUNITY
Start: 2020-09-11 | End: 2020-12-09

## 2020-09-19 RX ORDER — OXYCODONE AND ACETAMINOPHEN 5; 325 MG/1; MG/1
TABLET ORAL
COMMUNITY
Start: 2020-09-11 | End: 2020-09-18

## 2020-09-19 RX ORDER — FUROSEMIDE 20 MG/1
TABLET ORAL
COMMUNITY
Start: 2020-09-11 | End: 2020-12-09

## 2020-09-19 RX ORDER — ONDANSETRON 4 MG/1
TABLET, FILM COATED ORAL
COMMUNITY
Start: 2020-09-11 | End: 2020-09-18

## 2020-09-28 ENCOUNTER — PATIENT MESSAGE (OUTPATIENT)
Dept: CARDIOTHORACIC SURGERY | Facility: CLINIC | Age: 60
End: 2020-09-28

## 2020-10-01 ENCOUNTER — PATIENT MESSAGE (OUTPATIENT)
Dept: CARDIOTHORACIC SURGERY | Facility: CLINIC | Age: 60
End: 2020-10-01

## 2020-12-03 ENCOUNTER — PATIENT MESSAGE (OUTPATIENT)
Dept: FAMILY MEDICINE | Facility: CLINIC | Age: 60
End: 2020-12-03

## 2020-12-03 ENCOUNTER — PATIENT MESSAGE (OUTPATIENT)
Dept: CARDIOTHORACIC SURGERY | Facility: CLINIC | Age: 60
End: 2020-12-03

## 2020-12-03 NOTE — TELEPHONE ENCOUNTER
Dilipfabian,  I am sorry to hear about your visual disturbances. Please follow the advice of your Opthamologist.   The opthalmology office staff can facilitate scheduling from their their physician's orders.

## 2020-12-04 ENCOUNTER — PATIENT MESSAGE (OUTPATIENT)
Dept: CARDIOLOGY | Facility: CLINIC | Age: 60
End: 2020-12-04

## 2020-12-05 ENCOUNTER — PATIENT MESSAGE (OUTPATIENT)
Dept: FAMILY MEDICINE | Facility: CLINIC | Age: 60
End: 2020-12-05

## 2020-12-05 DIAGNOSIS — G45.3 AMAUROSIS FUGAX: Primary | ICD-10-CM

## 2020-12-06 ENCOUNTER — NURSE TRIAGE (OUTPATIENT)
Dept: ADMINISTRATIVE | Facility: CLINIC | Age: 60
End: 2020-12-06

## 2020-12-06 ENCOUNTER — PATIENT MESSAGE (OUTPATIENT)
Dept: CARDIOTHORACIC SURGERY | Facility: CLINIC | Age: 60
End: 2020-12-06

## 2020-12-06 NOTE — TELEPHONE ENCOUNTER
Spoke with pt:  Had vision loss temp on Wednesday. Saw eye MD on Thursday. Retinol scans done. Tiny embolisms in veins were seen. Recommended to have  carotid artery US- had heart sx in January 2020.  No new problems since sx except for this episode. Emailed Rx for imaging order to central scheduling Radha Garcia, yesterday Dr Osman placed the order for carotid artery US as well. Pt still  has heard nothing from central scheduling. Has been told by other MDs that if cannot get US early Monday to go to ED. Pt states he can come Monday early. instructed pt will send message to Dr Baez's and Alexandra's office. verbalizes understanding.

## 2020-12-06 NOTE — TELEPHONE ENCOUNTER
Reason for Disposition   Nursing judgment or information in reference    Protocols used: NO GUIDELINE [id number]

## 2020-12-07 ENCOUNTER — PATIENT MESSAGE (OUTPATIENT)
Dept: CARDIOTHORACIC SURGERY | Facility: CLINIC | Age: 60
End: 2020-12-07

## 2020-12-07 ENCOUNTER — PATIENT MESSAGE (OUTPATIENT)
Dept: FAMILY MEDICINE | Facility: CLINIC | Age: 60
End: 2020-12-07

## 2020-12-07 ENCOUNTER — HOSPITAL ENCOUNTER (OUTPATIENT)
Dept: RADIOLOGY | Facility: OTHER | Age: 60
Discharge: HOME | End: 2020-12-07
Attending: FAMILY MEDICINE
Payer: COMMERCIAL

## 2020-12-07 DIAGNOSIS — G45.3 AMAUROSIS FUGAX: ICD-10-CM

## 2020-12-07 PROCEDURE — 93880 EXTRACRANIAL BILAT STUDY: CPT | Mod: TC

## 2020-12-07 PROCEDURE — 93880 US CAROTID BILATERAL: ICD-10-PCS | Mod: 26,,, | Performed by: RADIOLOGY

## 2020-12-07 PROCEDURE — 93880 EXTRACRANIAL BILAT STUDY: CPT | Mod: 26,,, | Performed by: RADIOLOGY

## 2020-12-08 ENCOUNTER — PATIENT MESSAGE (OUTPATIENT)
Dept: FAMILY MEDICINE | Facility: CLINIC | Age: 60
End: 2020-12-08

## 2020-12-08 ENCOUNTER — PATIENT MESSAGE (OUTPATIENT)
Dept: CARDIOLOGY | Facility: CLINIC | Age: 60
End: 2020-12-08

## 2020-12-08 RX ORDER — SULFAMETHOXAZOLE AND TRIMETHOPRIM 800; 160 MG/1; MG/1
TABLET ORAL
COMMUNITY
Start: 2020-11-20 | End: 2020-11-25

## 2020-12-08 RX ORDER — OXYCODONE AND ACETAMINOPHEN 7.5; 325 MG/1; MG/1
TABLET ORAL
COMMUNITY
Start: 2020-11-20 | End: 2020-11-24

## 2020-12-08 RX ORDER — ONDANSETRON 4 MG/1
TABLET, FILM COATED ORAL
COMMUNITY
Start: 2020-11-20 | End: 2020-11-25

## 2020-12-09 ENCOUNTER — LAB VISIT (OUTPATIENT)
Dept: LAB | Facility: HOSPITAL | Age: 60
End: 2020-12-09
Attending: FAMILY MEDICINE
Payer: COMMERCIAL

## 2020-12-09 ENCOUNTER — PATIENT MESSAGE (OUTPATIENT)
Dept: FAMILY MEDICINE | Facility: CLINIC | Age: 60
End: 2020-12-09

## 2020-12-09 ENCOUNTER — OFFICE VISIT (OUTPATIENT)
Dept: FAMILY MEDICINE | Facility: CLINIC | Age: 60
End: 2020-12-09
Attending: FAMILY MEDICINE
Payer: COMMERCIAL

## 2020-12-09 VITALS
OXYGEN SATURATION: 95 % | HEIGHT: 70 IN | SYSTOLIC BLOOD PRESSURE: 100 MMHG | DIASTOLIC BLOOD PRESSURE: 72 MMHG | BODY MASS INDEX: 29.78 KG/M2 | HEART RATE: 81 BPM | WEIGHT: 208 LBS

## 2020-12-09 DIAGNOSIS — G45.3 AMAUROSIS FUGAX OF LEFT EYE: Primary | ICD-10-CM

## 2020-12-09 DIAGNOSIS — G45.3 AMAUROSIS FUGAX OF LEFT EYE: ICD-10-CM

## 2020-12-09 DIAGNOSIS — E78.00 HYPERCHOLESTEROLEMIA: ICD-10-CM

## 2020-12-09 DIAGNOSIS — Z86.79 STATUS POST THORACIC AORTIC ANEURYSM REPAIR: ICD-10-CM

## 2020-12-09 DIAGNOSIS — Z98.890 STATUS POST THORACIC AORTIC ANEURYSM REPAIR: ICD-10-CM

## 2020-12-09 DIAGNOSIS — I63.89 OTHER CEREBRAL INFARCTION: ICD-10-CM

## 2020-12-09 DIAGNOSIS — I25.10 CORONARY ARTERY DISEASE INVOLVING NATIVE CORONARY ARTERY OF NATIVE HEART WITHOUT ANGINA PECTORIS: ICD-10-CM

## 2020-12-09 DIAGNOSIS — R73.03 PRE-DIABETES: ICD-10-CM

## 2020-12-09 LAB — ERYTHROCYTE [SEDIMENTATION RATE] IN BLOOD BY WESTERGREN METHOD: 6 MM/HR (ref 0–23)

## 2020-12-09 PROCEDURE — 99999 PR PBB SHADOW E&M-EST. PATIENT-LVL III: ICD-10-PCS | Mod: PBBFAC,,, | Performed by: FAMILY MEDICINE

## 2020-12-09 PROCEDURE — 99215 OFFICE O/P EST HI 40 MIN: CPT | Mod: S$GLB,,, | Performed by: FAMILY MEDICINE

## 2020-12-09 PROCEDURE — 36415 COLL VENOUS BLD VENIPUNCTURE: CPT | Mod: PO

## 2020-12-09 PROCEDURE — 1126F PR PAIN SEVERITY QUANTIFIED, NO PAIN PRESENT: ICD-10-PCS | Mod: S$GLB,,, | Performed by: FAMILY MEDICINE

## 2020-12-09 PROCEDURE — 1126F AMNT PAIN NOTED NONE PRSNT: CPT | Mod: S$GLB,,, | Performed by: FAMILY MEDICINE

## 2020-12-09 PROCEDURE — 99215 PR OFFICE/OUTPT VISIT, EST, LEVL V, 40-54 MIN: ICD-10-PCS | Mod: S$GLB,,, | Performed by: FAMILY MEDICINE

## 2020-12-09 PROCEDURE — 85652 RBC SED RATE AUTOMATED: CPT

## 2020-12-09 PROCEDURE — 83036 HEMOGLOBIN GLYCOSYLATED A1C: CPT

## 2020-12-09 PROCEDURE — 3008F PR BODY MASS INDEX (BMI) DOCUMENTED: ICD-10-PCS | Mod: CPTII,S$GLB,, | Performed by: FAMILY MEDICINE

## 2020-12-09 PROCEDURE — 3008F BODY MASS INDEX DOCD: CPT | Mod: CPTII,S$GLB,, | Performed by: FAMILY MEDICINE

## 2020-12-09 PROCEDURE — 80053 COMPREHEN METABOLIC PANEL: CPT

## 2020-12-09 PROCEDURE — 99999 PR PBB SHADOW E&M-EST. PATIENT-LVL III: CPT | Mod: PBBFAC,,, | Performed by: FAMILY MEDICINE

## 2020-12-09 PROCEDURE — 86140 C-REACTIVE PROTEIN: CPT

## 2020-12-09 PROCEDURE — 80061 LIPID PANEL: CPT

## 2020-12-09 RX ORDER — ZOSTER VACCINE RECOMBINANT, ADJUVANTED 50 MCG/0.5
0.5 KIT INTRAMUSCULAR ONCE
Qty: 1 EACH | Refills: 0 | Status: SHIPPED | OUTPATIENT
Start: 2020-12-09 | End: 2020-12-09

## 2020-12-09 RX ORDER — ROSUVASTATIN CALCIUM 20 MG/1
20 TABLET, COATED ORAL DAILY
Qty: 90 TABLET | Refills: 3 | Status: SHIPPED | OUTPATIENT
Start: 2020-12-09 | End: 2021-03-10 | Stop reason: SDUPTHER

## 2020-12-09 NOTE — PROGRESS NOTES
Subjective:       Patient ID: Guillermo Gee is a 59 y.o. male.    Chief Complaint: No chief complaint on file.    HPI   I have been corresponding with the patient through patient portal over the past week, CRNA carotid ultrasound ordered by his ophthalmologist following episode of amaurosis fugax.  Laboratory testing was ordered; his ophthalmologist did not recommend that he follow-up with me, but rather his cardiothoracic surgeon.      Patient Active Problem List   Diagnosis    Diverticulosis    Family history of prostate cancer in father    Kidney stones    Hypercholesterolemia    [place] cardiac risk 10-20% in next 10 years    Status post thoracic aortic aneurysm repair    Bicuspid aortic valve    CAD (coronary artery disease)    Pre-diabetes         Current Outpatient Medications:     ascorbic acid, vitamin C, (VITAMIN C) 500 MG tablet, , Disp: , Rfl:     aspirin 325 MG tablet, Take 1 tablet (325 mg total) by mouth once daily., Disp: 90 tablet, Rfl: 3    b complex vitamins tablet, Take 1 tablet by mouth once daily., Disp: , Rfl:     betamethasone dipropionate (DIPROLENE) 0.05 % cream, , Disp: , Rfl:     biotin/calcium carbonate (BIOTIN 100+10 ORAL), Take by mouth., Disp: , Rfl:     omega-3 fatty acids/fish oil (FISH OIL-OMEGA-3 FATTY ACIDS) 300-1,000 mg capsule, Take 2 capsules by mouth once daily., Disp: , Rfl:     turmeric 400 mg Cap, , Disp: , Rfl:     zinc sulfate (ZINC-220) 220 (50) mg capsule, , Disp: , Rfl:     ciclopirox (LOPROX) 0.77 % Susp, , Disp: , Rfl:     The following portions of the patient's history were reviewed and updated as appropriate: allergies, past family history, past medical history, past social history and past surgical history.    Review of Systems   Constitutional: Negative for activity change and unexpected weight change.   HENT: Negative for hearing loss, rhinorrhea and trouble swallowing.    Eyes: Positive for visual disturbance. Negative for discharge.  "  Respiratory: Negative for chest tightness and wheezing.    Cardiovascular: Negative for chest pain and palpitations.   Gastrointestinal: Negative for blood in stool, constipation, diarrhea and vomiting.   Endocrine: Negative for polydipsia and polyuria.   Genitourinary: Negative for difficulty urinating, hematuria and urgency.   Musculoskeletal: Negative for arthralgias, joint swelling and neck pain.   Neurological: Positive for headaches. Negative for weakness.   Psychiatric/Behavioral: Negative for confusion and dysphoric mood.       Objective:      /72   Pulse 81   Ht 5' 10" (1.778 m)   Wt 94.3 kg (208 lb)   SpO2 95%   BMI 29.84 kg/m²     Physical Exam  Vitals signs reviewed.   Constitutional:       Appearance: He is well-developed.   HENT:      Head: Normocephalic and atraumatic.      Right Ear: External ear normal.      Left Ear: External ear normal.      Nose: Nose normal.      Mouth/Throat:      Pharynx: No oropharyngeal exudate.   Eyes:      General: Lids are normal. No scleral icterus.     Extraocular Movements: Extraocular movements intact.      Right eye: Normal extraocular motion.      Left eye: Normal extraocular motion.      Conjunctiva/sclera: Conjunctivae normal.   Neck:      Musculoskeletal: Neck supple.      Thyroid: No thyromegaly.      Vascular: No carotid bruit or JVD.   Cardiovascular:      Rate and Rhythm: Normal rate and regular rhythm.      Pulses: Normal pulses.      Heart sounds: Normal heart sounds. No murmur. No friction rub. No gallop.    Pulmonary:      Effort: Pulmonary effort is normal.      Breath sounds: Normal breath sounds. No wheezing, rhonchi or rales.   Abdominal:      General: Bowel sounds are normal. There is no distension.      Palpations: Abdomen is soft. There is no hepatomegaly, splenomegaly or mass.      Tenderness: There is no abdominal tenderness.   Musculoskeletal: Normal range of motion.         General: No tenderness.   Lymphadenopathy:      Cervical: " "No cervical adenopathy.   Skin:     General: Skin is warm and dry.   Neurological:      Mental Status: He is alert and oriented to person, place, and time.      Cranial Nerves: No cranial nerve deficit.      Sensory: No sensory deficit.      Coordination: Coordination normal.      Deep Tendon Reflexes: Reflexes are normal and symmetric.   Psychiatric:         Behavior: Behavior is cooperative.         Assessment:       1. Amaurosis fugax of left eye    2. Pre-diabetes    3. Hypercholesterolemia    4. Coronary artery disease involving native coronary artery of native heart without angina pectoris    5. Status post thoracic aortic aneurysm repair          Plan:       Discussed HIV screening.  Offered influenza (declines) and shingles vaccines.    Will obtain ESR/CRP to rule out GCA.  Consider 2d echo/24 Holter/MRI brain.    Orders Placed This Encounter    Sedimentation rate    C-Reactive Protein    Lipid Panel    Comprehensive Metabolic Panel    Hemoglobin A1C    Microalbumin/Creatinine Ratio, Urine     Further recommendations to follow after above.            "This note will not be shared with the patient."    "

## 2020-12-10 ENCOUNTER — PATIENT MESSAGE (OUTPATIENT)
Dept: FAMILY MEDICINE | Facility: CLINIC | Age: 60
End: 2020-12-10

## 2020-12-10 ENCOUNTER — TELEPHONE (OUTPATIENT)
Dept: CARDIOLOGY | Facility: CLINIC | Age: 60
End: 2020-12-10

## 2020-12-10 LAB
ALBUMIN SERPL BCP-MCNC: 4 G/DL (ref 3.5–5.2)
ALP SERPL-CCNC: 74 U/L (ref 55–135)
ALT SERPL W/O P-5'-P-CCNC: 23 U/L (ref 10–44)
ANION GAP SERPL CALC-SCNC: 11 MMOL/L (ref 8–16)
AST SERPL-CCNC: 23 U/L (ref 10–40)
BILIRUB SERPL-MCNC: 0.3 MG/DL (ref 0.1–1)
BUN SERPL-MCNC: 21 MG/DL (ref 6–20)
CALCIUM SERPL-MCNC: 9 MG/DL (ref 8.7–10.5)
CHLORIDE SERPL-SCNC: 102 MMOL/L (ref 95–110)
CHOLEST SERPL-MCNC: 292 MG/DL (ref 120–199)
CHOLEST/HDLC SERPL: 6.3 {RATIO} (ref 2–5)
CO2 SERPL-SCNC: 27 MMOL/L (ref 23–29)
CREAT SERPL-MCNC: 1.2 MG/DL (ref 0.5–1.4)
CRP SERPL-MCNC: 2.4 MG/L (ref 0–8.2)
EST. GFR  (AFRICAN AMERICAN): >60 ML/MIN/1.73 M^2
EST. GFR  (NON AFRICAN AMERICAN): >60 ML/MIN/1.73 M^2
ESTIMATED AVG GLUCOSE: 114 MG/DL (ref 68–131)
GLUCOSE SERPL-MCNC: 104 MG/DL (ref 70–110)
HBA1C MFR BLD HPLC: 5.6 % (ref 4–5.6)
HDLC SERPL-MCNC: 46 MG/DL (ref 40–75)
HDLC SERPL: 15.8 % (ref 20–50)
LDLC SERPL CALC-MCNC: 176.4 MG/DL (ref 63–159)
NONHDLC SERPL-MCNC: 246 MG/DL
POTASSIUM SERPL-SCNC: 4.3 MMOL/L (ref 3.5–5.1)
PROT SERPL-MCNC: 7.4 G/DL (ref 6–8.4)
SODIUM SERPL-SCNC: 140 MMOL/L (ref 136–145)
TRIGL SERPL-MCNC: 348 MG/DL (ref 30–150)

## 2020-12-10 NOTE — TELEPHONE ENCOUNTER
----- Message from Keenan Thompson MD sent at 12/9/2020 10:44 PM CST -----  Can you please contact Mr. Gee to schedule him for the CTA that I ordered just now?    I also called in Crestor for him.  He appeared to have been resistant about this class of medicine in the past, but now he's had a stroke equivalent and absolutely needs to be on it.    Thank you,  Keenan

## 2020-12-14 ENCOUNTER — PATIENT MESSAGE (OUTPATIENT)
Dept: CARDIOLOGY | Facility: CLINIC | Age: 60
End: 2020-12-14

## 2020-12-14 ENCOUNTER — PATIENT MESSAGE (OUTPATIENT)
Dept: FAMILY MEDICINE | Facility: CLINIC | Age: 60
End: 2020-12-14

## 2020-12-14 ENCOUNTER — HOSPITAL ENCOUNTER (OUTPATIENT)
Dept: RADIOLOGY | Facility: HOSPITAL | Age: 60
Discharge: HOME | End: 2020-12-14
Attending: INTERNAL MEDICINE
Payer: COMMERCIAL

## 2020-12-14 DIAGNOSIS — G45.3 AMAUROSIS FUGAX: Primary | ICD-10-CM

## 2020-12-14 DIAGNOSIS — G45.3 AMAUROSIS FUGAX: ICD-10-CM

## 2020-12-14 DIAGNOSIS — I63.89 OTHER CEREBRAL INFARCTION: ICD-10-CM

## 2020-12-14 PROCEDURE — 70498 CT ANGIOGRAPHY NECK: CPT | Mod: 26,,, | Performed by: RADIOLOGY

## 2020-12-14 PROCEDURE — 70496 CTA HEAD AND NECK (XPD): ICD-10-PCS | Mod: 26,,, | Performed by: RADIOLOGY

## 2020-12-14 PROCEDURE — 70498 CTA HEAD AND NECK (XPD): ICD-10-PCS | Mod: 26,,, | Performed by: RADIOLOGY

## 2020-12-14 PROCEDURE — 25500020 PHARM REV CODE 255: Performed by: INTERNAL MEDICINE

## 2020-12-14 PROCEDURE — 70496 CT ANGIOGRAPHY HEAD: CPT | Mod: TC

## 2020-12-14 PROCEDURE — 70496 CT ANGIOGRAPHY HEAD: CPT | Mod: 26,,, | Performed by: RADIOLOGY

## 2020-12-14 PROCEDURE — 70498 CT ANGIOGRAPHY NECK: CPT | Mod: TC

## 2020-12-14 RX ADMIN — IOHEXOL 75 ML: 350 INJECTION, SOLUTION INTRAVENOUS at 07:12

## 2020-12-15 ENCOUNTER — PATIENT MESSAGE (OUTPATIENT)
Dept: FAMILY MEDICINE | Facility: CLINIC | Age: 60
End: 2020-12-15

## 2020-12-15 DIAGNOSIS — I63.89 OTHER CEREBRAL INFARCTION: ICD-10-CM

## 2020-12-15 DIAGNOSIS — E04.2 MULTIPLE THYROID NODULES: Primary | ICD-10-CM

## 2020-12-16 ENCOUNTER — OFFICE VISIT (OUTPATIENT)
Dept: CARDIOLOGY | Facility: CLINIC | Age: 60
End: 2020-12-16
Payer: COMMERCIAL

## 2020-12-16 ENCOUNTER — PATIENT MESSAGE (OUTPATIENT)
Dept: CARDIOLOGY | Facility: CLINIC | Age: 60
End: 2020-12-16

## 2020-12-16 VITALS
SYSTOLIC BLOOD PRESSURE: 119 MMHG | DIASTOLIC BLOOD PRESSURE: 70 MMHG | HEIGHT: 70 IN | WEIGHT: 213.38 LBS | HEART RATE: 72 BPM | BODY MASS INDEX: 30.55 KG/M2

## 2020-12-16 DIAGNOSIS — G45.3 AMAUROSIS FUGAX: ICD-10-CM

## 2020-12-16 DIAGNOSIS — H34.9 EMBOLISM INVOLVING RETINAL ARTERY: Primary | ICD-10-CM

## 2020-12-16 DIAGNOSIS — R73.03 PRE-DIABETES: ICD-10-CM

## 2020-12-16 DIAGNOSIS — I25.10 CORONARY ARTERY DISEASE INVOLVING NATIVE CORONARY ARTERY OF NATIVE HEART WITHOUT ANGINA PECTORIS: ICD-10-CM

## 2020-12-16 DIAGNOSIS — Z98.890 STATUS POST THORACIC AORTIC ANEURYSM REPAIR: ICD-10-CM

## 2020-12-16 DIAGNOSIS — E78.5 DYSLIPIDEMIA: ICD-10-CM

## 2020-12-16 DIAGNOSIS — Q23.1 BICUSPID AORTIC VALVE: ICD-10-CM

## 2020-12-16 DIAGNOSIS — Z86.79 STATUS POST THORACIC AORTIC ANEURYSM REPAIR: ICD-10-CM

## 2020-12-16 PROCEDURE — 99214 OFFICE O/P EST MOD 30 MIN: CPT | Mod: S$GLB,,, | Performed by: INTERNAL MEDICINE

## 2020-12-16 PROCEDURE — 3008F BODY MASS INDEX DOCD: CPT | Mod: CPTII,S$GLB,, | Performed by: INTERNAL MEDICINE

## 2020-12-16 PROCEDURE — 99999 PR PBB SHADOW E&M-EST. PATIENT-LVL IV: CPT | Mod: PBBFAC,,, | Performed by: INTERNAL MEDICINE

## 2020-12-16 PROCEDURE — 1126F AMNT PAIN NOTED NONE PRSNT: CPT | Mod: S$GLB,,, | Performed by: INTERNAL MEDICINE

## 2020-12-16 PROCEDURE — 3008F PR BODY MASS INDEX (BMI) DOCUMENTED: ICD-10-PCS | Mod: CPTII,S$GLB,, | Performed by: INTERNAL MEDICINE

## 2020-12-16 PROCEDURE — 1126F PR PAIN SEVERITY QUANTIFIED, NO PAIN PRESENT: ICD-10-PCS | Mod: S$GLB,,, | Performed by: INTERNAL MEDICINE

## 2020-12-16 PROCEDURE — 99214 PR OFFICE/OUTPT VISIT, EST, LEVL IV, 30-39 MIN: ICD-10-PCS | Mod: S$GLB,,, | Performed by: INTERNAL MEDICINE

## 2020-12-16 PROCEDURE — 99999 PR PBB SHADOW E&M-EST. PATIENT-LVL IV: ICD-10-PCS | Mod: PBBFAC,,, | Performed by: INTERNAL MEDICINE

## 2020-12-16 RX ORDER — EPINEPHRINE 0.22MG
100 AEROSOL WITH ADAPTER (ML) INHALATION DAILY
COMMUNITY

## 2020-12-16 NOTE — PROGRESS NOTES
Subjective:   Patient ID:  Guillermo Gee is a 59 y.o. male who presents for evaluation of No chief complaint on file.      HPI: Very pleasant man with a history of a bicuspid aortic valve with an ascending aortic aneurysm repaired in January of this year.  He recently had an episode characterized by loss of vision in his left eye for about 5 minutes, and subsequent retinal scanning showed infarction.    He occasionally smokes cigarettes.    Angiography prior to his aorta repair showed mild to moderate nonobstructive atherosclerotic disease.    Past Medical History:   Diagnosis Date    CAD (coronary artery disease) 12/19/2019    Diverticulosis     Family history of prostate cancer in father     Kidney stones        Past Surgical History:   Procedure Laterality Date    INGUINAL HERNIA REPAIR Left     LEFT HEART CATHETERIZATION Left 12/19/2019    Procedure: CATHETERIZATION, HEART, LEFT;  Surgeon: John Kyle MD;  Location: [facility] CATH LAB;  Service: Cardiology;  Laterality: Left;    REPLACEMENT OF ASCENDING AORTA N/A 1/6/2020    Procedure: REPLACEMENT, AORTA, ASCENDING;  Surgeon: Allen Baez MD;  Location: [facility] OR [address] [place] [address];  Service: Cardiothoracic;  Laterality: N/A;    SHOULDER ARTHROSCOPY W/ ROTATOR CUFF REPAIR Bilateral 2011, 2012    UMBILICAL HERNIA REPAIR         Social History     Tobacco Use    Smoking status: Current Some Day Smoker     Types: Cigarettes     Start date: 12/27/1981    Smokeless tobacco: Never Used    Tobacco comment: occ cig use: ~ 3/day   Substance Use Topics    Alcohol use: Yes     Alcohol/week: 6.0 - 8.0 standard drinks     Types: 3 - 4 Glasses of wine, 3 - 4 Standard drinks or equivalent per week     Frequency: 2-3 times a week     Drinks per session: 1 or 2     Binge frequency: Never    Drug use: Never       Family History   Problem Relation Age of Onset    No Known Problems Mother     Prostate cancer Father     No Known Problems Sister     No Known Problems  Brother     Colon cancer Paternal Uncle     No Known Problems Brother        Current Outpatient Medications   Medication Sig    ascorbic acid, vitamin C, (VITAMIN C) 500 MG tablet     aspirin 325 MG tablet Take 1 tablet (325 mg total) by mouth once daily.    b complex vitamins tablet Take 1 tablet by mouth once daily.    betamethasone dipropionate (DIPROLENE) 0.05 % cream     biotin/calcium carbonate (BIOTIN 100+10 ORAL) Take by mouth.    ciclopirox (LOPROX) 0.77 % Susp     omega-3 fatty acids/fish oil (FISH OIL-OMEGA-3 FATTY ACIDS) 300-1,000 mg capsule Take 2 capsules by mouth once daily.    rosuvastatin (CRESTOR) 20 MG tablet Take 1 tablet (20 mg total) by mouth once daily.    turmeric 400 mg Cap     zinc sulfate (ZINC-220) 220 (50) mg capsule      No current facility-administered medications for this visit.        Review of patient's allergies indicates:   Allergen Reactions    Codeine Itching       Review of Systems   Constitution: Negative.   HENT: Negative.    Eyes: Negative.    Cardiovascular: Negative.  Negative for chest pain, dyspnea on exertion, near-syncope, orthopnea and palpitations.   Respiratory: Negative.  Negative for cough and shortness of breath.    Endocrine: Negative.    Hematologic/Lymphatic: Negative.    Skin: Negative.    Musculoskeletal: Negative.    Gastrointestinal: Negative.    Genitourinary: Negative.    Neurological: Negative.    Psychiatric/Behavioral: Negative.      Objective:   Physical Exam   Constitutional: He is oriented to person, place, and time. He appears well-developed and well-nourished.   HENT:   Head: Normocephalic and atraumatic.   Mouth/Throat: Oropharynx is clear and moist.   Eyes: Conjunctivae and EOM are normal. No scleral icterus.   Neck: Normal range of motion. Neck supple. No JVD present.   Cardiovascular: Normal rate, regular rhythm, normal heart sounds and intact distal pulses. Exam reveals no gallop and no friction rub.   No murmur  heard.  Pulmonary/Chest: Effort normal and breath sounds normal. He has no wheezes. He has no rales.   Abdominal: Soft. Bowel sounds are normal. He exhibits no distension. There is no abdominal tenderness.   Musculoskeletal: Normal range of motion.         General: No edema.   Neurological: He is alert and oriented to person, place, and time.   Skin: Skin is warm and dry. No rash noted. No erythema.   Psychiatric: He has a normal mood and affect. His behavior is normal. Judgment and thought content normal.   Vitals reviewed.      Lab Results   Component Value Date    WBC 5.27 02/10/2020    HGB 14.3 02/10/2020    HCT 46.1 02/10/2020    MCV 94 02/10/2020     02/10/2020         Chemistry        Component Value Date/Time     12/09/2020 0830    K 4.3 12/09/2020 0830     12/09/2020 0830    CO2 27 12/09/2020 0830    BUN 21 (H) 12/09/2020 0830    CREATININE 1.2 12/09/2020 0830     12/09/2020 0830        Component Value Date/Time    CALCIUM 9.0 12/09/2020 0830    ALKPHOS 74 12/09/2020 0830    AST 23 12/09/2020 0830    ALT 23 12/09/2020 0830    BILITOT 0.3 12/09/2020 0830    ESTGFRAFRICA >60.0 12/09/2020 0830    EGFRNONAA >60.0 12/09/2020 0830            Lab Results   Component Value Date    CHOL 292 (H) 12/09/2020    CHOL 241 (H) 02/10/2020     Lab Results   Component Value Date    HDL 46 12/09/2020    HDL 52 02/10/2020     Lab Results   Component Value Date    LDLCALC 176.4 (H) 12/09/2020    LDLCALC 169.0 (H) 02/10/2020     Lab Results   Component Value Date    TRIG 348 (H) 12/09/2020    TRIG 100 02/10/2020     Lab Results   Component Value Date    CHOLHDL 15.8 (L) 12/09/2020    CHOLHDL 21.6 02/10/2020       Lab Results   Component Value Date    TSH 2.777 10/04/2019       Lab Results   Component Value Date    HGBA1C 5.6 12/09/2020         Assessment:     1. Embolism involving retinal artery    2. Amaurosis fugax    3. Bicuspid aortic valve    4. Coronary artery disease involving native coronary  artery of native heart without angina pectoris    5. Status post thoracic aortic aneurysm repair    6. Pre-diabetes        Plan:     Crestor 20mg daily plus ASA    Event monitor to make sure he's not having bouts of AFib    Continue current medicines.    Diet/exercise goals reinforced.    Hand washing and social distancing stressed.    F/U 6 months with lipids

## 2020-12-17 ENCOUNTER — PATIENT MESSAGE (OUTPATIENT)
Dept: FAMILY MEDICINE | Facility: CLINIC | Age: 60
End: 2020-12-17

## 2020-12-18 ENCOUNTER — HOSPITAL ENCOUNTER (OUTPATIENT)
Dept: RADIOLOGY | Facility: HOSPITAL | Age: 60
Discharge: HOME | End: 2020-12-18
Attending: FAMILY MEDICINE
Payer: COMMERCIAL

## 2020-12-18 DIAGNOSIS — E04.2 MULTIPLE THYROID NODULES: ICD-10-CM

## 2020-12-18 PROCEDURE — 76536 US SOFT TISSUE HEAD NECK THYROID: ICD-10-PCS | Mod: 26,,, | Performed by: RADIOLOGY

## 2020-12-18 PROCEDURE — 76536 US EXAM OF HEAD AND NECK: CPT | Mod: 26,,, | Performed by: RADIOLOGY

## 2020-12-18 PROCEDURE — 76536 US EXAM OF HEAD AND NECK: CPT | Mod: TC

## 2020-12-22 ENCOUNTER — HOSPITAL ENCOUNTER (OUTPATIENT)
Dept: CARDIOLOGY | Facility: HOSPITAL | Age: 60
Discharge: HOME | End: 2020-12-22
Attending: INTERNAL MEDICINE
Payer: COMMERCIAL

## 2020-12-22 VITALS
HEART RATE: 70 BPM | WEIGHT: 213 LBS | SYSTOLIC BLOOD PRESSURE: 120 MMHG | HEIGHT: 70 IN | DIASTOLIC BLOOD PRESSURE: 70 MMHG | BODY MASS INDEX: 30.49 KG/M2

## 2020-12-22 DIAGNOSIS — H34.9 EMBOLISM INVOLVING RETINAL ARTERY: ICD-10-CM

## 2020-12-22 LAB
ASCENDING AORTA: 3.31 CM
AV INDEX (PROSTH): 0.67
AV MEAN GRADIENT: 5 MMHG
AV PEAK GRADIENT: 11 MMHG
AV VALVE AREA: 2.84 CM2
AV VELOCITY RATIO: 0.57
BSA FOR ECHO PROCEDURE: 2.18 M2
CV ECHO LV RWT: 0.36 CM
DOP CALC AO PEAK VEL: 1.64 M/S
DOP CALC AO VTI: 29.17 CM
DOP CALC LVOT AREA: 4.2 CM2
DOP CALC LVOT DIAMETER: 2.32 CM
DOP CALC LVOT PEAK VEL: 0.94 M/S
DOP CALC LVOT STROKE VOLUME: 82.81 CM3
DOP CALCLVOT PEAK VEL VTI: 19.6 CM
E WAVE DECELERATION TIME: 208.5 MSEC
E/A RATIO: 0.91
E/E' RATIO: 5.52 M/S
ECHO LV POSTERIOR WALL: 0.9 CM (ref 0.6–1.1)
FRACTIONAL SHORTENING: 36 % (ref 28–44)
INTERVENTRICULAR SEPTUM: 0.78 CM (ref 0.6–1.1)
IVRT: 74.22 MSEC
LA MAJOR: 5.57 CM
LA MINOR: 5.64 CM
LA WIDTH: 4.24 CM
LEFT ATRIUM SIZE: 3.64 CM
LEFT ATRIUM VOLUME INDEX MOD: 31.4 ML/M2
LEFT ATRIUM VOLUME INDEX: 34.3 ML/M2
LEFT ATRIUM VOLUME MOD: 67.25 CM3
LEFT ATRIUM VOLUME: 73.53 CM3
LEFT INTERNAL DIMENSION IN SYSTOLE: 3.2 CM (ref 2.1–4)
LEFT VENTRICLE DIASTOLIC VOLUME INDEX: 54.29 ML/M2
LEFT VENTRICLE DIASTOLIC VOLUME: 116.39 ML
LEFT VENTRICLE MASS INDEX: 67 G/M2
LEFT VENTRICLE SYSTOLIC VOLUME INDEX: 19.1 ML/M2
LEFT VENTRICLE SYSTOLIC VOLUME: 40.91 ML
LEFT VENTRICULAR INTERNAL DIMENSION IN DIASTOLE: 4.97 CM (ref 3.5–6)
LEFT VENTRICULAR MASS: 143.14 G
LV LATERAL E/E' RATIO: 4.83 M/S
LV SEPTAL E/E' RATIO: 6.44 M/S
MV A" WAVE DURATION": 20.27 MSEC
MV PEAK A VEL: 0.64 M/S
MV PEAK E VEL: 0.58 M/S
PISA TR MAX VEL: 2.46 M/S
PULM VEIN S/D RATIO: 1.11
PV PEAK D VEL: 0.57 M/S
PV PEAK S VEL: 0.63 M/S
RA MAJOR: 5.48 CM
RA PRESSURE: 3 MMHG
RA WIDTH: 4.01 CM
RIGHT ATRIAL AREA: 24 CM2
RIGHT VENTRICULAR END-DIASTOLIC DIMENSION: 3.83 CM
RV TISSUE DOPPLER FREE WALL SYSTOLIC VELOCITY 1 (APICAL 4 CHAMBER VIEW): 12.47 CM/S
SINUS: 3.89 CM
STJ: 3.24 CM
TDI LATERAL: 0.12 M/S
TDI SEPTAL: 0.09 M/S
TDI: 0.11 M/S
TR MAX PG: 24 MMHG
TRICUSPID ANNULAR PLANE SYSTOLIC EXCURSION: 1.95 CM
TV REST PULMONARY ARTERY PRESSURE: 27 MMHG

## 2020-12-22 PROCEDURE — 93306 TTE W/DOPPLER COMPLETE: CPT

## 2020-12-22 PROCEDURE — 93306 TTE W/DOPPLER COMPLETE: CPT | Mod: 26,,, | Performed by: INTERNAL MEDICINE

## 2020-12-22 PROCEDURE — 93306 ECHO (CUPID ONLY): ICD-10-PCS | Mod: 26,,, | Performed by: INTERNAL MEDICINE

## 2020-12-22 NOTE — PROGRESS NOTES
Procedure explained. 20 g sl started in right a/c for bubble study . Bubble study done x 2 with and without valsalva. Tolerated well. Sl d/satnam after. Pressure applied.  Failed attempt in right forearm x 3 pressure applied. Pt difficult stick veins blow and cant thread .

## 2020-12-23 ENCOUNTER — PATIENT MESSAGE (OUTPATIENT)
Dept: FAMILY MEDICINE | Facility: CLINIC | Age: 60
End: 2020-12-23

## 2020-12-23 DIAGNOSIS — E04.2 MULTIPLE THYROID NODULES: Primary | ICD-10-CM

## 2020-12-24 ENCOUNTER — PATIENT MESSAGE (OUTPATIENT)
Dept: CARDIOLOGY | Facility: CLINIC | Age: 60
End: 2020-12-24

## 2020-12-28 ENCOUNTER — PATIENT MESSAGE (OUTPATIENT)
Dept: CARDIOLOGY | Facility: CLINIC | Age: 60
End: 2020-12-28

## 2020-12-29 ENCOUNTER — TELEPHONE (OUTPATIENT)
Dept: CARDIOLOGY | Facility: HOSPITAL | Age: 60
End: 2020-12-29

## 2020-12-29 DIAGNOSIS — E04.1 THYROID NODULE: ICD-10-CM

## 2020-12-29 NOTE — TELEPHONE ENCOUNTER
" Attempted to contact this patient by phone with information regarding his cardiac monitor. Advised patient that a ([phone]) will contact him to confirm shipping. Monitoring company name provide to patient. "[employer]"  "

## 2020-12-30 ENCOUNTER — CLINICAL SUPPORT (OUTPATIENT)
Dept: CARDIOLOGY | Facility: HOSPITAL | Age: 60
End: 2020-12-30
Attending: INTERNAL MEDICINE
Payer: COMMERCIAL

## 2020-12-30 DIAGNOSIS — H34.9 EMBOLISM INVOLVING RETINAL ARTERY: ICD-10-CM

## 2021-01-07 ENCOUNTER — PATIENT MESSAGE (OUTPATIENT)
Dept: FAMILY MEDICINE | Facility: CLINIC | Age: 61
End: 2021-01-07

## 2021-01-08 ENCOUNTER — PATIENT MESSAGE (OUTPATIENT)
Dept: FAMILY MEDICINE | Facility: CLINIC | Age: 61
End: 2021-01-08

## 2021-01-11 ENCOUNTER — PATIENT OUTREACH (OUTPATIENT)
Dept: ADMINISTRATIVE | Facility: HOSPITAL | Age: 61
End: 2021-01-11

## 2021-01-13 ENCOUNTER — PATIENT MESSAGE (OUTPATIENT)
Dept: CARDIOLOGY | Facility: CLINIC | Age: 61
End: 2021-01-13

## 2021-01-30 ENCOUNTER — PATIENT MESSAGE (OUTPATIENT)
Dept: FAMILY MEDICINE | Facility: CLINIC | Age: 61
End: 2021-01-30

## 2021-02-01 ENCOUNTER — PATIENT MESSAGE (OUTPATIENT)
Dept: FAMILY MEDICINE | Facility: CLINIC | Age: 61
End: 2021-02-01

## 2021-02-03 ENCOUNTER — TELEPHONE (OUTPATIENT)
Dept: SURGERY | Facility: CLINIC | Age: 61
End: 2021-02-03

## 2021-02-09 ENCOUNTER — PATIENT MESSAGE (OUTPATIENT)
Dept: FAMILY MEDICINE | Facility: CLINIC | Age: 61
End: 2021-02-09

## 2021-02-11 ENCOUNTER — PATIENT MESSAGE (OUTPATIENT)
Dept: FAMILY MEDICINE | Facility: CLINIC | Age: 61
End: 2021-02-11

## 2021-02-11 DIAGNOSIS — Z00.00 LABORATORY EXAM ORDERED AS PART OF ROUTINE GENERAL MEDICAL EXAMINATION: Primary | ICD-10-CM

## 2021-02-11 DIAGNOSIS — Z02.6 ENCOUNTER FOR EXAMINATION FOR INSURANCE PURPOSES: ICD-10-CM

## 2021-03-01 ENCOUNTER — OFFICE VISIT (OUTPATIENT)
Dept: ENDOCRINOLOGY | Facility: CLINIC | Age: 61
End: 2021-03-01
Attending: FAMILY MEDICINE
Payer: COMMERCIAL

## 2021-03-01 ENCOUNTER — APPOINTMENT (OUTPATIENT)
Dept: RADIOLOGY | Facility: CLINIC | Age: 61
End: 2021-03-01
Attending: INTERNAL MEDICINE
Payer: COMMERCIAL

## 2021-03-01 VITALS
BODY MASS INDEX: 30.41 KG/M2 | HEIGHT: 70 IN | WEIGHT: 212.44 LBS | HEART RATE: 70 BPM | SYSTOLIC BLOOD PRESSURE: 114 MMHG | DIASTOLIC BLOOD PRESSURE: 72 MMHG

## 2021-03-01 DIAGNOSIS — E04.1 THYROID NODULE: ICD-10-CM

## 2021-03-01 DIAGNOSIS — E04.2 MULTIPLE THYROID NODULES: ICD-10-CM

## 2021-03-01 DIAGNOSIS — E04.1 THYROID NODULE: Primary | ICD-10-CM

## 2021-03-01 PROCEDURE — 10005 FNA BX W/US GDN 1ST LES: CPT | Mod: S$GLB,,, | Performed by: INTERNAL MEDICINE

## 2021-03-01 PROCEDURE — 1126F AMNT PAIN NOTED NONE PRSNT: CPT | Mod: S$GLB,,, | Performed by: INTERNAL MEDICINE

## 2021-03-01 PROCEDURE — 88173 CYTOPATH EVAL FNA REPORT: CPT | Mod: 26,,, | Performed by: PATHOLOGY

## 2021-03-01 PROCEDURE — 3008F BODY MASS INDEX DOCD: CPT | Mod: CPTII,S$GLB,, | Performed by: INTERNAL MEDICINE

## 2021-03-01 PROCEDURE — 99214 PR OFFICE/OUTPT VISIT, EST, LEVL IV, 30-39 MIN: ICD-10-PCS | Mod: S$GLB,,, | Performed by: INTERNAL MEDICINE

## 2021-03-01 PROCEDURE — 99214 OFFICE O/P EST MOD 30 MIN: CPT | Mod: S$GLB,,, | Performed by: INTERNAL MEDICINE

## 2021-03-01 PROCEDURE — 10005 US FINE NEEDLE ASPIRATION THYROID, FIRST LESION: ICD-10-PCS | Mod: S$GLB,,, | Performed by: INTERNAL MEDICINE

## 2021-03-01 PROCEDURE — 3008F PR BODY MASS INDEX (BMI) DOCUMENTED: ICD-10-PCS | Mod: CPTII,S$GLB,, | Performed by: INTERNAL MEDICINE

## 2021-03-01 PROCEDURE — 88173 CYTOPATH EVAL FNA REPORT: CPT | Performed by: PATHOLOGY

## 2021-03-01 PROCEDURE — 1126F PR PAIN SEVERITY QUANTIFIED, NO PAIN PRESENT: ICD-10-PCS | Mod: S$GLB,,, | Performed by: INTERNAL MEDICINE

## 2021-03-01 PROCEDURE — 88173 PR  INTERPRETATION OF FNA SMEAR: ICD-10-PCS | Mod: 26,,, | Performed by: PATHOLOGY

## 2021-03-01 RX ORDER — SELENIUM 200 MCG
TABLET ORAL
COMMUNITY
Start: 2021-01-11

## 2021-03-02 ENCOUNTER — TELEPHONE (OUTPATIENT)
Dept: ENDOCRINOLOGY | Facility: CLINIC | Age: 61
End: 2021-03-02

## 2021-03-02 DIAGNOSIS — E04.1 THYROID NODULE: Primary | ICD-10-CM

## 2021-03-02 LAB — FINAL PATHOLOGIC DIAGNOSIS: NORMAL

## 2021-03-10 ENCOUNTER — PATIENT MESSAGE (OUTPATIENT)
Dept: CARDIOLOGY | Facility: CLINIC | Age: 61
End: 2021-03-10

## 2021-03-10 RX ORDER — ROSUVASTATIN CALCIUM 20 MG/1
20 TABLET, COATED ORAL DAILY
Qty: 90 TABLET | Refills: 3 | Status: SHIPPED | OUTPATIENT
Start: 2021-03-10 | End: 2022-03-07 | Stop reason: SDUPTHER

## 2021-03-15 ENCOUNTER — PATIENT MESSAGE (OUTPATIENT)
Dept: FAMILY MEDICINE | Facility: CLINIC | Age: 61
End: 2021-03-15

## 2021-03-16 ENCOUNTER — LAB VISIT (OUTPATIENT)
Dept: LAB | Facility: HOSPITAL | Age: 61
End: 2021-03-16
Attending: INTERNAL MEDICINE
Payer: COMMERCIAL

## 2021-03-16 DIAGNOSIS — E78.5 DYSLIPIDEMIA: ICD-10-CM

## 2021-03-16 DIAGNOSIS — Z02.6 ENCOUNTER FOR EXAMINATION FOR INSURANCE PURPOSES: ICD-10-CM

## 2021-03-16 DIAGNOSIS — Z00.00 LABORATORY EXAM ORDERED AS PART OF ROUTINE GENERAL MEDICAL EXAMINATION: ICD-10-CM

## 2021-03-16 PROCEDURE — 80061 LIPID PANEL: CPT | Performed by: INTERNAL MEDICINE

## 2021-03-16 PROCEDURE — 36415 COLL VENOUS BLD VENIPUNCTURE: CPT | Mod: PO | Performed by: INTERNAL MEDICINE

## 2021-03-16 PROCEDURE — 82947 ASSAY GLUCOSE BLOOD QUANT: CPT | Performed by: FAMILY MEDICINE

## 2021-03-17 LAB
CHOLEST SERPL-MCNC: 143 MG/DL (ref 120–199)
CHOLEST/HDLC SERPL: 2.8 {RATIO} (ref 2–5)
GLUCOSE SERPL-MCNC: 73 MG/DL (ref 70–110)
HDLC SERPL-MCNC: 51 MG/DL (ref 40–75)
HDLC SERPL: 35.7 % (ref 20–50)
LDLC SERPL CALC-MCNC: 72.4 MG/DL (ref 63–159)
NONHDLC SERPL-MCNC: 92 MG/DL
TRIGL SERPL-MCNC: 98 MG/DL (ref 30–150)

## 2021-03-18 ENCOUNTER — PATIENT MESSAGE (OUTPATIENT)
Dept: FAMILY MEDICINE | Facility: CLINIC | Age: 61
End: 2021-03-18

## 2021-03-24 ENCOUNTER — PATIENT MESSAGE (OUTPATIENT)
Dept: FAMILY MEDICINE | Facility: CLINIC | Age: 61
End: 2021-03-24

## 2021-04-07 ENCOUNTER — IMMUNIZATION (OUTPATIENT)
Dept: INTERNAL MEDICINE | Facility: CLINIC | Age: 61
End: 2021-04-07
Payer: COMMERCIAL

## 2021-04-07 DIAGNOSIS — Z23 NEED FOR VACCINATION: Primary | ICD-10-CM

## 2021-04-07 PROCEDURE — 91300 COVID-19, MRNA, LNP-S, PF, 30 MCG/0.3 ML DOSE VACCINE: CPT | Mod: PBBFAC | Performed by: INTERNAL MEDICINE

## 2021-04-23 ENCOUNTER — PATIENT MESSAGE (OUTPATIENT)
Dept: CARDIOLOGY | Facility: CLINIC | Age: 61
End: 2021-04-23

## 2021-04-28 ENCOUNTER — IMMUNIZATION (OUTPATIENT)
Dept: INTERNAL MEDICINE | Facility: CLINIC | Age: 61
End: 2021-04-28
Payer: COMMERCIAL

## 2021-04-28 DIAGNOSIS — Z23 NEED FOR VACCINATION: Primary | ICD-10-CM

## 2021-04-28 PROCEDURE — 0002A COVID-19, MRNA, LNP-S, PF, 30 MCG/0.3 ML DOSE VACCINE: CPT | Mod: PBBFAC | Performed by: INTERNAL MEDICINE

## 2021-04-28 PROCEDURE — 91300 COVID-19, MRNA, LNP-S, PF, 30 MCG/0.3 ML DOSE VACCINE: CPT | Mod: PBBFAC | Performed by: INTERNAL MEDICINE

## 2021-05-04 ENCOUNTER — PATIENT MESSAGE (OUTPATIENT)
Dept: CARDIOLOGY | Facility: CLINIC | Age: 61
End: 2021-05-04

## 2021-07-15 ENCOUNTER — PATIENT MESSAGE (OUTPATIENT)
Dept: INTERNAL MEDICINE | Facility: CLINIC | Age: 61
End: 2021-07-15

## 2021-10-24 ENCOUNTER — HOSPITAL ENCOUNTER (EMERGENCY)
Facility: OTHER | Age: 61
Discharge: HOME | End: 2021-10-24
Attending: EMERGENCY MEDICINE
Payer: COMMERCIAL

## 2021-10-24 VITALS
OXYGEN SATURATION: 98 % | TEMPERATURE: 98 F | HEIGHT: 70 IN | WEIGHT: 189 LBS | HEART RATE: 69 BPM | SYSTOLIC BLOOD PRESSURE: 117 MMHG | BODY MASS INDEX: 27.06 KG/M2 | RESPIRATION RATE: 17 BRPM | DIASTOLIC BLOOD PRESSURE: 65 MMHG

## 2021-10-24 DIAGNOSIS — R73.9 BLOOD GLUCOSE ELEVATED: ICD-10-CM

## 2021-10-24 DIAGNOSIS — R53.83 FATIGUE, UNSPECIFIED TYPE: Primary | ICD-10-CM

## 2021-10-24 DIAGNOSIS — R53.83 FATIGUE: ICD-10-CM

## 2021-10-24 LAB
ALBUMIN SERPL BCP-MCNC: 3.8 G/DL (ref 3.5–5.2)
ALP SERPL-CCNC: 56 U/L (ref 55–135)
ALT SERPL W/O P-5'-P-CCNC: 27 U/L (ref 10–44)
AMPHET+METHAMPHET UR QL: NEGATIVE
ANION GAP SERPL CALC-SCNC: 19 MMOL/L (ref 8–16)
AST SERPL-CCNC: 48 U/L (ref 10–40)
B-OH-BUTYR BLD STRIP-SCNC: 0.9 MMOL/L (ref 0–0.5)
BARBITURATES UR QL SCN>200 NG/ML: NEGATIVE
BASOPHILS # BLD AUTO: 0.01 K/UL (ref 0–0.2)
BASOPHILS NFR BLD: 0.1 % (ref 0–1.9)
BENZODIAZ UR QL SCN>200 NG/ML: NEGATIVE
BILIRUB SERPL-MCNC: 0.4 MG/DL (ref 0.1–1)
BUN SERPL-MCNC: 20 MG/DL (ref 6–20)
BZE UR QL SCN: NEGATIVE
CALCIUM SERPL-MCNC: 8.7 MG/DL (ref 8.7–10.5)
CANNABINOIDS UR QL SCN: NEGATIVE
CHLORIDE SERPL-SCNC: 104 MMOL/L (ref 95–110)
CO2 SERPL-SCNC: 16 MMOL/L (ref 23–29)
CREAT SERPL-MCNC: 1.3 MG/DL (ref 0.5–1.4)
CREAT UR-MCNC: 70.5 MG/DL (ref 23–375)
CTP QC/QA: YES
DIFFERENTIAL METHOD: ABNORMAL
EOSINOPHIL # BLD AUTO: 0 K/UL (ref 0–0.5)
EOSINOPHIL NFR BLD: 0 % (ref 0–8)
ERYTHROCYTE [DISTWIDTH] IN BLOOD BY AUTOMATED COUNT: 13 % (ref 11.5–14.5)
EST. GFR  (AFRICAN AMERICAN): >60 ML/MIN/1.73 M^2
EST. GFR  (NON AFRICAN AMERICAN): 60 ML/MIN/1.73 M^2
ETHANOL UR-MCNC: 32 MG/DL
GLUCOSE SERPL-MCNC: 232 MG/DL (ref 70–110)
HCO3 UR-SCNC: 24.4 MMOL/L (ref 24–28)
HCT VFR BLD AUTO: 43.1 % (ref 40–54)
HCT VFR BLD CALC: 40 %PCV (ref 36–54)
HCT VFR BLD CALC: 41 %PCV (ref 36–54)
HGB BLD-MCNC: 14 G/DL
HGB BLD-MCNC: 14 G/DL
HGB BLD-MCNC: 14.4 G/DL (ref 14–18)
IMM GRANULOCYTES # BLD AUTO: 0.01 K/UL (ref 0–0.04)
IMM GRANULOCYTES NFR BLD AUTO: 0.1 % (ref 0–0.5)
LYMPHOCYTES # BLD AUTO: 0.8 K/UL (ref 1–4.8)
LYMPHOCYTES NFR BLD: 7.7 % (ref 18–48)
MCH RBC QN AUTO: 30.1 PG (ref 27–31)
MCHC RBC AUTO-ENTMCNC: 33.4 G/DL (ref 32–36)
MCV RBC AUTO: 90 FL (ref 82–98)
METHADONE UR QL SCN>300 NG/ML: NEGATIVE
MONOCYTES # BLD AUTO: 0.2 K/UL (ref 0.3–1)
MONOCYTES NFR BLD: 2.3 % (ref 4–15)
NEUTROPHILS # BLD AUTO: 9 K/UL (ref 1.8–7.7)
NEUTROPHILS NFR BLD: 89.8 % (ref 38–73)
NRBC BLD-RTO: 0 /100 WBC
OPIATES UR QL SCN: NEGATIVE
PCO2 BLDA: 41.4 MMHG (ref 35–45)
PCP UR QL SCN>25 NG/ML: NEGATIVE
PH SMN: 7.38 [PH] (ref 7.35–7.45)
PLATELET # BLD AUTO: 201 K/UL (ref 150–450)
PMV BLD AUTO: 9.3 FL (ref 9.2–12.9)
PO2 BLDA: 32 MMHG (ref 40–60)
POC BE: -1 MMOL/L
POC IONIZED CALCIUM: 1.1 MMOL/L (ref 1.06–1.42)
POC IONIZED CALCIUM: 1.1 MMOL/L (ref 1.06–1.42)
POC SATURATED O2: 59 % (ref 95–100)
POC TCO2: 26 MMOL/L (ref 24–29)
POTASSIUM BLD-SCNC: 4 MMOL/L (ref 3.5–5.1)
POTASSIUM BLD-SCNC: 4.2 MMOL/L (ref 3.5–5.1)
POTASSIUM SERPL-SCNC: 5.6 MMOL/L (ref 3.5–5.1)
PROT SERPL-MCNC: 7.2 G/DL (ref 6–8.4)
RBC # BLD AUTO: 4.79 M/UL (ref 4.6–6.2)
SAMPLE: ABNORMAL
SAMPLE: NORMAL
SARS-COV-2 RDRP RESP QL NAA+PROBE: NEGATIVE
SODIUM BLD-SCNC: 139 MMOL/L (ref 136–145)
SODIUM BLD-SCNC: 140 MMOL/L (ref 136–145)
SODIUM SERPL-SCNC: 139 MMOL/L (ref 136–145)
TOXICOLOGY INFORMATION: ABNORMAL
WBC # BLD AUTO: 10.07 K/UL (ref 3.9–12.7)

## 2021-10-24 PROCEDURE — 93005 ELECTROCARDIOGRAM TRACING: CPT

## 2021-10-24 PROCEDURE — U0002 COVID-19 LAB TEST NON-CDC: HCPCS | Performed by: EMERGENCY MEDICINE

## 2021-10-24 PROCEDURE — 93010 ELECTROCARDIOGRAM REPORT: CPT | Mod: ,,, | Performed by: INTERNAL MEDICINE

## 2021-10-24 PROCEDURE — 93010 EKG 12-LEAD: ICD-10-PCS | Mod: ,,, | Performed by: INTERNAL MEDICINE

## 2021-10-24 PROCEDURE — 96361 HYDRATE IV INFUSION ADD-ON: CPT

## 2021-10-24 PROCEDURE — 99284 EMERGENCY DEPT VISIT MOD MDM: CPT | Mod: 25

## 2021-10-24 PROCEDURE — 85025 COMPLETE CBC W/AUTO DIFF WBC: CPT | Performed by: EMERGENCY MEDICINE

## 2021-10-24 PROCEDURE — 82010 KETONE BODYS QUAN: CPT | Performed by: EMERGENCY MEDICINE

## 2021-10-24 PROCEDURE — 80053 COMPREHEN METABOLIC PANEL: CPT | Performed by: EMERGENCY MEDICINE

## 2021-10-24 PROCEDURE — 25000003 PHARM REV CODE 250: Performed by: EMERGENCY MEDICINE

## 2021-10-24 PROCEDURE — 80307 DRUG TEST PRSMV CHEM ANLYZR: CPT | Performed by: EMERGENCY MEDICINE

## 2021-10-24 PROCEDURE — 96360 HYDRATION IV INFUSION INIT: CPT

## 2021-10-24 RX ADMIN — SODIUM CHLORIDE 1000 ML: 0.9 INJECTION, SOLUTION INTRAVENOUS at 08:10

## 2021-10-28 LAB — POCT GLUCOSE: 228 MG/DL (ref 70–110)

## 2022-01-30 ENCOUNTER — PATIENT MESSAGE (OUTPATIENT)
Dept: CARDIOLOGY | Facility: CLINIC | Age: 62
End: 2022-01-30
Payer: COMMERCIAL

## 2022-03-04 ENCOUNTER — PATIENT MESSAGE (OUTPATIENT)
Dept: CARDIOLOGY | Facility: CLINIC | Age: 62
End: 2022-03-04
Payer: COMMERCIAL

## 2022-03-07 ENCOUNTER — TELEPHONE (OUTPATIENT)
Dept: CARDIOLOGY | Facility: CLINIC | Age: 62
End: 2022-03-07
Payer: COMMERCIAL

## 2022-03-07 ENCOUNTER — PATIENT MESSAGE (OUTPATIENT)
Dept: CARDIOLOGY | Facility: CLINIC | Age: 62
End: 2022-03-07
Payer: COMMERCIAL

## 2022-03-07 RX ORDER — ROSUVASTATIN CALCIUM 20 MG/1
20 TABLET, COATED ORAL DAILY
Qty: 90 TABLET | Refills: 3 | Status: SHIPPED | OUTPATIENT
Start: 2022-03-07 | End: 2023-04-04 | Stop reason: SDUPTHER

## 2022-03-07 NOTE — TELEPHONE ENCOUNTER
Pt called back concerning needing a f/u appt, talked to Savannah and told her to not call again to ask to schedule a f/u appt. He will wait until appt is available on the waiting list.

## 2022-03-07 NOTE — TELEPHONE ENCOUNTER
Guillermo Gee Michael E., MD 3 days ago     Dr Thompson:     Crestor 20mg/90 tablets  Could you please renew Rx [id number] @ [facility]? [phone]     Many thanks!  Guillermo Gee  [phone]

## 2022-09-09 ENCOUNTER — PATIENT MESSAGE (OUTPATIENT)
Dept: CARDIOLOGY | Facility: CLINIC | Age: 62
End: 2022-09-09
Payer: COMMERCIAL

## 2022-10-10 ENCOUNTER — PATIENT MESSAGE (OUTPATIENT)
Dept: CARDIOLOGY | Facility: CLINIC | Age: 62
End: 2022-10-10
Payer: COMMERCIAL

## 2022-10-11 ENCOUNTER — TELEPHONE (OUTPATIENT)
Dept: UROLOGY | Facility: CLINIC | Age: 62
End: 2022-10-11
Payer: COMMERCIAL

## 2022-10-11 NOTE — TELEPHONE ENCOUNTER
Spoke to the patient inform of next available will seek another provider      Val carrion  
TALI:'[id number]'

## 2022-10-17 ENCOUNTER — PATIENT MESSAGE (OUTPATIENT)
Dept: UROLOGY | Facility: CLINIC | Age: 62
End: 2022-10-17
Payer: COMMERCIAL

## 2022-10-19 NOTE — TELEPHONE ENCOUNTER
Please check with [facility] for the ultrasound report. It is not from [facility].

## 2022-10-20 ENCOUNTER — PATIENT MESSAGE (OUTPATIENT)
Dept: UROLOGY | Facility: CLINIC | Age: 62
End: 2022-10-20

## 2022-10-20 ENCOUNTER — OFFICE VISIT (OUTPATIENT)
Dept: UROLOGY | Facility: CLINIC | Age: 62
End: 2022-10-20
Attending: UROLOGY
Payer: COMMERCIAL

## 2022-10-20 VITALS — DIASTOLIC BLOOD PRESSURE: 83 MMHG | SYSTOLIC BLOOD PRESSURE: 128 MMHG | HEART RATE: 66 BPM

## 2022-10-20 DIAGNOSIS — N43.40 SPERMATOCELE: Primary | ICD-10-CM

## 2022-10-20 DIAGNOSIS — R36.1 HEMATOSPERMIA: ICD-10-CM

## 2022-10-20 PROCEDURE — 1159F MED LIST DOCD IN RCRD: CPT | Mod: CPTII,S$GLB,, | Performed by: UROLOGY

## 2022-10-20 PROCEDURE — 99203 PR OFFICE/OUTPT VISIT, NEW, LEVL III, 30-44 MIN: ICD-10-PCS | Mod: S$GLB,,, | Performed by: UROLOGY

## 2022-10-20 PROCEDURE — 3074F SYST BP LT 130 MM HG: CPT | Mod: CPTII,S$GLB,, | Performed by: UROLOGY

## 2022-10-20 PROCEDURE — 99203 OFFICE O/P NEW LOW 30 MIN: CPT | Mod: S$GLB,,, | Performed by: UROLOGY

## 2022-10-20 PROCEDURE — 3079F DIAST BP 80-89 MM HG: CPT | Mod: CPTII,S$GLB,, | Performed by: UROLOGY

## 2022-10-20 PROCEDURE — 1160F RVW MEDS BY RX/DR IN RCRD: CPT | Mod: CPTII,S$GLB,, | Performed by: UROLOGY

## 2022-10-20 PROCEDURE — 1159F PR MEDICATION LIST DOCUMENTED IN MEDICAL RECORD: ICD-10-PCS | Mod: CPTII,S$GLB,, | Performed by: UROLOGY

## 2022-10-20 PROCEDURE — 3074F PR MOST RECENT SYSTOLIC BLOOD PRESSURE < 130 MM HG: ICD-10-PCS | Mod: CPTII,S$GLB,, | Performed by: UROLOGY

## 2022-10-20 PROCEDURE — 3079F PR MOST RECENT DIASTOLIC BLOOD PRESSURE 80-89 MM HG: ICD-10-PCS | Mod: CPTII,S$GLB,, | Performed by: UROLOGY

## 2022-10-20 PROCEDURE — 1160F PR REVIEW ALL MEDS BY PRESCRIBER/CLIN PHARMACIST DOCUMENTED: ICD-10-PCS | Mod: CPTII,S$GLB,, | Performed by: UROLOGY

## 2022-10-20 RX ORDER — GUAIFENESIN/PHENYLPROPANOLAMIN
500 EXPECTORANT ORAL DAILY
COMMUNITY
Start: 2022-10-01

## 2022-10-20 NOTE — PROGRESS NOTES
Subjective:      Guillermo Gee is a 61 y.o. male who was referred by Polo Osman Jr, MD for evaluation of hematospermia.      He reports 3-4 months of hematospermia, present nearly every ejaculate. He states blood is dark brown. He has no associated pain or urinary symptoms.    He has long history of a right spermatocele and does not some recent increase in size. Overall, however, bother is low.    The following portions of the patient's history were reviewed and updated as appropriate: allergies, current medications, past family history, past medical history, past social history, past surgical history and problem list.    Review of Systems  Constitutional: no fever or chills  ENT: no nasal congestion or sore throat  Respiratory: no cough or shortness of breath  Cardiovascular: no chest pain or palpitations  Gastrointestinal: no nausea or vomiting, tolerating diet  Genitourinary: as per HPI  Hematologic/Lymphatic: no easy bruising or lymphadenopathy  Musculoskeletal: no arthralgias or myalgias  Neurological: no seizures or tremors  Behavioral/Psych: no auditory or visual hallucinations     Objective:   Vitals: /83   Pulse 66     Physical Exam   General: alert and oriented, no acute distress  Head: normocephalic, atraumatic  Neck: supple, no lymphadenopathy, normal ROM, no masses  Respiratory: Symmetric expansion, non-labored breathing  Neuro: alert and oriented x3, no gross deficits  Psych: normal judgment and insight, normal mood/affect, and non-anxious    Lab Review   Urinalysis demonstrates negative for all components  Lab Results   Component Value Date    WBC 10.07 10/24/2021    HGB 14.4 10/24/2021    HCT 40 10/24/2021    HCT 43.1 10/24/2021    MCV 90 10/24/2021     10/24/2021     Lab Results   Component Value Date    CREATININE 1.3 10/24/2021    BUN 20 10/24/2021     Lab Results   Component Value Date    PSA 0.95 10/04/2019     Imaging   Scrotal US ([facility]): 6cm right spermatocele, o/w  normal    Assessment:     1. Spermatocele    2. Hematospermia        Plan:   Reassured spermatocele is benign. Discussed excision as option for treatment, but he denies adequate bother for now.  Reassured hematospermia is very likely benign, especially given normal PSA and US. Would consider add'l workup if it persists for several more months.  FU PRN

## 2022-10-21 ENCOUNTER — PATIENT MESSAGE (OUTPATIENT)
Dept: UROLOGY | Facility: CLINIC | Age: 62
End: 2022-10-21
Payer: COMMERCIAL

## 2022-11-11 ENCOUNTER — PATIENT MESSAGE (OUTPATIENT)
Dept: UROLOGY | Facility: CLINIC | Age: 62
End: 2022-11-11
Payer: COMMERCIAL

## 2022-11-22 ENCOUNTER — PATIENT MESSAGE (OUTPATIENT)
Dept: UROLOGY | Facility: CLINIC | Age: 62
End: 2022-11-22
Payer: COMMERCIAL

## 2023-02-07 ENCOUNTER — PATIENT MESSAGE (OUTPATIENT)
Dept: UROLOGY | Facility: CLINIC | Age: 63
End: 2023-02-07
Payer: COMMERCIAL

## 2023-02-20 ENCOUNTER — PATIENT MESSAGE (OUTPATIENT)
Dept: CARDIOLOGY | Facility: CLINIC | Age: 63
End: 2023-02-20
Payer: COMMERCIAL

## 2023-02-28 ENCOUNTER — OFFICE VISIT (OUTPATIENT)
Dept: CARDIOLOGY | Facility: CLINIC | Age: 63
End: 2023-02-28
Payer: COMMERCIAL

## 2023-02-28 VITALS
OXYGEN SATURATION: 95 % | HEIGHT: 70 IN | WEIGHT: 210.56 LBS | HEART RATE: 62 BPM | SYSTOLIC BLOOD PRESSURE: 126 MMHG | BODY MASS INDEX: 30.14 KG/M2 | DIASTOLIC BLOOD PRESSURE: 72 MMHG

## 2023-02-28 DIAGNOSIS — Z86.79 STATUS POST THORACIC AORTIC ANEURYSM REPAIR: ICD-10-CM

## 2023-02-28 DIAGNOSIS — E78.00 HYPERCHOLESTEROLEMIA: ICD-10-CM

## 2023-02-28 DIAGNOSIS — I25.10 CORONARY ARTERY DISEASE INVOLVING NATIVE CORONARY ARTERY OF NATIVE HEART WITHOUT ANGINA PECTORIS: Primary | ICD-10-CM

## 2023-02-28 DIAGNOSIS — H34.9 EMBOLISM INVOLVING RETINAL ARTERY: ICD-10-CM

## 2023-02-28 DIAGNOSIS — Z91.89 FRAMINGHAM CARDIAC RISK 10-20% IN NEXT 10 YEARS: ICD-10-CM

## 2023-02-28 DIAGNOSIS — Z98.890 STATUS POST THORACIC AORTIC ANEURYSM REPAIR: ICD-10-CM

## 2023-02-28 DIAGNOSIS — Q23.1 BICUSPID AORTIC VALVE: ICD-10-CM

## 2023-02-28 PROCEDURE — 99214 PR OFFICE/OUTPT VISIT, EST, LEVL IV, 30-39 MIN: ICD-10-PCS | Mod: S$GLB,,, | Performed by: INTERNAL MEDICINE

## 2023-02-28 PROCEDURE — 3078F PR MOST RECENT DIASTOLIC BLOOD PRESSURE < 80 MM HG: ICD-10-PCS | Mod: CPTII,S$GLB,, | Performed by: INTERNAL MEDICINE

## 2023-02-28 PROCEDURE — 99999 PR PBB SHADOW E&M-EST. PATIENT-LVL V: CPT | Mod: PBBFAC,,, | Performed by: INTERNAL MEDICINE

## 2023-02-28 PROCEDURE — 1159F PR MEDICATION LIST DOCUMENTED IN MEDICAL RECORD: ICD-10-PCS | Mod: CPTII,S$GLB,, | Performed by: INTERNAL MEDICINE

## 2023-02-28 PROCEDURE — 3074F SYST BP LT 130 MM HG: CPT | Mod: CPTII,S$GLB,, | Performed by: INTERNAL MEDICINE

## 2023-02-28 PROCEDURE — 1160F PR REVIEW ALL MEDS BY PRESCRIBER/CLIN PHARMACIST DOCUMENTED: ICD-10-PCS | Mod: CPTII,S$GLB,, | Performed by: INTERNAL MEDICINE

## 2023-02-28 PROCEDURE — 3074F PR MOST RECENT SYSTOLIC BLOOD PRESSURE < 130 MM HG: ICD-10-PCS | Mod: CPTII,S$GLB,, | Performed by: INTERNAL MEDICINE

## 2023-02-28 PROCEDURE — 99214 OFFICE O/P EST MOD 30 MIN: CPT | Mod: S$GLB,,, | Performed by: INTERNAL MEDICINE

## 2023-02-28 PROCEDURE — 1160F RVW MEDS BY RX/DR IN RCRD: CPT | Mod: CPTII,S$GLB,, | Performed by: INTERNAL MEDICINE

## 2023-02-28 PROCEDURE — 3008F BODY MASS INDEX DOCD: CPT | Mod: CPTII,S$GLB,, | Performed by: INTERNAL MEDICINE

## 2023-02-28 PROCEDURE — 99999 PR PBB SHADOW E&M-EST. PATIENT-LVL V: ICD-10-PCS | Mod: PBBFAC,,, | Performed by: INTERNAL MEDICINE

## 2023-02-28 PROCEDURE — 3078F DIAST BP <80 MM HG: CPT | Mod: CPTII,S$GLB,, | Performed by: INTERNAL MEDICINE

## 2023-02-28 PROCEDURE — 1159F MED LIST DOCD IN RCRD: CPT | Mod: CPTII,S$GLB,, | Performed by: INTERNAL MEDICINE

## 2023-02-28 PROCEDURE — 3008F PR BODY MASS INDEX (BMI) DOCUMENTED: ICD-10-PCS | Mod: CPTII,S$GLB,, | Performed by: INTERNAL MEDICINE

## 2023-02-28 NOTE — PROGRESS NOTES
"Subjective:   Patient ID:  Guillermo Gee is a 62 y.o. male who presents for follow up of Follow-up      HPI: Routine f/u.  He's having consistent nighttime muscle cramping in his legs that he never had before he started Crestor.    He is doing well with no new symptoms or cardiovascular complaints and no change in exercise capacity.  He denies chest discomfort, MONTEIRO, palpitations, PND/orthopnea, lightheadedness and syncope.    He's walking 3 miles per day almost every day.    My 2020 HPI: Very pleasant man with a history of a bicuspid aortic valve with an ascending aortic aneurysm repaired in January of this year.  He recently had an episode characterized by loss of vision in his left eye for about 5 minutes, and subsequent retinal scanning showed infarction.     He occasionally smokes cigarettes.     Angiography prior to his aorta repair showed mild to moderate nonobstructive atherosclerotic disease.    Patient Active Problem List   Diagnosis    Diverticulosis    Family history of prostate cancer in father    Kidney stones    Hypercholesterolemia    [place] cardiac risk 10-20% in next 10 years    Status post thoracic aortic aneurysm repair    Bicuspid aortic valve    CAD (coronary artery disease)    Pre-diabetes    Embolism involving retinal artery    Amaurosis fugax    Thyroid nodule       Current Outpatient Medications   Medication Sig    ascorbic acid, vitamin C, (VITAMIN C) 500 MG tablet Take 500 mg by mouth once daily.     b complex vitamins tablet Take 1 tablet by mouth once daily.    BABY ASPIRIN ORAL Take 162 mg by mouth once daily. "Take two baby aspirins daily"    coenzyme Q10 100 mg capsule Take 100 mg by mouth once daily.    omega-3 fatty acids/fish oil (FISH OIL-OMEGA-3 FATTY ACIDS) 300-1,000 mg capsule Take 2 capsules by mouth 2 (two) times a day.     rosuvastatin (CRESTOR) 20 MG tablet Take 1 tablet (20 mg total) by mouth once daily. Patient needs to schedule an appointment in the consult " cardiology clinic.    saw palmetto 500 MG capsule Take 500 mg by mouth once daily.    selenium 200 mcg Tab 100mcg    turmeric 400 mg Cap Take 1 capsule by mouth once daily. 1500mg    vitamin E 100 UNIT capsule Take 100 Units by mouth once daily. 180mg daily    zinc sulfate (ZINCATE) 50 mg zinc (220 mg) capsule Take 220 mg by mouth once daily.     betamethasone dipropionate (DIPROLENE) 0.05 % cream Apply topically as needed.     biotin/calcium carbonate (BIOTIN 100+10 ORAL) Take 1 tablet by mouth once daily.      No current facility-administered medications for this visit.       ROS  Objective:   Physical Exam    Lab Results   Component Value Date    WBC 10.07 10/24/2021    HGB 14.4 10/24/2021    HCT 40 10/24/2021    MCV 90 10/24/2021     10/24/2021         Chemistry        Component Value Date/Time     10/24/2021 2046    K 5.6 (H) 10/24/2021 2046     10/24/2021 2046    CO2 16 (L) 10/24/2021 2046    BUN 20 10/24/2021 2046    CREATININE 1.3 10/24/2021 2046     (H) 10/24/2021 2046        Component Value Date/Time    CALCIUM 8.7 10/24/2021 2046    ALKPHOS 56 10/24/2021 2046    AST 48 (H) 10/24/2021 2046    ALT 27 10/24/2021 2046    BILITOT 0.4 10/24/2021 2046    ESTGFRAFRICA >60 10/24/2021 2046    EGFRNONAA 60 10/24/2021 2046            Lab Results   Component Value Date    CHOL 143 03/16/2021    CHOL 292 (H) 12/09/2020    CHOL 241 (H) 02/10/2020     Lab Results   Component Value Date    HDL 51 03/16/2021    HDL 46 12/09/2020    HDL 52 02/10/2020     Lab Results   Component Value Date    LDLCALC 72.4 03/16/2021    LDLCALC 176.4 (H) 12/09/2020    LDLCALC 169.0 (H) 02/10/2020     Lab Results   Component Value Date    TRIG 98 03/16/2021    TRIG 348 (H) 12/09/2020    TRIG 100 02/10/2020     Lab Results   Component Value Date    CHOLHDL 35.7 03/16/2021    CHOLHDL 15.8 (L) 12/09/2020    CHOLHDL 21.6 02/10/2020       Lab Results   Component Value Date    TSH 2.777 10/04/2019       Lab Results    Component Value Date    HGBA1C 5.6 12/09/2020     Recent labs with Eugene: LDL 77, K 5.2  Assessment:     1. Coronary artery disease involving native coronary artery of native heart without angina pectoris    2. Hypercholesterolemia    3. Bicuspid aortic valve    4. Embolism involving retinal artery    5. Status post thoracic aortic aneurysm repair    6. [place] cardiac risk 10-20% in next 10 years        Plan:     We had a lengthy discussion about the pros and cons of dialing back Crestor.  We're going to trial 10mg for a while to see if it makes a difference.  I've also given him license to trial holding Crestor if needed and if it makes a difference, we can consider a PCSK-9 inhibitor.    Continue current medicines.    Diet/exercise goals reinforced.    Hand washing and social distancing stressed.    F/U 12 months

## 2023-03-04 ENCOUNTER — PATIENT MESSAGE (OUTPATIENT)
Dept: CARDIOLOGY | Facility: CLINIC | Age: 63
End: 2023-03-04
Payer: COMMERCIAL

## 2023-03-13 ENCOUNTER — PATIENT MESSAGE (OUTPATIENT)
Dept: UROLOGY | Facility: CLINIC | Age: 63
End: 2023-03-13
Payer: COMMERCIAL

## 2023-03-15 ENCOUNTER — PATIENT MESSAGE (OUTPATIENT)
Dept: UROLOGY | Facility: CLINIC | Age: 63
End: 2023-03-15
Payer: COMMERCIAL

## 2023-04-05 ENCOUNTER — PATIENT MESSAGE (OUTPATIENT)
Dept: CARDIOLOGY | Facility: CLINIC | Age: 63
End: 2023-04-05
Payer: COMMERCIAL

## 2023-04-05 RX ORDER — ROSUVASTATIN CALCIUM 20 MG/1
20 TABLET, COATED ORAL DAILY
Qty: 90 TABLET | Refills: 3 | OUTPATIENT
Start: 2023-04-05 | End: 2024-04-04

## 2023-09-22 ENCOUNTER — TELEPHONE (OUTPATIENT)
Dept: UROLOGY | Facility: CLINIC | Age: 63
End: 2023-09-22
Payer: COMMERCIAL

## 2023-09-25 ENCOUNTER — TELEPHONE (OUTPATIENT)
Dept: UROLOGY | Facility: CLINIC | Age: 63
End: 2023-09-25
Payer: COMMERCIAL

## 2023-09-26 ENCOUNTER — TELEPHONE (OUTPATIENT)
Dept: UROLOGY | Facility: CLINIC | Age: 63
End: 2023-09-26
Payer: COMMERCIAL

## 2023-10-11 ENCOUNTER — PATIENT MESSAGE (OUTPATIENT)
Dept: CARDIOLOGY | Facility: CLINIC | Age: 63
End: 2023-10-11
Payer: COMMERCIAL

## 2023-10-11 RX ORDER — ROSUVASTATIN CALCIUM 20 MG/1
20 TABLET, COATED ORAL DAILY
Qty: 90 TABLET | Refills: 3 | Status: SHIPPED | OUTPATIENT
Start: 2023-10-11 | End: 2024-10-10

## 2023-10-11 RX ORDER — ROSUVASTATIN CALCIUM 20 MG/1
20 TABLET, COATED ORAL DAILY
Qty: 90 TABLET | Refills: 3 | OUTPATIENT
Start: 2023-10-11 | End: 2024-10-10

## 2023-10-11 NOTE — TELEPHONE ENCOUNTER
"Guillermo Hussein Staff (supporting Keenan Thompson MD) 1 hour ago (9:16 AM)       Dr. Thompson:     This is just a simple request to get my Rosuvastatin Rx renewal requested from a different Pharmacy since [facility] is closing their Pharmacy operations.         rosuvastatin (CRESTOR) 20 MG tablet   90 pills, 3 mos supply     Preferred pharmacy: [facility] - [place], [place] [facility]# [id number]" or faxed to [phone].     Can you have your staff contact [facility] for me?     Sincerely,  Guillermo Gee"

## 2023-10-23 NOTE — PROGRESS NOTES
[facility]   [id number]  [place]  [address]      PATIENT NAME: Niall Hathaway  : 2004  DATE: 10/23/23  MRN: [id number]      Billing Provider: [facility]  Level of Service: ID OFFICE/OUTPT VISIT, EST, LEVL II, 10-19 MIN  Patient PCP Information       Provider PCP Type    Preet Carroll MD General            Reason for Visit / Chief Complaint: Toe Injury (Reports he was playing soccer Saturday and when he went to kick the ball he kicked the ground instead. He has a swollen and dark red spot on his inner side of his foot by his great toe on the left foot. )         History of Present Illness / Problem Focused Workflow     Niall Hathaway presents to the clinic with Toe Injury (Reports he was playing soccer Saturday and when he went to kick the ball he kicked the ground instead. He has a swollen and dark red spot on his inner side of his foot by his great toe on the left foot. )     20 y/o male presents to clinic with complaints of left foot and left toe pain. States he was playing soccer Saturday with friends and kicked the ground instead of the soccer ball. He states he has rested it and iced it since.     Toe Injury  Associated symptoms include joint swelling. Pertinent negatives include no abdominal pain, chest pain, chills, coughing, fever, headaches or weakness.       Review of Systems     @Review of Systems   Constitutional: Negative.  Negative for chills and fever.   HENT: Negative.     Respiratory: Negative.  Negative for cough, chest tightness and shortness of breath.    Cardiovascular:  Negative for chest pain and palpitations.   Gastrointestinal:  Negative for abdominal pain.   Musculoskeletal:  Positive for joint swelling and leg pain (left foot pain/toe pain).   Integumentary:  Negative.   Allergic/Immunologic: Negative.    Neurological: Negative.  Negative for dizziness, weakness, light-headedness and headaches.  ruthieh     Psychiatric/Behavioral: Negative.  Negative for suicidal ideas.        Medical / Social / Family History     Past Medical History:   Diagnosis Date    Fracture dislocation of digit of hand 05/2021    R pinky finger/Dr. Vu        Past Surgical History:   Procedure Laterality Date    CLOSED REDUCTION OF FRACTURE OF PHALANX OF FINGER Right 5/14/2021    Procedure: CLOSED REDUCTION, FRACTURE, FINGER, PROXIMAL PHALANX;  Surgeon: Edmundo Vu III, MD;  Location: [facility];  Service: Orthopedics;  Laterality: Right;       Social History    reports that he has never smoked. He has never been exposed to tobacco smoke. He has never used smokeless tobacco. He reports that he does not drink alcohol and does not use drugs.    Family History  's family history includes Diabetes in his maternal grandmother; Hypertension in his father; No Known Problems in his mother.    Medications and Allergies     Medications  No outpatient medications have been marked as taking for the 10/23/23 encounter (Office Visit) with Romario Mattson FNP.       Allergies  Review of patient's allergies indicates:  No Known Allergies    Physical Examination     Vitals:    10/23/23 0821   BP: 132/88   Pulse: 60   Resp: 16   Temp: 97.7 °F (36.5 °C)     Physical Exam  Vitals and nursing note reviewed.   Constitutional:       General: He is awake.      Appearance: Normal appearance. He is well-developed.   HENT:      Head: Normocephalic.      Mouth/Throat:      Lips: Pink.      Mouth: Mucous membranes are moist.   Eyes:      Pupils: Pupils are equal, round, and reactive to light.   Cardiovascular:      Rate and Rhythm: Normal rate and regular rhythm.      Pulses:           Dorsalis pedis pulses are 1+ on the left side.      Heart sounds: Normal heart sounds, S1 normal and S2 normal.   Pulmonary:      Effort: Pulmonary effort is normal. No respiratory distress.      Breath sounds: Normal breath sounds. No decreased breath sounds, wheezing,  "rhonchi or rales.   Abdominal:      General: Bowel sounds are normal.      Palpations: Abdomen is soft.   Musculoskeletal:         General: Normal range of motion.      Cervical back: Normal range of motion.      Right foot: Normal.      Left foot: Swelling and tenderness present.      Comments: Mild edema and erythema noted to left metatarsal head and left medial foot. Mild TTP. Full ROM   Feet:      Right foot:      Skin integrity: Skin integrity normal.      Left foot:      Skin integrity: Erythema present.      Comments: Mild edema and erythema noted to left metatarsal head and left medial foot. Mild TTP. Full ROM  Skin:     General: Skin is warm.      Capillary Refill: Capillary refill takes less than 2 seconds.   Neurological:      Mental Status: He is alert and oriented to person, place, and time.   Psychiatric:         Mood and Affect: Mood normal.         Behavior: Behavior normal. Behavior is cooperative.         Thought Content: Thought content does not include homicidal or suicidal ideation.               No results found for: "WBC", "HGB", "HCT", "MCV", "PLT"     CMP  No results found for: "NA", "K", "CL", "CO2", "GLU", "BUN", "CREATININE", "CALCIUM", "PROT", "ALBUMIN", "BILITOT", "ALKPHOS", "AST", "ALT", "ANIONGAP", "EGFRNORACEVR"  Procedures   Assessment and Plan (including Health Maintenance)   :    Plan:           Problem List Items Addressed This Visit          Orthopedic    Left foot pain - Primary    Current Assessment & Plan     Keep boot on foot. Rest, Ice, Tylenol/Ibuprofen as needed. Will call pt with xray results and any new orders with understanding voiced         Relevant Orders    X-Ray Foot Complete 3 view Left    Pain of left great toe    Current Assessment & Plan     Keep boot on foot. Rest, Ice, Tylenol/Ibuprofen as needed. Will call pt with xray results and any new orders with understanding voiced            Health Maintenance Topics with due status: Not Due       Topic Last " Completion Date    TETANUS VACCINE 07/31/2017    DTaP/Tdap/Td Vaccines 07/31/2017       No future appointments.     Health Maintenance Due   Topic Date Due    Meningococcal Vaccine (1 - 2-dose series) Never done    Influenza Vaccine (1) 09/01/2023        Follow up if symptoms worsen or fail to improve.     Signature:  [facility]  [address]  [address]    Date of encounter: 10/23/23

## 2023-11-09 ENCOUNTER — OFFICE VISIT (OUTPATIENT)
Dept: UROLOGY | Facility: CLINIC | Age: 63
End: 2023-11-09
Attending: UROLOGY
Payer: COMMERCIAL

## 2023-11-09 VITALS
SYSTOLIC BLOOD PRESSURE: 113 MMHG | BODY MASS INDEX: 28.89 KG/M2 | DIASTOLIC BLOOD PRESSURE: 73 MMHG | OXYGEN SATURATION: 96 % | WEIGHT: 201.81 LBS | HEIGHT: 70 IN | HEART RATE: 59 BPM

## 2023-11-09 DIAGNOSIS — N20.0 NEPHROLITHIASIS: Primary | ICD-10-CM

## 2023-11-09 DIAGNOSIS — R36.1 HEMATOSPERMIA: ICD-10-CM

## 2023-11-09 PROCEDURE — 3074F PR MOST RECENT SYSTOLIC BLOOD PRESSURE < 130 MM HG: ICD-10-PCS | Mod: CPTII,S$GLB,, | Performed by: UROLOGY

## 2023-11-09 PROCEDURE — 1160F PR REVIEW ALL MEDS BY PRESCRIBER/CLIN PHARMACIST DOCUMENTED: ICD-10-PCS | Mod: CPTII,S$GLB,, | Performed by: UROLOGY

## 2023-11-09 PROCEDURE — 3008F PR BODY MASS INDEX (BMI) DOCUMENTED: ICD-10-PCS | Mod: CPTII,S$GLB,, | Performed by: UROLOGY

## 2023-11-09 PROCEDURE — 1159F PR MEDICATION LIST DOCUMENTED IN MEDICAL RECORD: ICD-10-PCS | Mod: CPTII,S$GLB,, | Performed by: UROLOGY

## 2023-11-09 PROCEDURE — 1160F RVW MEDS BY RX/DR IN RCRD: CPT | Mod: CPTII,S$GLB,, | Performed by: UROLOGY

## 2023-11-09 PROCEDURE — 1159F MED LIST DOCD IN RCRD: CPT | Mod: CPTII,S$GLB,, | Performed by: UROLOGY

## 2023-11-09 PROCEDURE — 99214 OFFICE O/P EST MOD 30 MIN: CPT | Mod: S$GLB,,, | Performed by: UROLOGY

## 2023-11-09 PROCEDURE — 3008F BODY MASS INDEX DOCD: CPT | Mod: CPTII,S$GLB,, | Performed by: UROLOGY

## 2023-11-09 PROCEDURE — 3078F PR MOST RECENT DIASTOLIC BLOOD PRESSURE < 80 MM HG: ICD-10-PCS | Mod: CPTII,S$GLB,, | Performed by: UROLOGY

## 2023-11-09 PROCEDURE — 3078F DIAST BP <80 MM HG: CPT | Mod: CPTII,S$GLB,, | Performed by: UROLOGY

## 2023-11-09 PROCEDURE — 3074F SYST BP LT 130 MM HG: CPT | Mod: CPTII,S$GLB,, | Performed by: UROLOGY

## 2023-11-09 PROCEDURE — 82365 CALCULUS SPECTROSCOPY: CPT | Performed by: UROLOGY

## 2023-11-09 PROCEDURE — 99214 PR OFFICE/OUTPT VISIT, EST, LEVL IV, 30-39 MIN: ICD-10-PCS | Mod: S$GLB,,, | Performed by: UROLOGY

## 2023-11-09 NOTE — PROGRESS NOTES
"Subjective:      Guillermo Gee is a 62 y.o. male who returns today regarding his nephrolithiasis.    He passed kidney stones in October 2022. He passed 2 stones about 2-3 weeks after pain started. He has had prior stones - last several years ago. He hasn't had any episodes since then.     Previously seen for hematospermia, now resolved.    The following portions of the patient's history were reviewed and updated as appropriate: allergies, current medications, past family history, past medical history, past social history, past surgical history and problem list.    Review of Systems  A comprehensive multipoint review of systems was negative except as otherwise stated in the HPI.     Objective:   Vitals: /73 (BP Location: Right arm, Patient Position: Sitting, BP Method: Large (Automatic))   Pulse (!) 59   Ht 5' 10" (1.778 m)   Wt 91.6 kg (201 lb 13.3 oz)   SpO2 96%   BMI 28.96 kg/m²     Physical Exam   General: alert and oriented, no acute distress  Respiratory: Symmetric expansion, non-labored breathing  Neuro: no gross deficits  Psych: normal judgment and insight, normal mood/affect, and non-anxious    Lab Review   Urinalysis demonstrates negative for all components  Lab Results   Component Value Date    WBC 10.07 10/24/2021    HGB 14.4 10/24/2021    HCT 40 10/24/2021    HCT 43.1 10/24/2021    MCV 90 10/24/2021     10/24/2021     Lab Results   Component Value Date    CREATININE 1.3 10/24/2021    BUN 20 10/24/2021     Lab Results   Component Value Date    PSA 0.95 10/04/2019         Assessment and Plan:   1. Nephrolithiasis  -- Stone for analysis  -- Hydration, increased citrate, avoid Na    2. Hematospermia  -- Resolved     FU PRN  "

## 2023-11-14 LAB
COMPN STONE: NORMAL
LABORATORY COMMENT REPORT: NORMAL
SPECIMEN SOURCE: NORMAL
STONE ANALYSIS IR-IMP: NORMAL

## 2023-12-01 ENCOUNTER — PATIENT MESSAGE (OUTPATIENT)
Dept: CARDIOLOGY | Facility: CLINIC | Age: 63
End: 2023-12-01
Payer: COMMERCIAL

## 2025-01-08 ENCOUNTER — TELEPHONE (OUTPATIENT)
Dept: UROLOGY | Facility: CLINIC | Age: 65
End: 2025-01-08
Payer: COMMERCIAL

## 2025-01-13 ENCOUNTER — OFFICE VISIT (OUTPATIENT)
Dept: UROLOGY | Facility: CLINIC | Age: 65
End: 2025-01-13
Payer: COMMERCIAL

## 2025-01-13 ENCOUNTER — TELEPHONE (OUTPATIENT)
Dept: UROLOGY | Facility: CLINIC | Age: 65
End: 2025-01-13

## 2025-01-13 VITALS
HEART RATE: 69 BPM | DIASTOLIC BLOOD PRESSURE: 86 MMHG | SYSTOLIC BLOOD PRESSURE: 132 MMHG | HEIGHT: 70 IN | WEIGHT: 201.94 LBS | RESPIRATION RATE: 16 BRPM | BODY MASS INDEX: 28.91 KG/M2

## 2025-01-13 DIAGNOSIS — N20.0 NEPHROLITHIASIS: Primary | ICD-10-CM

## 2025-01-13 DIAGNOSIS — Z80.42 FAMILY HISTORY OF PROSTATE CANCER IN FATHER: ICD-10-CM

## 2025-01-13 DIAGNOSIS — N13.8 BPH WITH OBSTRUCTION/LOWER URINARY TRACT SYMPTOMS: ICD-10-CM

## 2025-01-13 DIAGNOSIS — N40.1 BPH WITH OBSTRUCTION/LOWER URINARY TRACT SYMPTOMS: ICD-10-CM

## 2025-01-13 DIAGNOSIS — N43.40 SPERMATOCELE OF EPIDIDYMIS: ICD-10-CM

## 2025-01-13 PROCEDURE — 1160F RVW MEDS BY RX/DR IN RCRD: CPT | Mod: CPTII,S$GLB,, | Performed by: NURSE PRACTITIONER

## 2025-01-13 PROCEDURE — 1159F MED LIST DOCD IN RCRD: CPT | Mod: CPTII,S$GLB,, | Performed by: NURSE PRACTITIONER

## 2025-01-13 PROCEDURE — 82365 CALCULUS SPECTROSCOPY: CPT | Performed by: NURSE PRACTITIONER

## 2025-01-13 PROCEDURE — 3075F SYST BP GE 130 - 139MM HG: CPT | Mod: CPTII,S$GLB,, | Performed by: NURSE PRACTITIONER

## 2025-01-13 PROCEDURE — 99214 OFFICE O/P EST MOD 30 MIN: CPT | Mod: S$GLB,,, | Performed by: NURSE PRACTITIONER

## 2025-01-13 PROCEDURE — 3079F DIAST BP 80-89 MM HG: CPT | Mod: CPTII,S$GLB,, | Performed by: NURSE PRACTITIONER

## 2025-01-13 PROCEDURE — 3008F BODY MASS INDEX DOCD: CPT | Mod: CPTII,S$GLB,, | Performed by: NURSE PRACTITIONER

## 2025-01-13 NOTE — PROGRESS NOTES
"Subjective:      Guillermo Gee is a 64 y.o. male who returns today regarding his nephrolithiasis.  Former patient of Dr. Shaver's and new to me today.    History of kidney stones that have passed spontaneously for many years. ESWL x 3 by Dr. Marc.  Stone analysis- "100% Calcium oxalate monohydrate." No recent renal imaging.     He reports passing a large stone about 2 weeks ago, which he was able to collect. Pain and bothersome urinary symptoms are resolved.     He reports slower urinary stream and nocturia 1x/night. Denies daytime frequency and urgency.  He drinks minimal water. ++ coffee.     Also with a history of R spermatocele- noted on US in 2022 (DIS). Reports that it is somewhat bothersome. Painless though.     + family history of prostate cancer (father)  PSA 1.6 (12/31)    The following portions of the patient's history were reviewed and updated as appropriate: allergies, current medications, past family history, past medical history, past social history, past surgical history and problem list.    Review of Systems  Constitutional: no fever or chills  ENT: no nasal congestion or sore throat  Respiratory: no cough or shortness of breath  Cardiovascular: no chest pain or palpitations  Gastrointestinal: no nausea or vomiting, tolerating diet  Genitourinary: as per HPI  Hematologic/Lymphatic: no easy bruising or lymphadenopathy  Musculoskeletal: no arthralgias or myalgias  Neurological: no seizures or tremors  Behavioral/Psych: no auditory or visual hallucinations     Objective:   Vitals:   Vitals:    01/13/25 1137   BP: 132/86   Pulse: 69   Resp: 16     Physical Exam   General: alert and oriented, no acute distress  Head: normocephalic, atraumatic  Neck: supple, normal ROM  Respiratory: Symmetric expansion, non-labored breathing  Cardiovascular: regular rate and rhythm  Abdomen: soft, non tender, non distended  Genitourinary:   Penis: normal, no lesions, patent orthotopic meatus, no plaques  Scrotum: " spermatocele: right, no rashes or skin changes;   Testes: descended bilaterally, no masses, nontender, normal epididymides bilaterally, no hydroceles  Prostate: pt declined STEVIE  Skin: normal coloration and turgor, no rashes, no suspicious skin lesions noted  Neuro: alert and oriented x3, no gross deficits  Psych: normal judgment and insight, normal mood/affect, and non-anxious    Lab Review   Urinalysis demonstrates : no specimen  Lab Results   Component Value Date    WBC 10.07 10/24/2021    HGB 14.4 10/24/2021    HCT 40 10/24/2021    HCT 43.1 10/24/2021    MCV 90 10/24/2021     10/24/2021     Lab Results   Component Value Date    CREATININE 1.05 12/31/2024    CREATININE 1.3 10/24/2021    BUN 16 12/31/2024    BUN 20 10/24/2021     Lab Results   Component Value Date    PSA 0.95 10/04/2019     Imaging   None    Assessment:     1. Nephrolithiasis    2. Spermatocele of epididymis    3. Family history of prostate cancer in father    4. BPH with obstruction/lower urinary tract symptoms      Plan:   Diagnoses and all orders for this visit:    Nephrolithiasis  -     Urinary Stone Analysis  -     X-Ray KUB; Future  -     US Retroperitoneal Complete; Future    Spermatocele of epididymis  -     US Scrotum And Testicles; Future    Family history of prostate cancer in father    BPH with obstruction/lower urinary tract symptoms    Plan:  --Stone sent for analysis  --KUB and TABATHA for stone surveillance  --Recommend general stone preventive measures, to include increased hydration, low Na diet, and increased citrus intake  --Discussed flomax for LUTS, pt declines for now   --Scrotal US   --PSA annually with PCP

## 2025-01-13 NOTE — TELEPHONE ENCOUNTER
Scheduled appt  ----- Message from Yolie Garcia NP sent at 1/13/2025 12:00 PM CST -----  Scrotal US, KUJACK and TABATHA please

## 2025-01-15 ENCOUNTER — PATIENT MESSAGE (OUTPATIENT)
Dept: UROLOGY | Facility: CLINIC | Age: 65
End: 2025-01-15
Payer: COMMERCIAL

## 2025-01-17 ENCOUNTER — HOSPITAL ENCOUNTER (OUTPATIENT)
Dept: RADIOLOGY | Facility: OTHER | Age: 65
Discharge: HOME | End: 2025-01-17
Attending: NURSE PRACTITIONER
Payer: COMMERCIAL

## 2025-01-17 ENCOUNTER — PATIENT MESSAGE (OUTPATIENT)
Dept: UROLOGY | Facility: CLINIC | Age: 65
End: 2025-01-17
Payer: COMMERCIAL

## 2025-01-17 DIAGNOSIS — N20.0 NEPHROLITHIASIS: ICD-10-CM

## 2025-01-17 DIAGNOSIS — N43.40 SPERMATOCELE OF EPIDIDYMIS: ICD-10-CM

## 2025-01-17 PROCEDURE — 74018 RADEX ABDOMEN 1 VIEW: CPT | Mod: TC,FY

## 2025-01-17 PROCEDURE — 76870 US EXAM SCROTUM: CPT | Mod: TC

## 2025-01-17 PROCEDURE — 74018 RADEX ABDOMEN 1 VIEW: CPT | Mod: 26,,, | Performed by: RADIOLOGY

## 2025-01-17 PROCEDURE — 76770 US EXAM ABDO BACK WALL COMP: CPT | Mod: 26,59,, | Performed by: RADIOLOGY

## 2025-01-17 PROCEDURE — 93975 VASCULAR STUDY: CPT | Mod: 26,,, | Performed by: RADIOLOGY

## 2025-01-17 PROCEDURE — 76870 US EXAM SCROTUM: CPT | Mod: 26,,, | Performed by: RADIOLOGY

## 2025-01-17 PROCEDURE — 76770 US EXAM ABDO BACK WALL COMP: CPT | Mod: TC

## 2025-03-08 ENCOUNTER — PATIENT MESSAGE (OUTPATIENT)
Dept: CARDIOLOGY | Facility: CLINIC | Age: 65
End: 2025-03-08
Payer: COMMERCIAL

## 2025-03-10 RX ORDER — ROSUVASTATIN CALCIUM 20 MG/1
20 TABLET, COATED ORAL DAILY
Qty: 90 TABLET | Refills: 3 | Status: SHIPPED | OUTPATIENT
Start: 2025-03-10 | End: 2026-03-10

## 2025-05-07 ENCOUNTER — TELEPHONE (OUTPATIENT)
Dept: CARDIOLOGY | Facility: CLINIC | Age: 65
End: 2025-05-07
Payer: COMMERCIAL

## 2025-05-09 ENCOUNTER — OFFICE VISIT (OUTPATIENT)
Dept: CARDIOLOGY | Facility: CLINIC | Age: 65
End: 2025-05-09
Payer: COMMERCIAL

## 2025-05-09 VITALS
HEIGHT: 70 IN | SYSTOLIC BLOOD PRESSURE: 106 MMHG | WEIGHT: 196 LBS | HEART RATE: 62 BPM | OXYGEN SATURATION: 99 % | DIASTOLIC BLOOD PRESSURE: 73 MMHG | BODY MASS INDEX: 28.06 KG/M2

## 2025-05-09 DIAGNOSIS — Q23.81 BICUSPID AORTIC VALVE: Primary | ICD-10-CM

## 2025-05-09 DIAGNOSIS — Z98.890 STATUS POST THORACIC AORTIC ANEURYSM REPAIR: ICD-10-CM

## 2025-05-09 DIAGNOSIS — E78.00 HYPERCHOLESTEROLEMIA: ICD-10-CM

## 2025-05-09 DIAGNOSIS — R73.03 PRE-DIABETES: ICD-10-CM

## 2025-05-09 DIAGNOSIS — Z86.79 STATUS POST THORACIC AORTIC ANEURYSM REPAIR: ICD-10-CM

## 2025-05-09 DIAGNOSIS — I25.10 CORONARY ARTERY DISEASE INVOLVING NATIVE CORONARY ARTERY OF NATIVE HEART WITHOUT ANGINA PECTORIS: ICD-10-CM

## 2025-05-09 PROCEDURE — 99999 PR PBB SHADOW E&M-EST. PATIENT-LVL IV: CPT | Mod: PBBFAC,,, | Performed by: INTERNAL MEDICINE

## 2025-05-09 RX ORDER — MAGNESIUM GLUCONATE 27.5 (500)
TABLET ORAL ONCE
COMMUNITY

## 2025-05-09 NOTE — PROGRESS NOTES
"Subjective:   Patient ID:  Guillermo Gee is a 64 y.o. male who presents for follow up of Follow-up      HPI: Routine f/u 5 years post ascending aortic aneurysm repair (Jan 2020).  He has a bicuspid aortic valve (type 0) with no significant insufficiency or stenosis ever noted.    He is doing well with no new symptoms or cardiovascular complaints and no change in exercise capacity.  He denies chest discomfort, MONTEIRO, palpitations, PND/orthopnea, lightheadedness and syncope.    He's increased his statin back to 20mg because dose adjustments have shown not to change his tendency toward muscle cramping.    He had a brief episode of his left eye getting suddenly very blurry.  It lasted < 10 seconds and recovered fully.  He had minimal plaque in his right ICA only in 2020.      2023 HPI: Routine f/u.  He's having consistent nighttime muscle cramping in his legs that he never had before he started Crestor.     He is doing well with no new symptoms or cardiovascular complaints and no change in exercise capacity.  He denies chest discomfort, MONTEIRO, palpitations, PND/orthopnea, lightheadedness and syncope.     He's walking 3 miles per day almost every day.       My 2020 HPI: Very pleasant man with a history of a bicuspid aortic valve with an ascending aortic aneurysm repaired in January of this year.  He recently had an episode characterized by loss of vision in his left eye for about 5 minutes, and subsequent retinal scanning showed infarction.     He occasionally smokes cigarettes.     Angiography prior to his aorta repair showed mild to moderate nonobstructive atherosclerotic disease.      Problem List[1]    Current Outpatient Medications   Medication Sig    ascorbic acid, vitamin C, (VITAMIN C) 500 MG tablet Take 500 mg by mouth once daily.     b complex vitamins tablet Take 1 tablet by mouth once daily.    BABY ASPIRIN ORAL Take 162 mg by mouth once daily. "Take two baby aspirins daily"    coenzyme Q10 100 mg capsule Take " 100 mg by mouth once daily.    magnesium gluconate 27.5 mg magne- sium (500 mg) Tab Take by mouth once.    omega-3 fatty acids/fish oil (FISH OIL-OMEGA-3 FATTY ACIDS) 300-1,000 mg capsule Take 2 capsules by mouth 2 (two) times a day.     rosuvastatin (CRESTOR) 20 MG tablet Take 1 tablet (20 mg total) by mouth once daily. Patient needs to schedule an appointment in the consult cardiology clinic.    saw palmetto 500 MG capsule Take 500 mg by mouth once daily.    turmeric 400 mg Cap Take 1 capsule by mouth once daily. 1500mg    vitamin E 100 UNIT capsule Take 100 Units by mouth once daily. 180mg daily    zinc sulfate (ZINCATE) 50 mg zinc (220 mg) capsule Take 220 mg by mouth once daily.     betamethasone dipropionate (DIPROLENE) 0.05 % cream Apply topically as needed.     biotin/calcium carbonate (BIOTIN 100+10 ORAL) Take 1 tablet by mouth once daily.     selenium 200 mcg Tab 100mcg     No current facility-administered medications for this visit.       ROS  The review of systems is negative except as above.     Objective:   Physical Exam  Vitals reviewed.   Constitutional:       Appearance: He is well-developed.   HENT:      Head: Normocephalic and atraumatic.   Eyes:      General: No scleral icterus.     Conjunctiva/sclera: Conjunctivae normal.   Neck:      Vascular: No JVD.   Cardiovascular:      Rate and Rhythm: Normal rate and regular rhythm.      Pulses: Intact distal pulses.      Heart sounds: Normal heart sounds. No murmur heard.     No friction rub. No gallop.   Pulmonary:      Effort: Pulmonary effort is normal.      Breath sounds: Normal breath sounds. No wheezing or rales.   Abdominal:      General: Bowel sounds are normal. There is no distension.      Palpations: Abdomen is soft.      Tenderness: There is no abdominal tenderness.   Musculoskeletal:         General: Normal range of motion.      Cervical back: Normal range of motion and neck supple.   Skin:     General: Skin is warm and dry.      Findings: No  erythema or rash.   Neurological:      Mental Status: He is alert and oriented to person, place, and time.   Psychiatric:         Behavior: Behavior normal.         Thought Content: Thought content normal.         Judgment: Judgment normal.     Lab Results   Component Value Date    WBC 10.07 10/24/2021    HGB 14.4 10/24/2021    HCT 40 10/24/2021    MCV 90 10/24/2021     10/24/2021         Chemistry        Component Value Date/Time     12/31/2024 0732     10/24/2021 2046    K 4.1 12/31/2024 0732    K 5.6 (H) 10/24/2021 2046     10/24/2021 2046    CO2 28 12/31/2024 0732    CO2 16 (L) 10/24/2021 2046    BUN 16 12/31/2024 0732    BUN 20.0 05/20/2024 0916    BUN 20 10/24/2021 2046    CREATININE 1.05 12/31/2024 0732    CREATININE 1.3 10/24/2021 2046     (H) 12/31/2024 0732     (H) 10/24/2021 2046        Component Value Date/Time    CALCIUM 9.0 12/31/2024 0732    CALCIUM 8.7 10/24/2021 2046    ALKPHOS 77 12/31/2024 0732    ALKPHOS 56 10/24/2021 2046    AST 18 12/31/2024 0732    AST 48 (H) 10/24/2021 2046    ALT 13 12/31/2024 0732    ALT 27 10/24/2021 2046    BILITOT 0.3 12/31/2024 0732    BILITOT 0.4 10/24/2021 2046    ESTGFRAFRICA >60 10/24/2021 2046    EGFRNONAA 60 10/24/2021 2046            Lab Results   Component Value Date    CHOL 143 03/16/2021    CHOL 292 (H) 12/09/2020    CHOL 241 (H) 02/10/2020     Lab Results   Component Value Date    HDL 51 03/16/2021    HDL 46 12/09/2020    HDL 52 02/10/2020     Lab Results   Component Value Date    LDLCALC 72.4 03/16/2021    LDLCALC 176.4 (H) 12/09/2020    LDLCALC 169.0 (H) 02/10/2020     Lab Results   Component Value Date    TRIG 98 03/16/2021    TRIG 348 (H) 12/09/2020    TRIG 100 02/10/2020     Lab Results   Component Value Date    CHOLHDL 35.7 03/16/2021    CHOLHDL 15.8 (L) 12/09/2020    CHOLHDL 21.6 02/10/2020       Lab Results   Component Value Date    TSH 2.67 05/20/2024       Lab Results   Component Value Date    HGBA1C 5.8 (H)  12/31/2024       Last LDL ([facility], 12/2024): 71    Assessment:     1. Bicuspid aortic valve    2. Status post thoracic aortic aneurysm repair    3. Coronary artery disease involving native coronary artery of native heart without angina pectoris    4. Hypercholesterolemia    5. Pre-diabetes        Plan:     Continue current medicines.    Echo    Diet/exercise goals reinforced.    F/U 12 months                 [1]   Patient Active Problem List  Diagnosis    Diverticulosis    Family history of prostate cancer in father    Kidney stones    Hypercholesterolemia    [place] cardiac risk 10-20% in next 10 years    Status post thoracic aortic aneurysm repair    Bicuspid aortic valve    CAD (coronary artery disease)    Pre-diabetes    Embolism involving retinal artery    Amaurosis fugax    Thyroid nodule

## 2025-05-23 ENCOUNTER — HOSPITAL ENCOUNTER (OUTPATIENT)
Dept: CARDIOLOGY | Facility: HOSPITAL | Age: 65
Discharge: HOME | End: 2025-05-23
Attending: INTERNAL MEDICINE
Payer: COMMERCIAL

## 2025-05-23 ENCOUNTER — PATIENT MESSAGE (OUTPATIENT)
Dept: CARDIOLOGY | Facility: CLINIC | Age: 65
End: 2025-05-23
Payer: COMMERCIAL

## 2025-05-23 VITALS
HEIGHT: 70 IN | BODY MASS INDEX: 27.92 KG/M2 | DIASTOLIC BLOOD PRESSURE: 73 MMHG | WEIGHT: 195 LBS | SYSTOLIC BLOOD PRESSURE: 106 MMHG | HEART RATE: 55 BPM

## 2025-05-23 DIAGNOSIS — Z86.79 STATUS POST THORACIC AORTIC ANEURYSM REPAIR: ICD-10-CM

## 2025-05-23 DIAGNOSIS — Q23.81 BICUSPID AORTIC VALVE: ICD-10-CM

## 2025-05-23 DIAGNOSIS — Z98.890 STATUS POST THORACIC AORTIC ANEURYSM REPAIR: ICD-10-CM

## 2025-05-23 LAB
AORTIC SIZE INDEX (SOV): 2 CM/M2
AORTIC SIZE INDEX: 1.6 CM/M2
ASCENDING AORTA: 3.3 CM
AV AREA BY CONTINUOUS VTI: 3 CM2
AV INDEX (PROSTH): 0.53
AV LVOT MEAN GRADIENT: 2 MMHG
AV LVOT PEAK GRADIENT: 3 MMHG
AV MEAN GRADIENT: 6 MMHG
AV PEAK GRADIENT: 12 MMHG
AV VALVE AREA BY VELOCITY RATIO: 3 CM²
AV VALVE AREA: 3 CM2
AV VELOCITY RATIO: 0.53
BSA FOR ECHO PROCEDURE: 2.09 M2
CV ECHO LV RWT: 0.37 CM
DOP CALC AO PEAK VEL: 1.7 M/S
DOP CALC AO VTI: 40.1 CM
DOP CALC LVOT AREA: 5.7 CM2
DOP CALC LVOT DIAMETER: 2.7 CM
DOP CALC LVOT PEAK VEL: 0.9 M/S
DOP CALC LVOT STROKE VOLUME: 120.7 CM3
DOP CALCLVOT PEAK VEL VTI: 21.1 CM
E WAVE DECELERATION TIME: 209 MS
E/A RATIO: 0.73
E/E' RATIO: 5 M/S
ECHO EF ESTIMATED: 51 %
ECHO LV POSTERIOR WALL: 0.9 CM (ref 0.6–1.1)
FRACTIONAL SHORTENING: 26.5 % (ref 28–44)
INTERVENTRICULAR SEPTUM: 0.8 CM (ref 0.6–1.1)
IVC DIAMETER: 1.65 CM
IVRT: 95 MS
LA MAJOR: 5.3 CM
LA WIDTH: 3.7 CM
LEFT ATRIUM SIZE: 3.4 CM
LEFT INTERNAL DIMENSION IN SYSTOLE: 3.6 CM (ref 2.1–4)
LEFT VENTRICLE DIASTOLIC VOLUME INDEX: 53.88 ML/M2
LEFT VENTRICLE DIASTOLIC VOLUME: 111 ML
LEFT VENTRICLE MASS INDEX: 68.9 G/M2
LEFT VENTRICLE SYSTOLIC VOLUME INDEX: 26.2 ML/M2
LEFT VENTRICLE SYSTOLIC VOLUME: 54 ML
LEFT VENTRICULAR INTERNAL DIMENSION IN DIASTOLE: 4.9 CM (ref 3.5–6)
LEFT VENTRICULAR MASS: 141.9 G
LV LATERAL E/E' RATIO: 4.5
LV SEPTAL E/E' RATIO: 6.1
MV A" WAVE DURATION": 188.39 MS
MV PEAK A VEL: 0.67 M/S
MV PEAK E VEL: 0.49 M/S
OHS CV RV/LV RATIO: 0.84 CM
PISA TR MAX VEL: 2.1 M/S
PULM VEIN A" WAVE DURATION": 188.39 MS
PULM VEIN S/D RATIO: 1.15
PULMONIC VEIN PEAK A VELOCITY: 0.2 M/S
PV PEAK D VEL: 0.4 M/S
PV PEAK S VEL: 0.46 M/S
RA MAJOR: 5.54 CM
RA PRESSURE ESTIMATED: 3 MMHG
RA WIDTH: 4.59 CM
RIGHT ATRIAL AREA: 22.9 CM2
RIGHT VENTRICLE DIASTOLIC BASEL DIMENSION: 4.1 CM
RV TB RVSP: 5 MMHG
RV TISSUE DOPPLER FREE WALL SYSTOLIC VELOCITY 1 (APICAL 4 CHAMBER VIEW): 12.68 CM/S
SINUS: 4.03 CM
STJ: 3.1 CM
TDI LATERAL: 0.11 M/S
TDI SEPTAL: 0.08 M/S
TDI: 0.1 M/S
TRICUSPID ANNULAR PLANE SYSTOLIC EXCURSION: 2 CM
TV PEAK GRADIENT: 18 MMHG
TV REST PULMONARY ARTERY PRESSURE: 21 MMHG
Z-SCORE OF LEFT VENTRICULAR DIMENSION IN END DIASTOLE: -2.39
Z-SCORE OF LEFT VENTRICULAR DIMENSION IN END SYSTOLE: -0.44

## 2025-05-23 PROCEDURE — 93306 TTE W/DOPPLER COMPLETE: CPT

## 2025-05-23 PROCEDURE — 93306 TTE W/DOPPLER COMPLETE: CPT | Mod: 26,,, | Performed by: INTERNAL MEDICINE

## 2025-07-18 ENCOUNTER — PATIENT MESSAGE (OUTPATIENT)
Dept: CARDIOLOGY | Facility: CLINIC | Age: 65
End: 2025-07-18
Payer: COMMERCIAL

## 2025-07-25 ENCOUNTER — PATIENT MESSAGE (OUTPATIENT)
Dept: CARDIOLOGY | Facility: CLINIC | Age: 65
End: 2025-07-25
Payer: COMMERCIAL

## 2025-08-01 RX ORDER — ROSUVASTATIN CALCIUM 20 MG/1
20 TABLET, COATED ORAL DAILY
Qty: 90 TABLET | Refills: 3 | Status: SHIPPED | OUTPATIENT
Start: 2025-08-01 | End: 2026-08-01

## (undated) DEVICE — ELECTRODE PAD DEFIB STERILE

## (undated) DEVICE — DRAIN CHANNEL ROUND 19FR

## (undated) DEVICE — SUT 2/0 36IN COATED VICRYL

## (undated) DEVICE — SUT SILK 2-0 SH 18IN BLACK

## (undated) DEVICE — INSERTS STEALTH FIBRA SIZE 5

## (undated) DEVICE — KIT SAHARA DRAPE DRAW/LIFT

## (undated) DEVICE — DRAPE SLUSH WARMER WITH DISC

## (undated) DEVICE — OMNIPAQUE 350 200ML

## (undated) DEVICE — SEE MEDLINE ITEM 152487

## (undated) DEVICE — TRAY HEART

## (undated) DEVICE — SOL 9P NACL IRR PIC IL

## (undated) DEVICE — SUT VICRYL BR 1 GEN 27 CT-1

## (undated) DEVICE — WIRE INTRAMYOCARDIAL TEMP

## (undated) DEVICE — CATH IMPULSE FL4 5FR 100CM

## (undated) DEVICE — WIRE GUIDE SAFE-T-J .035 260CM

## (undated) DEVICE — Device

## (undated) DEVICE — ELECTRODE REM PLYHSV RETURN 9

## (undated) DEVICE — RETRACTOR OCTOBASE INSERT HOLD

## (undated) DEVICE — WIPE ESENTA BARR STNG FREE 3ML

## (undated) DEVICE — SUT PROLENE 4-0 RB-1 BL MO

## (undated) DEVICE — GAUZE SPONGE 4X4 12PLY

## (undated) DEVICE — SUT PROLENE 4-0 SH BLU 36IN

## (undated) DEVICE — DRAPE SPLIT STERILE

## (undated) DEVICE — CANNULA AORTIC ROOT 12 GAUGE 9

## (undated) DEVICE — APPLICATOR CHLORAPREP ORN 26ML

## (undated) DEVICE — DRESSING AQUACEL SACRAL 9 X 9

## (undated) DEVICE — SPONGE GAUZE 16PLY 4X4

## (undated) DEVICE — KIT URINARY CATH URINE METER

## (undated) DEVICE — SUT 2/0 30IN ETHIBOND

## (undated) DEVICE — SUT SILK BLK BR. 2 2-60

## (undated) DEVICE — CATH JACKY RADIAL 5FR 100CM

## (undated) DEVICE — BLADE SAW STERNAL 5/BX

## (undated) DEVICE — GLOVE BIOGEL PI MICRO SZ 7.5

## (undated) DEVICE — HEMOSTAT VASC BAND REG 24CM

## (undated) DEVICE — CANNULA 29/29FR VACUUM ASSIST

## (undated) DEVICE — SUT PROLENE 5-0 BL C-1 4-24

## (undated) DEVICE — COVER SET UP STRL 54X54 20/BX

## (undated) DEVICE — SET DECANTER MEDICHOICE

## (undated) DEVICE — CLOSURE SKIN STERI STRIP 1/2X4

## (undated) DEVICE — SUT 6 18IN STEEL MONO CCS

## (undated) DEVICE — DRAIN INTRACARDIAC SUMP 1/8IN

## (undated) DEVICE — SIZER GRAFT VASCULAR 24-38MM

## (undated) DEVICE — SUT 3-0 CTD VICRYL 27IN PS

## (undated) DEVICE — SPIKE CONTRAST CONTROLLER

## (undated) DEVICE — FOGERTY SOFT JAW DISP 2/PK

## (undated) DEVICE — DRESSING ADH ISLAND 3.6 X 14

## (undated) DEVICE — KIT GLIDESHEATH SLEND 6FR 10CM

## (undated) DEVICE — PROTECTION STATION PLUS

## (undated) DEVICE — SEE MEDLINE ITEM 156894

## (undated) DEVICE — SUT ETHIBOND EXCEL 2-0 SH-2

## (undated) DEVICE — SOL NS 1000CC

## (undated) DEVICE — KIT CUSTOM MANIFOLD

## (undated) DEVICE — DRAIN CHEST DRY SUCTION

## (undated) DEVICE — CONTAINER SPECIMEN STRL 4OZ